# Patient Record
Sex: FEMALE | Race: BLACK OR AFRICAN AMERICAN | NOT HISPANIC OR LATINO
[De-identification: names, ages, dates, MRNs, and addresses within clinical notes are randomized per-mention and may not be internally consistent; named-entity substitution may affect disease eponyms.]

---

## 2017-01-30 ENCOUNTER — APPOINTMENT (OUTPATIENT)
Dept: NEUROLOGY | Facility: CLINIC | Age: 62
End: 2017-01-30

## 2017-01-30 VITALS
DIASTOLIC BLOOD PRESSURE: 89 MMHG | BODY MASS INDEX: 28.82 KG/M2 | HEART RATE: 100 BPM | SYSTOLIC BLOOD PRESSURE: 141 MMHG | HEIGHT: 65 IN | WEIGHT: 173 LBS

## 2017-01-30 DIAGNOSIS — M06.9 RHEUMATOID ARTHRITIS, UNSPECIFIED: ICD-10-CM

## 2017-01-30 DIAGNOSIS — R20.0 ANESTHESIA OF SKIN: ICD-10-CM

## 2017-01-30 DIAGNOSIS — R52 PAIN, UNSPECIFIED: ICD-10-CM

## 2017-01-30 DIAGNOSIS — Z86.79 PERSONAL HISTORY OF OTHER DISEASES OF THE CIRCULATORY SYSTEM: ICD-10-CM

## 2017-01-30 DIAGNOSIS — Z87.898 PERSONAL HISTORY OF OTHER SPECIFIED CONDITIONS: ICD-10-CM

## 2017-01-30 DIAGNOSIS — Z87.19 PERSONAL HISTORY OF OTHER DISEASES OF THE DIGESTIVE SYSTEM: ICD-10-CM

## 2017-01-30 DIAGNOSIS — Z86.69 PERSONAL HISTORY OF OTHER DISEASES OF THE NERVOUS SYSTEM AND SENSE ORGANS: ICD-10-CM

## 2017-02-01 PROBLEM — Z87.898 HISTORY OF EPISTAXIS: Status: RESOLVED | Noted: 2017-02-01 | Resolved: 2017-02-01

## 2017-02-01 PROBLEM — R52 BURNING PAIN: Status: ACTIVE | Noted: 2017-02-01

## 2017-02-01 PROBLEM — Z86.69 HISTORY OF GLAUCOMA: Status: RESOLVED | Noted: 2017-02-01 | Resolved: 2017-02-01

## 2017-02-01 PROBLEM — R20.0 NUMBNESS OF FINGERS OF BOTH HANDS: Status: ACTIVE | Noted: 2017-02-01

## 2017-02-01 PROBLEM — M06.9 RHEUMATOID ARTHRITIS: Status: ACTIVE | Noted: 2017-02-01

## 2017-02-01 PROBLEM — Z86.79 HISTORY OF HYPERTENSION: Status: RESOLVED | Noted: 2017-02-01 | Resolved: 2017-02-01

## 2017-02-01 RX ORDER — PREDNISONE 5 MG/1
5 TABLET ORAL
Refills: 0 | Status: ACTIVE | COMMUNITY

## 2017-02-01 RX ORDER — ADALIMUMAB 40MG/0.8ML
40 KIT SUBCUTANEOUS
Refills: 0 | Status: ACTIVE | COMMUNITY

## 2017-02-01 RX ORDER — METHOTREXATE 2.5 MG/1
TABLET ORAL
Refills: 0 | Status: ACTIVE | COMMUNITY

## 2017-02-14 ENCOUNTER — APPOINTMENT (OUTPATIENT)
Dept: NEUROLOGY | Facility: CLINIC | Age: 62
End: 2017-02-14

## 2017-02-14 DIAGNOSIS — G56.03 CARPAL TUNNEL SYNDROM,BILATERAL UPPER LIMBS: ICD-10-CM

## 2019-04-16 ENCOUNTER — RESULT REVIEW (OUTPATIENT)
Age: 64
End: 2019-04-16

## 2019-06-07 ENCOUNTER — APPOINTMENT (OUTPATIENT)
Dept: NEUROLOGY | Facility: CLINIC | Age: 64
End: 2019-06-07

## 2020-05-28 ENCOUNTER — VIRTUAL VISIT (OUTPATIENT)
Dept: FAMILY MEDICINE CLINIC | Facility: CLINIC | Age: 65
End: 2020-05-28

## 2020-05-28 NOTE — PROGRESS NOTES
Consent: Hailee Gaviria, who was seen by synchronous (real-time) audio-video technology, and/or her healthcare decision maker, is aware that this patient-initiated, Telehealth encounter on 2020 is a billable service, with coverage as determined by her insurance carrier. She is aware that she may receive a bill and has provided verbal consent to proceed: Yes. The patient was at home and I was at the office,and no one else participated in the service. The patient presents with {DIFF DX:81015} The patient presents with {DIFF DX:99352} {vaavitamincdeaids (Optional):77537} There are no diagnoses linked to this encounter. ASSESSMENT and PLAN 
{ASSESSMENT/PLAN:60128} There are no preventive care reminders to display for this patient. {Time-based coding - will disappear if no selections made (Optional):89472::\"I spent at least 15 minutes with this established patient, and >50% of the time was spent counseling and/or coordinating care regarding ***\":0} 
712 Subjective:  
Hailee Gaviria is a 72 y.o. female who was seen for establishment of care {Location on body:90602},{pain nature:669482:p} {Severity:5014:s}{gen duration:593041}{neuro timin}{Has/no assoc sx's:10398}{Dyspnea context:58975}{causes; problem cardiology context:60375}{PED UROLOGY MODIFYING GPMQUBQ:06975093} Associated signs are *** Associated symptoms include ***. No current outpatient medications on file. No current facility-administered medications for this visit. Allergies not on file 
does not have a problem list on file. No past surgical history on file. Relationships Social connections  Talks on phone: Not on file  Gets together: Not on file  Attends Qu[employer] service: Not on file  Active member of club or organization: Not on file  Attends meetings of clubs or organizations: Not on file  Relationship status: Not on file  
 
family history is not on file. ROS Objective: There were no vitals taken for this visit. General: alert, cooperative, no distress Mental  status: normal mood, behavior, speech, dress, motor activity, and thought processes, able to follow commands HENT: NCAT. Pupils are equal.  Eyelids normal.  No obvious oral lesions are noted. *** Neck: no visualized mass Musculoskeletal: Able to move the *** Resp: no respiratory distress. Adequate excursion. Neuro: no gross deficits ***. Skin:  ***. No discoloration or lesions of concern on visible areas. Psychiatric: normal affect, consistent with stated mood, no evidence of hallucinations. No results found for this or any previous visit. No results found for any visits on 05/28/20. We discussed the expected course, resolution and complications of the diagnosis(es) in detail. Medication risks, benefits, costs, interactions, and alternatives were discussed as indicated. I advised her to contact the office if her condition worsens, changes or fails to improve as anticipated. She expressed understanding with the diagnosis(es) and plan. Uche Farnsworth is a 72 y.o. female being evaluated by a video visit encounter for concerns as above. A caregiver was present when appropriate. Due to this being a TeleHealth encounter (During JIPVD-28 public health emergency), evaluation of the following organ systems was limited: Vitals/Constitutional/EENT/Resp/CV/GI//MS/Neuro/Skin/Heme-Lymph-Imm. Pursuant to the emergency declaration under the [facility], 1135 waiver authority and the [employer] Act, this Virtual  Visit was conducted, with patient's (and/or legal guardian's) consent, to reduce the patient's risk of exposure to COVID-19 and provide necessary medical care. Maria C Dubon MD 
 
This note was done with the assistance of dragon speech software.   Some inadvertent errors or omissions may be presentThis encounter was created in error - please disregard.

## 2020-05-28 NOTE — PROGRESS NOTES
Melissa Sesay is a 72 y.o. female evaluated via audio only technology on 6/1/2020. Consent: She and/or her health care decision maker is aware that she may receive a bill for this audio only encounter, depending on her insurance coverage, and has provided verbal consent to proceed: Yes    I communicated with the patient and/or health care decision maker about the nature and details of the following:  Assessment & Plan:   Diagnoses and all orders for this visit:    5. Essential hypertension    6. Rheumatoid arthritis involving multiple sites, unspecified rheumatoid factor presence ([facility] [address]  -     REFERRAL TO RHEUMATOLOGY      No other encounters are seen here. The complexity of medical decision making for this visit is moderate         12  Subjective:   Melissa Sesay is a 72 y.o. female who was seen for  [facility] care. She recently moved here from [place] not working. She was switched to the following visit. She has RA as she states it primarily affects her wrists. Her fingers are also affected. She has carried this diagnosis since 2011  She was formerly on Humira. She was also taking methotrexate and folic acid. The last time that she took these medications was February 2020   She has a Rheumatologist now in our area and will most likely start back up on the medication. Has hada bone density test done here  She has pan x rays of her both hands, also. She has had carpal tunnel surgery bilaterally. Hypertension  Amlodipine 5  Lisinopril/hct  20/12.5  The patient has had no problem with the medication. The patient has no headaches, visual changes, chest pain or pressure,dyspnea, orthopnea, abdominal pain, dysuria, weakness, or paresthesias. BP Readings from Last 3 Encounters:   No data found for BP       Glaucoma  The patient states that this is a chronic problem no eye pain is admitted to. She will find a new ophthalmologist in our area.   Her medications are the following;  Dorzolamide-timolol 22.3-6.8 mg  Cosopt}Igg ou bid  Latanoprost.. 005%Igtt hs  Eye pressures 19/20  Tobacco use  Stopped smoking in 2003. Started age 25. Less than a pack a day when she did smoke. Alcohol use  The patient does drink alcohol, primarily wine. Sometimes one and occasionally 2 drinks a day  She may drink 5/7 days of the week. Right hip pain    Hypercholesterolemia  Need copy of lipid test from [place] when she will get for us. The patient denies muscle aches, headache, GI symptoms or difficulty with mentation release of information form will be signed. HM due  Due for colonoscopy  Last colonoscopy was at age 46. Mammogram   Last May 2019. Prior to Admission medications    Not on File     Not on File    There are no active problems to display for this patient. Current Outpatient Medications   Medication Sig Dispense Refill    varicella-zoster recombinant, PF, (Shingrix, PF,) 50 mcg/0.5 mL susr injection 0.5mL by IntraMUSCular route once now and then repeat in 2-6 months 0.5 mL 1     Not on File  No past medical history on file. Review of Systems   Constitutional: Negative for chills, fever and malaise/fatigue. HENT: Negative for congestion and sore throat. Eyes: Negative for blurred vision and redness. Respiratory: Negative for cough, shortness of breath and wheezing. Cardiovascular: Negative for chest pain and leg swelling. Gastrointestinal: Negative for abdominal pain, blood in stool, constipation, diarrhea and heartburn. Genitourinary: Negative for dysuria and urgency. Musculoskeletal: Positive for joint pain (She has pain in her wrists fingers and right hip) and myalgias. Neurological: Negative for dizziness, sensory change, speech change and focal weakness. Endo/Heme/Allergies: Does not bruise/bleed easily. Psychiatric/Behavioral: The patient is not nervous/anxious.         I affirm this is a Patient-Initiated Episode with a Patient who has not had a related appointment within my department in the past 7 days or scheduled within the next 24 hours.     Total Time: minutes: 11-20 minutes    Note: not billable if this call serves to triage the patient into an appointment for the relevant concern      Maria C Dubon MD

## 2020-06-01 ENCOUNTER — DOCUMENTATION ONLY (OUTPATIENT)
Dept: FAMILY MEDICINE CLINIC | Facility: CLINIC | Age: 65
End: 2020-06-01

## 2020-06-01 ENCOUNTER — VIRTUAL VISIT (OUTPATIENT)
Dept: FAMILY MEDICINE CLINIC | Facility: CLINIC | Age: 65
End: 2020-06-01

## 2020-06-01 DIAGNOSIS — Z12.31 ENCOUNTER FOR SCREENING MAMMOGRAM FOR MALIGNANT NEOPLASM OF BREAST: ICD-10-CM

## 2020-06-01 DIAGNOSIS — Z12.11 SCREEN FOR COLON CANCER: ICD-10-CM

## 2020-06-01 DIAGNOSIS — Z00.00 MEDICARE ANNUAL WELLNESS VISIT, INITIAL: Primary | ICD-10-CM

## 2020-06-01 DIAGNOSIS — I10 ESSENTIAL HYPERTENSION: ICD-10-CM

## 2020-06-01 DIAGNOSIS — M06.9 RHEUMATOID ARTHRITIS INVOLVING MULTIPLE SITES, UNSPECIFIED RHEUMATOID FACTOR PRESENCE: ICD-10-CM

## 2020-06-01 DIAGNOSIS — Z13.39 SCREENING FOR ALCOHOLISM: ICD-10-CM

## 2020-06-01 RX ORDER — ZOSTER VACCINE RECOMBINANT, ADJUVANTED 50 MCG/0.5
KIT INTRAMUSCULAR
Qty: 0.5 ML | Refills: 1 | Status: SHIPPED | OUTPATIENT
Start: 2020-06-01 | End: 2020-10-13 | Stop reason: SDUPTHER

## 2020-06-01 NOTE — PROGRESS NOTES
This is an Initial Medicare Annual Wellness Exam (AWV) (Performed 12 months after IPPE or effective date of Medicare Part B enrollment, Once in a lifetime). Consent: Aureliano Odell, who was seen by synchronous (real-time) audio only technology, and/or her healthcare decision maker, is aware that this patient-initiated, Telehealth encounter on 6/1/2020 is a billable service. While AWVs are fully covered by Medicare, any services rendered on this date that are not included in an AWV are subject to additional billing, with coverage as determined by her insurance carrier. She is aware that she may receive a bill for any such additional services and has provided verbal consent to proceed: Yes. I have reviewed the patient's medical history in detail and updated the computerized patient record. History   There is no problem list on file for this patient. No past medical history on file. No past surgical history on file. Not on File    No family history on file. Social History     Tobacco Use    Smoking status: Not on file   Substance Use Topics    Alcohol use: Not on file       Depression Risk Factor Screening:     3 most recent PHQ Screens 6/1/2020   Little interest or pleasure in doing things Not at all   Feeling down, depressed, irritable, or hopeless Not at all   Total Score PHQ 2 0       Alcohol Risk Factor Screening:   Do you average 1 drink per night or more than 7 drinks a week:  No  The patient drinks wine on occasion. Up to 5 drinks a week. She has never had more than 2 drinks at one time containing alcohol. On any one occasion in the past three months have you have had more than 3 drinks containing alcohol:  No      Functional Ability and Level of Safety:   Hearing: Hearing is good. Activities of Daily Living: The home contains: no safety equipment.   Patient does total self care     Ambulation: with no difficulty    Fall Risk:  Fall Risk Assessment, last 12 mths 6/1/2020   Able to walk? Yes   Fall in past 12 months? No       Abuse Screen:  Patient is not abused  She stays with and takes care of her mother. The family recently moved here from [place]. She is a retired . Cognitive Screening   Has your family/caregiver stated any concerns about your memory: no  Cognitive Screening: Normal - Verbal Fluency Test    Patient Care Team   Patient Care Team:  Keely Hatfield MD as PCP - General (Internal Medicine)    Assessment/Plan   Education and counseling provided:  Are appropriate based on today's review and evaluation  Pneumococcal Vaccine    Diagnoses and all orders for this visit:    1. Medicare annual wellness visit, initial  -     HEPATITIS C AB; Future  -     varicella-zoster recombinant, PF, (Shingrix, PF,) 50 mcg/0.5 mL susr injection; 0.5mL by IntraMUSCular route once now and then repeat in 2-6 months  -     pneumococcal 23-yomaira ps vaccine (PNEUMOVAX 23) 25 mcg/0.5 mL injection; 0.5 mL by IntraMUSCular route once for 1 dose. 2. Screening for alcoholism  -     [facility] ANNUAL ALCOHOL SCREEN 15 MIN    3. Encounter for screening mammogram for malignant neoplasm of breast  -     [facility] MAMMO BI SCREENING INCL CAD; Future    4. Screen for colon cancer  -     REFERRAL FOR COLONOSCOPY         Health Maintenance Due   Topic Date Due    Hepatitis C Screening  1955    DTaP/Tdap/Td series (1 - Tdap) 01/25/1976    Lipid Screen  01/25/1995    Shingrix Vaccine Age 50> (1 of 2) 01/25/2005    Breast Cancer Screen Mammogram  01/25/2005    FOBT Q1Y Age 50-75  01/25/2005    GLAUCOMA SCREENING Q2Y  01/25/2020    Bone Densitometry (Dexa) Screening  01/25/2020    Pneumococcal 65+ years (1 of 1 - PPSV23) 01/25/2020    Medicare Yearly Exam  05/28/2020   The patient was given a prescription for the Shingrix vaccine and pneumococcal vaccine.   She is going to take that to her new rheumatologist to decide if and when she can take the Shingrix vaccine as she may be restarting methotrexate and Ricardo. Yamilet Ackerman is a 72 y.o. female who was evaluated by an audio only encounter for concerns as above. Patient identification was verified prior to start of the visit. A caregiver was present when appropriate. Due to this being a TeleHealth encounter ([facility] public health emergency), evaluation of the following organ systems was limited: Vitals/Constitutional/EENT/Resp/CV/GI//MS/Neuro/Skin/Heme-Lymph-Imm. Pursuant to the emergency declaration under the [address] [place] [address], [facility] waiver authority and the [employer] Act, this Virtual Visit was conducted, with patient's (and/or legal guardian's) consent, to reduce the patient's risk of exposure to COVID-19 and provide necessary medical care. Services were provided through a synchronous discussion virtually to substitute for in-person clinic visit. I was in the office. The patient was at home.         Kennieth Boeck, MD

## 2020-06-01 NOTE — PATIENT INSTRUCTIONS
Medicare Wellness Visit, Female The best way to live healthy is to have a lifestyle where you eat a well-balanced diet, exercise regularly, limit alcohol use, and quit all forms of tobacco/nicotine, if applicable. Regular preventive services are another way to keep healthy. Preventive services (vaccines, screening tests, monitoring & exams) can help personalize your care plan, which helps you manage your own care. Screening tests can find health problems at the earliest stages, when they are easiest to treat. [facility] follows the current, evidence-based guidelines published by the [facility] Arnaldo Pike ([facility]) when recommending preventive services for our patients. Because we follow these guidelines, sometimes recommendations change over time as research supports it. (For example, mammograms used to be recommended annually. Even though Medicare will still pay for an annual mammogram, the newer guidelines recommend a mammogram every two years for women of average risk). Of course, you and your doctor may decide to screen more often for some diseases, based on your risk and your co-morbidities (chronic disease you are already diagnosed with). Preventive services for you include: - Medicare offers their members a free annual wellness visit, which is time for you and your primary care provider to discuss and plan for your preventive service needs. Take advantage of this benefit every year! 
-All adults over the age of 72 should receive the recommended pneumonia vaccines. Current USPSTF guidelines recommend a series of two vaccines for the best pneumonia protection.  
-All adults should have a flu vaccine yearly and a tetanus vaccine every 10 years.  
-All adults age 48 and older should receive the shingles vaccines (series of two vaccines). -All adults age 38-68 who are overweight should have a diabetes screening test once every three years. -All adults born between [birth year] and 1965 should be screened once for Hepatitis C. 
-Other screening tests and preventive services for persons with diabetes include: an eye exam to screen for diabetic retinopathy, a kidney function test, a foot exam, and stricter control over your cholesterol.  
-Cardiovascular screening for adults with routine risk involves an electrocardiogram (ECG) at intervals determined by your doctor.  
-Colorectal cancer screenings should be done for adults age 54-65 with no increased risk factors for colorectal cancer. There are a number of acceptable methods of screening for this type of cancer. Each test has its own benefits and drawbacks. Discuss with your doctor what is most appropriate for you during your annual wellness visit. The different tests include: colonoscopy (considered the best screening method), a fecal occult blood test, a fecal DNA test, and sigmoidoscopy. 
 
-A bone mass density test is recommended when a woman turns 65 to screen for osteoporosis. This test is only recommended one time, as a screening. Some providers will use this same test as a disease monitoring tool if you already have osteoporosis. -Breast cancer screenings are recommended every other year for women of normal risk, age 54-69. 
-Cervical cancer screenings for women over age 72 are only recommended with certain risk factors. Here is a list of your current Health Maintenance items (your personalized list of preventive services) with a due date: 
Health Maintenance Due Topic Date Due  
 Hepatitis C Test  1955  DTaP/Tdap/Td  (1 - Tdap) 01/25/1976  Cholesterol Test   01/25/1995  Shingles Vaccine (1 of 2) 01/25/2005  Mammogram  01/25/2005  Colon Cancer Stool Test  01/25/2005  Glaucoma Screening   01/25/2020  Bone Mineral Density   01/25/2020  Pneumococcal Vaccine (1 of 1 - PPSV23) 01/25/2020 [address] [place] [address] Annual Well Visit  05/28/2020 Learning About Breast Cancer Screening What is breast cancer screening? Breast cancer occurs when cells that are not normal grow in one or both of your breasts. Screening tests can help find breast cancer early. Cancer is easier to treat when it's found early. Having concerns about breast cancer is common. That's why it's important to talk with your doctor about when to start and how often to get screened for breast cancer. How is breast cancer screening done? Several screening tests can be used to check for breast cancer. · Mammograms check for signs of cancer using X-rays. They can show tumors that are too small for you or your doctor to feel. During a mammogram, a machine squeezes your breasts to make them flatter and easier to X-ray. At least two pictures are taken of each breast. One is taken from the top and one from the side. · 3-D mammograms are also called digital breast tomosynthesis. Your breast is positioned on a flat plate. A top plate is pressed against your breast to keep it in position. The X-ray arm then moves in an arc above the breast and takes many pictures. A computer uses these X-rays to create a three-dimensional image. · Clinical breast exams are a doctor's exam. Your doctor carefully feels your breasts and under your arms to check for lumps or other changes. After the screening, your doctor will tell you the results. You will also be told if you need any follow-up tests. When should you get screened? Talk with your doctor about when you should start being tested for breast cancer. How often you get tested and the kind of tests you get will depend on your age and your risk. The guidelines that follow are for women who have an average risk for breast cancer. If you have a higher risk for breast cancer, such as having a family history of breast cancer in multiple relatives or at a young age, your doctor may recommend different screening for you. · Ages 21 to 44: Some experts recommend that women have a clinical breast exam every 3 years, starting at age 21. Ask your doctor how often you should have this test. If you have a high risk for breast cancer, talk with your doctor about when to start yearly mammograms and other screening tests. · Ages 36 and older: Talk with your doctor about how often you should have mammograms and clinical breast exams. What is your risk for breast cancer? If you don't already know your risk of breast cancer, you can ask your doctor about it. You can also look it up at www.cancer.gov/bcrisktool/. If your doctor says that you have a high or very high risk, ask about ways to reduce your risk. These could include getting extra screening, taking medicine, or having surgery. If you have a strong family history of breast cancer, ask your doctor about genetic testing. What steps can you take to stay healthy? Some things that increase your risk of breast cancer, such as your age and being female, cannot be controlled. But you can do some things to stay as healthy as you can. · Learn what your breasts normally look and feel like. If you notice any changes, tell your doctor. · Drink alcohol wisely. Your risk goes up the more you drink. For the best health, women should have no more than 1 drink a day or 7 drinks a week. · If you smoke, quit. When you quit smoking, you lower your chances of getting many types of cancer. You can also do your best to eat well, be active, and stay at a healthy weight. Eating healthy foods and being active every day, as well as staying at a healthy weight, may help prevent cancer. Where can you learn more? Go to http://chinmya-sara.info/. Enter N328 in the search box to learn more about \"Learning About Breast Cancer Screening. \" Current as of: March 28, 2018 Content Version: 11.8 © 2793-8474 Healthwise, Incorporated.  Care instructions adapted under license by [address] (which disclaims liability or warranty for this information). If you have questions about a medical condition or this instruction, always ask your healthcare professional. [address] any warranty or liability for your use of this information. Learning About Cervical Cancer Screening What is a cervical cancer screening test? 
  
The cervix is the lower part of the uterus that opens into the vagina. Cervical cancer screening tests check the cells on the cervix for changes that could lead to cancer. Two tests can be used to screen for cervical cancer. They may be used alone or together. A Pap test.  
This test looks for changes in the cells of the cervix. Some of these cell changes could lead to cancer. A human papillomavirus (HPV) test.  
This test looks for the HPV virus. Some high-risk types of HPV can cause cell changes that could lead to cervical cancer. The HPV test may be used with the Pap test in women ages 27 and older. When should you have a screening test? 
If you have a cervix and are at average risk for cervical cancer, your doctor will likely suggest starting screening at age 24. Ages 24 to 34 If you're in this age group, your doctor will likely suggest that you get a Pap test. If your Pap test results are normal, you can wait 3 years to have another test. 
Ages 27 to 59 Screening options for this age group may include: · A Pap test. If your results are normal, you can wait 3 years to have another test. 
· An HPV test. If your results are normal, you can wait 5 years to have another test. 
· A Pap test and an HPV test. If your results are normal, you can wait 5 years to be tested again. Ages 72 and older If you are age 72 or older, you may no longer need this screening test. Talk to your doctor. What do the results of cervical cancer screening mean?  
Your test results may be normal. Or the results may show minor or serious changes to the cells on your cervix. Minor changes may go away on their own, especially if you are younger than 30. You may have an abnormal test because you have an infection of the vagina or cervix or because you have low estrogen levels after menopause that are causing the cells to change. If you have a high-risk type of human papillomavirus (HPV) or cell changes that could turn into cancer, you may need more tests. The next step may be a colposcopy or treatment to remove or destroy the abnormal cells. If you have a Pap test, an HPV test, or both, your doctor will recommend a follow-up plan based on your results and your age. Follow-up care is a key part of your treatment and safety. Be sure to make and go to all appointments, and call your doctor if you are having problems. It's also a good idea to know your test results and keep a list of the medicines you take. Where can you learn more? Go to http://chinmay-sara.info/ Enter P919 in the search box to learn more about \"Learning About Cervical Cancer Screening. \" Current as of: August 22, 2019               Content Version: 12.5 © 3859-2487 [employer]. Care instructions adapted under license by [employer] (which disclaims liability or warranty for this information). If you have questions about a medical condition or this instruction, always ask your healthcare professional. [address] any warranty or liability for your use of this information. Colon Cancer Screening: Care Instructions Your Care Instructions Colorectal cancer occurs in the colon or rectum. That's the lower part of your digestive system. It is the second-leading cause of cancer deaths in the [place]. It often starts with small growths called polyps in the colon or rectum. Polyps are usually found with screening tests. Depending on the type of test, any polyps found may be removed during the tests. Colorectal cancer usually does not cause symptoms at first. But regular tests can help find it early, before it spreads and becomes harder to treat. Experts advise routine tests for colon cancer for people starting at age 48. And they advise people with a higher risk of colon cancer to get tested sooner. Talk with your doctor about when you should start testing. Discuss which tests you need. Follow-up care is a key part of your treatment and safety. Be sure to make and go to all appointments, and call your doctor if you are having problems. It's also a good idea to know your test results and keep a list of the medicines you take. What are the main screening tests for colon cancer? · Stool tests. These include the fecal immunochemical test (FIT) and the fecal occult blood test (FOBT). These tests check stool samples for signs of cancer. If your test is positive, you will need to have a colonoscopy. · Sigmoidoscopy. This test lets your doctor look at the lining of your rectum and the lowest part of your colon. Your doctor uses a lighted tube called a sigmoidoscope. This test can't find cancers or polyps in the upper part of your colon. In some cases, polyps that are found can be removed. But if your doctor finds polyps, you will need to have a colonoscopy to check the upper part of your colon. · Colonoscopy. This test lets your doctor look at the lining of your rectum and your entire colon. The doctor uses a thin, flexible tool called a colonoscope. It can also be used to remove polyps or get a tissue sample (biopsy). What tests do you need? The following guidelines are for people age 48 and over who are not at high risk for colorectal cancer. You may have at least one of these tests as directed by your doctor. · Fecal immunochemical test (FIT) or fecal occult blood test (FOBT) every year · Sigmoidoscopy every 5 years · Colonoscopy every 10 years If you are age 68 to 80, you can work with your doctor to decide if screening is a good option. If you are age 80 or older, your doctor will likely advise that screening is not helpful. Talk with your doctor about when you need to be tested. And discuss which tests are right for you. Your doctor may recommend earlier or more frequent testing if you: 
· Have had colorectal cancer before. · Have had colon polyps. · Have symptoms of colorectal cancer. These include blood in your stool and changes in your bowel habits. · Have a parent, brother or sister, or child with colon polyps or colorectal cancer. · Have a bowel disease. This includes ulcerative colitis and Crohn's disease. · Have a rare polyp syndrome that runs in families, such as familial adenomatous polyposis (FAP). · Have had radiation treatments to the belly or pelvis. When should you call for help? Watch closely for changes in your health, and be sure to contact your doctor if: 
  · You have any changes in your bowel habits.  
  · You have any problems. Where can you learn more? Go to http://chinmay-sara.info/. Enter M541 in the search box to learn more about \"Colon Cancer Screening: Care Instructions. \" Current as of: March 28, 2018 Content Version: 11.8 © 0732-9786 [employer]. Care instructions adapted under license by [employer] (which disclaims liability or warranty for this information). If you have questions about a medical condition or this instruction, always ask your healthcare professional. [address] any warranty or liability for your use of this information. Osteoporosis: Care Instructions Your Care Instructions Osteoporosis causes bones to become thin and weak. It is much more common in women than in men. Osteoporosis may be very advanced before you know you have it.  Sometimes the first sign is a broken bone in the hip, spine, or wrist or sudden pain in your middle or lower back. Follow-up care is a key part of your treatment and safety. Be sure to make and go to all appointments, and call your doctor if you are having problems. It's also a good idea to know your test results and keep a list of the medicines you take. How can you care for yourself at home? · Your doctor may prescribe a bisphosphonate, such as risedronate (Actonel) or alendronate (Fosamax), for osteoporosis. If you are taking one of these medicines by mouth: 
? Take your medicine with a full glass of water when you first get up in the morning. ? Do not lie down, eat, drink a beverage, or take any other medicine for at least 30 minutes after taking the drug. This helps prevent stomach problems. ? Do not take your medicine late in the day if you forgot to take it in the morning. Skip it, and take the usual dose the next morning. ? If you have side effects, tell your doctor. He or she may prescribe another medicine. · Get enough calcium and vitamin D. The [place] of Medicine recommends adults younger than age 46 need 1,000 mg of calcium and 600 IU of vitamin D each day. Women ages 46 to 79 need 1,200 mg of calcium and 600 IU of vitamin D each day. Men ages 46 to 79 need 1,000 mg of calcium and 600 IU of vitamin D each day. Adults 71 and older need 1,200 mg of calcium and 800 IU of vitamin D each day. It's not clear if people who already have osteoporosis need more calcium and vitamin D than this. Talk to your doctor about what's right for you. 
? Eat foods rich in calcium, like yogurt, cheese, milk, and dark green vegetables. This is a good way to get the calcium you need. You can get vitamin D from eggs, fatty fish, cereal, and milk. ? Ask your doctor if you need to take a calcium plus vitamin D supplement. You may be able to get enough calcium and vitamin D through your diet. Be careful with supplements.  Adults ages 23 to 48 should not get more than 2,500 mg of calcium and 4,000 IU of vitamin D each day, whether it is from supplements and/or food. Adults ages 46 and older should not get more than 2,000 mg of calcium and 4,000 IU of vitamin D each day from supplements and/or food. · Limit alcohol to 2 drinks a day for men and 1 drink a day for women. Too much alcohol can cause health problems. · Do not smoke. Smoking puts you at a much higher risk for osteoporosis. If you need help quitting, talk to your doctor about stop-smoking programs and medicines. These can increase your chances of quitting for good. · Get regular bone-building exercise. Weight-bearing and resistance exercises keep bones healthy by working the muscles and bones against gravity. Start out at an exercise level that feels right for you. Add a little at a time until you can do the following: ? Do 30 minutes of weight-bearing exercise on most days of the week. Walking, jogging, stair climbing, and dancing are good choices. ? Do resistance exercises with weights or elastic bands 2 to 3 days a week. · Reduce your risk of falls: 
? Wear supportive shoes with low heels and nonslip soles. ? Use a cane or walker, if you need it. Use shower chairs and bath benches. Put in handrails on stairways, around your shower or tub area, and near the toilet. ? Keep stairs, porches, and walkways well lit. Use night-lights. ? Remove throw rugs and other objects that are in the way. ? Avoid icy, wet, or slippery surfaces. ? Keep a cordless phone and a flashlight with new batteries by your bed. When should you call for help? Watch closely for changes in your health, and be sure to contact your doctor if you have any problems. Where can you learn more? Go to http://chinmay-sara.info/ Enter K100 in the search box to learn more about \"Osteoporosis: Care Instructions. \" Current as of: August 7, 2019               Content Version: 12.5 © 5801-4898 Healthwise, Incorporated. Care instructions adapted under license by [employer] (which disclaims liability or warranty for this information). If you have questions about a medical condition or this instruction, always ask your healthcare professional. [address] any warranty or liability for your use of this information. Advance Care Planning: Care Instructions Your Care Instructions It can be hard to live with an illness that cannot be cured. But if your health is getting worse, you may want to make decisions about end-of-life care. Planning for the end of your life does not mean that you are giving up. It is a way to make sure that your wishes are met. Clearly stating your wishes can make it easier for your loved ones. Making plans while you are still able may also ease your mind and make your final days less stressful and more meaningful. Follow-up care is a key part of your treatment and safety. Be sure to make and go to all appointments, and call your doctor if you are having problems. It's also a good idea to know your test results and keep a list of the medicines you take. What can you do to plan for the end of life? · You can bring these issues up with your doctor. You do not need to wait until your doctor starts the conversation. You might start with \"I would not be willing to live with . Pamalee Bucker Pamtaoe Hemaer Luciano Odom \" When you complete this sentence it helps your doctor understand your wishes. · Talk openly and honestly with your doctor. This is the best way to understand the decisions you will need to make as your health changes. Know that you can always change your mind. · Ask your doctor about commonly used life-support measures. These include tube feedings, breathing machines, and fluids given through a vein (IV). Understanding these treatments will help you decide whether you want them.  
· You may choose to have these life-supporting treatments for a limited time. This allows a trial period to see whether they will help you. You may also decide that you want your doctor to take only certain measures to keep you alive. It is important to spell out these conditions so that your doctor and family understand them. · Talk to your doctor about how long you are likely to live. He or she may be able to give you an idea of what usually happens with your specific illness. · Think about preparing papers that state your wishes. This way there will not be any confusion about what you want. You can change your instructions at any time. Which papers should you prepare? Advance directives are legal papers that tell doctors how you want to be cared for at the end of your life. You do not need a  to write these papers. Ask your doctor or your state [facility] department for information on how to write your advance directives. They may have the forms for each of these types of papers. Make sure your doctor has a copy of these on file, and give a copy to a family member or close friend. · Consider a do-not-resuscitate order (DNR). This order asks that no extra treatments be done if your heart stops or you stop breathing. Extra treatments may include cardiopulmonary resuscitation (CPR), electrical shock to restart your heart, or a machine to breathe for you. If you decide to have a DNR order, ask your doctor to explain and write it. Place the order in your home where everyone can easily see it. · Consider a living will. A living will explains your wishes about life support and other treatments at the end of your life if you become unable to speak for yourself. Living gonzales tell doctors to use or not use treatments that would keep you alive. You must have one or two witnesses or a notary present when you sign this form. · Consider a durable power of  for health care. This allows you to name a person to make decisions about your care if you are not able to. Most people ask a close friend or family member. Talk to this person about the kinds of treatments you want and those that you do not want. Make sure this person understands your wishes. These legal papers are simple to change. Tell your doctor what you want to change, and ask him or her to make a note in your medical file. Give your family updated copies of the papers. Where can you learn more? Go to http://chinmay-sara.info/. Enter P184 in the search box to learn more about \"Advance Care Planning: Care Instructions. \" Current as of: November 17, 2016 Content Version: 11.3 © 3132-8377 [employer]. Care instructions adapted under license by [employer] (which disclaims liability or warranty for this information). If you have questions about a medical condition or this instruction, always ask your healthcare professional. [address] any warranty or liability for your use of this information. [address] What is a living will? A living will is a legal form you use to write down the kind of care you want at the end of your life. It is used by the health professionals who will treat you if you aren't able to decide for yourself. If you put your wishes in writing, your loved ones and others will know what kind of care you want. They won't need to guess. This can ease your mind and be helpful to others. A living will is not the same as an estate or property will. An estate will explains what you want to happen with your money and property after you die. Is a living will a legal document? A living will is a legal document. Each state has its own laws about living gonzales. If you move to another state, make sure that your living will is legal in the state where you now live. Or you might use a universal form that has been approved by many states.  This kind of form can sometimes be completed and stored online. Your electronic copy will then be available wherever you have a connection to the Internet. In most cases, doctors will respect your wishes even if you have a form from a different state. · You don't need an  to complete a living will. But legal advice can be helpful if your state's laws are unclear, your health history is complicated, or your family can't agree on what should be in your living will. · You can change your living will at any time. Some people find that their wishes about end-of-life care change as their health changes. · In addition to making a living will, think about completing a medical power of  form. This form lets you name the person you want to make end-of-life treatment decisions for you (your \"health care agent\") if you're not able to. Many hospitals and nursing homes will give you the forms you need to complete a living will and a medical power of . · Your living will is used only if you can't make or communicate decisions for yourself anymore. If you become able to make decisions again, you can accept or refuse any treatment, no matter what you wrote in your living will. · Your state may offer an online registry. This is a place where you can store your living will online so the doctors and nurses who need to treat you can find it right away. What should you think about when creating a living will? Talk about your end-of-life wishes with your family members and your doctor. Let them know what you want. That way the people making decisions for you won't be surprised by your choices. Think about these questions as you make your living will: · Do you know enough about life support methods that might be used? If not, talk to your doctor so you know what might be done if you can't breathe on your own, your heart stops, or you're unable to swallow.  
· What things would you still want to be able to do after you receive life-support methods? Would you want to be able to walk? To speak? To eat on your own? To live without the help of machines? · If you have a choice, where do you want to be cared for? In your home? At a hospital or nursing home? · Do you want certain Voodoo practices performed if you become very ill? · If you have a choice at the end of your life, where would you prefer to die? At home? In a hospital or nursing home? Somewhere else? · Would you prefer to be buried or cremated? · Do you want your organs to be donated after you die? What should you do with your living will? · Make sure that your family members and your health care agent have copies of your living will. · Give your doctor a copy of your living will to keep in your medical record. If you have more than one doctor, make sure that each one has a copy. · You may want to put a copy of your living will where it can be easily found. Where can you learn more? Go to http://chinmay-sara.info/. Enter F978 in the search box to learn more about \"Learning About Living Perroy. \" Current as of: August 8, 2016 Content Version: 11.3 © 8756-5300 "[employer] Care instructions adapted under license by [employer] (which disclaims liability or warranty for this information). If you have questions about a medical condition or this instruction, always ask your healthcare professional. [place] [address] any warranty or liability for your use of this information. Preventing Falls: Care Instructions Your Care Instructions Getting around your home safely can be a challenge if you have injuries or health problems that make it easy for you to fall. Loose rugs and furniture in walkways are among the dangers for many older people who have problems walking or who have poor eyesight. People who have conditions such as arthritis, osteoporosis, or dementia also have to be careful not to fall. You can make your home safer with a few simple measures. Follow-up care is a key part of your treatment and safety. Be sure to make and go to all appointments, and call your doctor if you are having problems. It's also a good idea to know your test results and keep a list of the medicines you take. How can you care for yourself at home? Taking care of yourself · You may get dizzy if you do not drink enough water. To prevent dehydration, drink plenty of fluids, enough so that your urine is light yellow or clear like water. Choose water and other caffeine-free clear liquids. If you have kidney, heart, or liver disease and have to limit fluids, talk with your doctor before you increase the amount of fluids you drink. · Exercise regularly to improve your strength, muscle tone, and balance. Walk if you can. Swimming may be a good choice if you cannot walk easily. · Have your vision and hearing checked each year or any time you notice a change. If you have trouble seeing and hearing, you might not be able to avoid objects and could lose your balance. · Know the side effects of the medicines you take. Ask your doctor or pharmacist whether the medicines you take can affect your balance. Sleeping pills or sedatives can affect your balance. · Limit the amount of alcohol you drink. Alcohol can impair your balance and other senses. · Ask your doctor whether calluses or corns on your feet need to be removed. If you wear loose-fitting shoes because of calluses or corns, you can lose your balance and fall. · Talk to your doctor if you have numbness in your feet. Preventing falls at home · Remove raised doorway thresholds, throw rugs, and clutter. Repair loose carpet or raised areas in the floor. · Move furniture and electrical cords to keep them out of walking paths. · Use nonskid floor wax, and wipe up spills right away, especially on ceramic tile floors. · If you use a walker or cane, put rubber tips on it. If you use crutches, clean the bottoms of them regularly with an abrasive pad, such as steel wool. · Keep your house well lit, especially [address], and outside walkways. Use night-lights in areas such as hallways and bathrooms. Add extra light switches or use remote switches (such as switches that go on or off when you clap your hands) to make it easier to turn lights on if you have to get up during the night. · Install sturdy handrails on stairways. · Move items in your cabinets so that the things you use a lot are on the lower shelves (about waist level). · Keep a cordless phone and a flashlight with new batteries by your bed. If possible, put a phone in each of the main rooms of your house, or carry a cell phone in case you fall and cannot reach a phone. Or, you can wear a device around your neck or wrist. You push a button that sends a signal for help. · Wear low-heeled shoes that fit well and give your feet good support. Use footwear with nonskid soles. Check the heels and soles of your shoes for wear. Repair or replace worn heels or soles. · Do not wear socks without shoes on wood floors. · Walk on the grass when the sidewalks are slippery. If you live in an area that gets snow and ice in the winter, sprinkle salt on slippery steps and sidewalks. Preventing falls in the bath · Install grab bars and nonskid mats inside and outside your shower or tub and near the toilet and sinks. · Use shower chairs and bath benches. · Use a hand-held shower head that will allow you to sit while showering. · Get into a tub or shower by putting the weaker leg in first. Get out of a tub or shower with your strong side first. 
· Repair loose toilet seats and consider installing a raised toilet seat to make getting on and off the toilet easier. · Keep your bathroom door unlocked while you are in the shower. Where can you learn more? Go to http://chinmay-sara.info/. Enter [id number] [id number] 69 [id number] in the search box to learn more about \"Preventing Falls: Care Instructions. \" Current as of: March 16, 2018 Content Version: 11.8 © 0607-9536 [employer]. Care instructions adapted under license by [employer] (which disclaims liability or warranty for this information). If you have questions about a medical condition or this instruction, always ask your healthcare professional. [place] [address] any warranty or liability for your use of this information.

## 2020-06-03 NOTE — PROGRESS NOTES
Upon review the patient just received Medicare at the beginning of 2020. Unable to change to a  has to be done in the office so the [facility] can be done. Patient has until the end of this year to get IPPE done. Removed [id number] from charge.

## 2020-08-20 PROBLEM — Z87.19 HISTORY OF CALCULUS OF GALLBLADDER: Status: RESOLVED | Noted: 2017-02-01 | Resolved: 2020-08-20

## 2020-09-03 ENCOUNTER — OFFICE VISIT (OUTPATIENT)
Dept: ORTHOPEDIC SURGERY | Age: 65
End: 2020-09-03

## 2020-09-03 VITALS
RESPIRATION RATE: 16 BRPM | TEMPERATURE: 96.4 F | HEIGHT: 65 IN | SYSTOLIC BLOOD PRESSURE: 128 MMHG | OXYGEN SATURATION: 100 % | HEART RATE: 80 BPM | WEIGHT: 162 LBS | BODY MASS INDEX: 26.99 KG/M2 | DIASTOLIC BLOOD PRESSURE: 79 MMHG

## 2020-09-03 DIAGNOSIS — M67.431 GANGLION CYST OF DORSUM OF RIGHT WRIST: Primary | ICD-10-CM

## 2020-09-03 DIAGNOSIS — M06.321: ICD-10-CM

## 2020-09-03 DIAGNOSIS — M06.9 RHEUMATOID ARTHRITIS INVOLVING BOTH HANDS, UNSPECIFIED RHEUMATOID FACTOR PRESENCE: ICD-10-CM

## 2020-09-03 RX ORDER — AMLODIPINE BESYLATE 5 MG/1
5 TABLET ORAL DAILY
COMMUNITY
End: 2021-04-09 | Stop reason: SDUPTHER

## 2020-09-03 RX ORDER — ALENDRONATE SODIUM 70 MG/1
TABLET ORAL
COMMUNITY
End: 2022-10-11

## 2020-09-03 RX ORDER — DORZOLAMIDE HYDROCHLORIDE AND TIMOLOL MALEATE PRESERVATIVE FREE 20; 5 MG/ML; MG/ML
1 SOLUTION/ DROPS OPHTHALMIC 2 TIMES DAILY
COMMUNITY
End: 2021-10-07 | Stop reason: SDUPTHER

## 2020-09-03 RX ORDER — LATANOPROST 50 UG/ML
1 SOLUTION/ DROPS OPHTHALMIC
COMMUNITY

## 2020-09-03 RX ORDER — GLUCOSAMINE SULFATE 1500 MG
POWDER IN PACKET (EA) ORAL DAILY
COMMUNITY

## 2020-09-03 RX ORDER — LISINOPRIL AND HYDROCHLOROTHIAZIDE 12.5; 2 MG/1; MG/1
TABLET ORAL DAILY
COMMUNITY
End: 2020-12-06

## 2020-09-03 RX ORDER — FOLIC ACID 1 MG/1
TABLET ORAL DAILY
COMMUNITY
End: 2021-04-11 | Stop reason: ALTCHOICE

## 2020-09-03 NOTE — PROGRESS NOTES
Raquel Shannon is a 72 y.o. female right handed retiree. Worker's Compensation and legal considerations: none filed. Vitals:    09/03/20 0930   BP: 128/79   Pulse: 80   Resp: 16   Temp: (!) 96.4 °F (35.8 °C)   TempSrc: Oral   SpO2: 100%   Weight: 162 lb (73.5 kg)   Height: 5' 5\" (1.651 m)   PainSc:   4   PainLoc: Hand           Chief Complaint   Patient presents with    Hand Pain     Bilateral (right worse)    Wrist Pain     Right ganglion cyst)         HPI: Patient comes in today with a history of rheumatoid arthritis and ganglion cyst that she has had in the past and had drained. She reports recently having a mass over her right wrist as well as a new wound that is developing just proximal to this. She has [place] and has recently stopped her rheumatoid medicine and will be starting back on a new one. Date of onset:  indeterminate    Injury: No    Prior Treatment:  No    Numbness/ Tingling: No      ROS: Review of Systems - General ROS: negative  Psychological ROS: negative  ENT ROS: negative  Allergy and Immunology ROS: negative  Hematological and Lymphatic ROS: negative  Respiratory ROS: no cough, shortness of breath, or wheezing  Cardiovascular ROS: no chest pain or dyspnea on exertion  Gastrointestinal ROS: no abdominal pain, change in bowel habits, or black or bloody stools  Musculoskeletal ROS: positive for - pain in wrist - right  Neurological ROS: negative  Dermatological ROS: negative    Past Medical History:   Diagnosis Date    Arthritis     Rheumatoid arthritis    Ganglion 10/2019    Glaucoma     Hypertension     Osteoporosis        Past Surgical History:   Procedure Laterality Date    HX CARPAL TUNNEL RELEASE Left 2017    HX CARPAL TUNNEL RELEASE Right 2016    HX CHOLECYSTECTOMY  1993       Current Outpatient Medications   Medication Sig Dispense Refill    amLODIPine (NORVASC) 5 mg tablet Take 5 mg by mouth daily.       dorzolamide-timolol, PF, (COSOPT) 2-0.5 % ophthalmic solution Administer 1 Drop to both eyes two (2) times a day.  folic acid (FOLVITE) 1 mg tablet Take  by mouth daily.  latanoprost (XALATAN) 0.005 % ophthalmic solution Administer 1 Drop to both eyes nightly.  lisinopril-hydroCHLOROthiazide (PRINZIDE, ZESTORETIC) 20-12.5 mg per tablet Take  by mouth daily.  multivitamin, tx-iron-ca-min (THERA-M w/ IRON) 9 mg iron-400 mcg tab tablet Take 1 Tab by mouth daily.  cholecalciferol (Vitamin D3) 25 mcg (1,000 unit) cap Take  by mouth daily.  triamcinolone acetonide (KENALOG) 10 mg/mL injection 1 mL by Intra artICUlar route once for 1 dose. 1 Vial 0    alendronate (Fosamax) 70 mg tablet Take  by mouth.  varicella-zoster recombinant, PF, (Shingrix, PF,) 50 mcg/0.5 mL susr injection 0.5mL by IntraMUSCular route once now and then repeat in 2-6 months 0.5 mL 1       No Known Allergies        PE:     Physical Exam  Vitals signs and nursing note reviewed. Constitutional:       General: She is not in acute distress. Appearance: Normal appearance. She is not ill-appearing. Eyes:      Pupils: Pupils are equal, round, and reactive to light. Neck:      Musculoskeletal: Normal range of motion. Cardiovascular:      Pulses: Normal pulses. Pulmonary:      Effort: Pulmonary effort is normal. No respiratory distress. Abdominal:      General: Abdomen is flat. Musculoskeletal: Normal range of motion. General: Swelling and tenderness present. No deformity or signs of injury. Right lower leg: No edema. Left lower leg: No edema. Skin:     General: Skin is warm and dry. Capillary Refill: Capillary refill takes less than 2 seconds. Findings: No bruising or erythema. Neurological:      General: No focal deficit present. Mental Status: She is alert and oriented to person, place, and time. Motor: No weakness.    Psychiatric:         Mood and Affect: Mood normal.         Behavior: Behavior normal.            Right upper extremity: There is tenderness to palpation and synovitis noted about the MP joints of the right hand. Additionally there is a 2 cm compressible nontender mass about the dorsal wrist as well as a 1 cm mass just proximal to this. Sensation is grossly intact distally and cap refill is brisk. Imaging:     External plain films of bilateral hands were reviewed that does not show any fracture dislocation and moderate degenerative changes. ICD-10-CM ICD-9-CM    1. Ganglion cyst of dorsum of right wrist  M67.431 727.41 TRIAMCINOLONE ACETONIDE INJ      triamcinolone acetonide (KENALOG) 10 mg/mL injection      ASPIRAT/INJECTION GANGLION CYST(S)   2. Rheumatoid nodule of right elbow ([facility])  M06.321 714.0    3. Rheumatoid arthritis involving both hands, unspecified rheumatoid factor presence ([facility])  M06.9 714.0          Plan:     Right dorsal wrist ganglion decompression and steroid injection. On injection of the cyst and aspiration it appeared to be more inflammatory fluid than ganglion in nature. Follow-up and Dispositions    · Return if symptoms worsen or fail to improve.           Plan was reviewed with patient, who verbalized agreement and understanding of the plan    [facility] [facility] NOTE        Chart reviewed for the following:   IAc DO, have reviewed the History, Physical and updated the Allergic reactions for [address] performed immediately prior to start of procedure:   Ac HALE DO, have performed the following reviews on Boyd Javier prior to the start of the procedure:            * Patient was identified by name and date of birth   * Agreement on procedure being performed was verified  * Risks and Benefits explained to the patient  * Procedure site verified and marked as necessary  * Patient was positioned for comfort  * Consent was signed and verified     Time: 10:10 AM      Date of procedure: 9/3/2020    Procedure performed by: Ana Maria Chan DO    Provider assisted by: Ashley Epstein LPN    Patient assisted by: self    How tolerated by patient: tolerated the procedure well with no complications    Post Procedural Pain Scale: 0 - No Hurt    Comments: none    Procedure:  After consent was obtained, using sterile technique the ganglion was prepped. Local anesthetic used: 1% lidocaine. Kenalog 5 mg and was then injected and the needle withdrawn. The procedure was well tolerated. The patient is asked to continue to rest the area for a few more days before resuming regular activities. It may be more painful for the first 1-2 days. Watch for fever, or increased swelling or persistent pain in the joint. Call or return to clinic prn if such symptoms occur or there is failure to improve as anticipated.

## 2020-10-02 ENCOUNTER — OFFICE VISIT (OUTPATIENT)
Dept: ORTHOPEDIC SURGERY | Age: 65
End: 2020-10-02
Payer: MEDICARE

## 2020-10-02 VITALS
HEIGHT: 65 IN | TEMPERATURE: 96.9 F | OXYGEN SATURATION: 98 % | WEIGHT: 162 LBS | SYSTOLIC BLOOD PRESSURE: 114 MMHG | DIASTOLIC BLOOD PRESSURE: 74 MMHG | BODY MASS INDEX: 26.99 KG/M2 | HEART RATE: 79 BPM

## 2020-10-02 DIAGNOSIS — M25.551 RIGHT HIP PAIN: Primary | ICD-10-CM

## 2020-10-02 PROCEDURE — 73502 X-RAY EXAM HIP UNI 2-3 VIEWS: CPT | Performed by: ORTHOPAEDIC SURGERY

## 2020-10-02 PROCEDURE — 99214 OFFICE O/P EST MOD 30 MIN: CPT | Performed by: ORTHOPAEDIC SURGERY

## 2020-10-02 NOTE — PROGRESS NOTES
Patient: Augustus Oquendo                MRN: [id number]       SSN: [id number]  YOB: 1955        AGE: 72 y.o. SEX: female  Body mass index is 26.96 kg/m². PCP: Joseph Nix MD  10/02/20    HISTORY:  I had the pleasure of viewing Mary Villatoro. I look after her sister. She has rheumatoid arthritis, she was on steroids for years. Apparently she had a bone density scan up in [place] in February and I would to get a copy of that. On x-ray it does appear that she has osteoporosis, at least osteopenia. She is mainly presenting today with right knee pain. She had a fall, was seen at [facility], apparently there might be a fracture involving the talus and/or ankle. She is going to be seeing Dr. Iggy Jeffries in the next couple days. She is in a boot currently and comfortable with this. She had fallen on the left knee with an abrasion and the radiologist thought there might be a lateral tibial plateau crack on one view. She is really not complaining of too much discomfort with regards to the left knee or tibia, and her main complaint is with right hip pain. Today is not a bad day. Other days can be worse. She has known arthritis in that region. PHYSICAL EXAMINATION:  On examination today, she is a very pleasant lady in no acute distress. The left hip rotates normally. The right hip is a touch stiff, which does reproduce some groin discomfort that is mild to moderate. She has an abrasion over the left knee, quads are intact, and not particularly tender over the proximal tibia. RADIOGRAPHS:  Outside x-rays reviewed. I do not think there is an acute fracture. I have explained to the family that if she develops any pain in the left knee, we can repeat x-ray and get MRI, but she is really not complaining of any discomfort there.   For the right hip, x-rays obtained today in the office, including AP pelvis, AP and lateral of the hip, on 10/02/20, confirm end stage arthritis of the right hip, likely with avascular necrosis on osteoporosis. At the next visit I would like to review her MRI of the right hip. I would like to review her bone density test.  Dr. Siena Gee will be seeing her for her foot and ankle, and if there is any issue with the left knee, we will investigate further. She is headed towards a hip replacement operation and pain and bone density will be dictating the timing of this procedure. It has been an absolute pleasure to share in her care. Yours very sincerely,    Ruiz Saleh MD, [facility]     C:     MD Albertina Wilson MD         REVIEW OF SYSTEMS:      CON: negative  EYE: negative   ENT: negative  RESP: negative  GI:    negative   :  negative  MSK: Positive  A twelve point review of systems was completed, positives noted and all other systems were reviewed and are negative          Past Medical History:   Diagnosis Date    Arthritis     Rheumatoid arthritis    Ganglion 10/2019    Glaucoma     Hypertension     Osteoporosis        Family History   Problem Relation Age of Onset    Heart Disease Mother     Arthritis-osteo Father     Liver Disease Father     Diabetes Sister     Cancer Maternal Aunt     Cancer Maternal Grandmother        Current Outpatient Medications   Medication Sig Dispense Refill    amLODIPine (NORVASC) 5 mg tablet Take 5 mg by mouth daily.  dorzolamide-timolol, PF, (COSOPT) 2-0.5 % ophthalmic solution Administer 1 Drop to both eyes two (2) times a day.  latanoprost (XALATAN) 0.005 % ophthalmic solution Administer 1 Drop to both eyes nightly.  lisinopril-hydroCHLOROthiazide (PRINZIDE, ZESTORETIC) 20-12.5 mg per tablet Take  by mouth daily.  multivitamin, tx-iron-ca-min (THERA-M w/ IRON) 9 mg iron-400 mcg tab tablet Take 1 Tab by mouth daily.  folic acid (FOLVITE) 1 mg tablet Take  by mouth daily.  cholecalciferol (Vitamin D3) 25 mcg (1,000 unit) cap Take  by mouth daily.       alendronate (Fosamax) 70 mg tablet Take  by mouth.       varicella-zoster recombinant, PF, (Shingrix, PF,) 50 mcg/0.5 mL susr injection 0.5mL by IntraMUSCular route once now and then repeat in 2-6 months 0.5 mL 1       No Known Allergies    Past Surgical History:   Procedure Laterality Date    HX ANKLE FRACTURE TX Right 09/30/2020    HX CARPAL TUNNEL RELEASE Left 2017    HX CARPAL TUNNEL RELEASE Right 2016    HX CHOLECYSTECTOMY  1993       Social History     Socioeconomic History    Marital status: UNKNOWN     Spouse name: Not on file    Number of children: Not on file    Years of education: Not on file    Highest education level: Not on file   Occupational History    Not on file   Social Needs    Financial resource strain: Not on file    Food insecurity     Worry: Not on file     Inability: Not on file    Transportation needs     Medical: Not on file     Non-medical: Not on file   Tobacco Use    Smoking status: Never Smoker    Smokeless tobacco: Never Used   Substance and Sexual Activity    Alcohol use: Never     Frequency: Never    Drug use: Never    Sexual activity: Not on file   Lifestyle    Physical activity     Days per week: Not on file     Minutes per session: Not on file    Stress: Not on file   Relationships    Social connections     Talks on phone: Not on file     Gets together: Not on file     Attends Mandaeism service: Not on file     Active member of club or organization: Not on file     Attends meetings of clubs or organizations: Not on file     Relationship status: Not on file    Intimate partner violence     Fear of current or ex partner: Not on file     Emotionally abused: Not on file     Physically abused: Not on file     Forced sexual activity: Not on file   Other Topics Concern    Not on file   Social History Narrative    Not on file       Visit Vitals  /74 (BP 1 Location: Left arm, BP Patient Position: Sitting)   Pulse 79   Temp 96.9 °F (36.1 °C) (Temporal)   Ht 5' 5\" (1.651 m)   Wt 162 lb (73.5 kg)   SpO2 98%   BMI 26.96 kg/m²         PHYSICAL EXAMINATION:  GENERAL: Alert and oriented x3, in no acute distress, well-developed, well-nourished, afebrile. HEART: No JVD. EYES: No scleral icterus   NECK: No significant lymphadenopathy   LUNGS: No respiratory compromise or indrawing  ABDOMEN: Soft, non-tender, non-distended. Electronically signed by:  Moshe Walker MD

## 2020-10-07 ENCOUNTER — OFFICE VISIT (OUTPATIENT)
Dept: ORTHOPEDIC SURGERY | Age: 65
End: 2020-10-07
Payer: MEDICARE

## 2020-10-07 VITALS
WEIGHT: 162 LBS | SYSTOLIC BLOOD PRESSURE: 111 MMHG | BODY MASS INDEX: 26.99 KG/M2 | DIASTOLIC BLOOD PRESSURE: 69 MMHG | HEART RATE: 94 BPM | OXYGEN SATURATION: 97 % | TEMPERATURE: 97.3 F | RESPIRATION RATE: 16 BRPM | HEIGHT: 65 IN

## 2020-10-07 DIAGNOSIS — S92.124A CLOSED NONDISPLACED FRACTURE OF BODY OF RIGHT TALUS, INITIAL ENCOUNTER: ICD-10-CM

## 2020-10-07 DIAGNOSIS — S93.491A SPRAIN OF ANTERIOR TALOFIBULAR LIGAMENT OF RIGHT ANKLE, INITIAL ENCOUNTER: Primary | ICD-10-CM

## 2020-10-07 PROCEDURE — G9899 SCRN MAM PERF RSLTS DOC: HCPCS | Performed by: ORTHOPAEDIC SURGERY

## 2020-10-07 PROCEDURE — 28430 CLTX TALUS FRACTURE W/O MNPJ: CPT | Performed by: ORTHOPAEDIC SURGERY

## 2020-10-07 PROCEDURE — G8752 SYS BP LESS 140: HCPCS | Performed by: ORTHOPAEDIC SURGERY

## 2020-10-07 PROCEDURE — G8536 NO DOC ELDER MAL SCRN: HCPCS | Performed by: ORTHOPAEDIC SURGERY

## 2020-10-07 PROCEDURE — G8427 DOCREV CUR MEDS BY ELIG CLIN: HCPCS | Performed by: ORTHOPAEDIC SURGERY

## 2020-10-07 PROCEDURE — G8400 PT W/DXA NO RESULTS DOC: HCPCS | Performed by: ORTHOPAEDIC SURGERY

## 2020-10-07 PROCEDURE — 1090F PRES/ABSN URINE INCON ASSESS: CPT | Performed by: ORTHOPAEDIC SURGERY

## 2020-10-07 PROCEDURE — 3017F COLORECTAL CA SCREEN DOC REV: CPT | Performed by: ORTHOPAEDIC SURGERY

## 2020-10-07 PROCEDURE — G8510 SCR DEP NEG, NO PLAN REQD: HCPCS | Performed by: ORTHOPAEDIC SURGERY

## 2020-10-07 PROCEDURE — G8754 DIAS BP LESS 90: HCPCS | Performed by: ORTHOPAEDIC SURGERY

## 2020-10-07 PROCEDURE — G8419 CALC BMI OUT NRM PARAM NOF/U: HCPCS | Performed by: ORTHOPAEDIC SURGERY

## 2020-10-07 PROCEDURE — 99214 OFFICE O/P EST MOD 30 MIN: CPT | Performed by: ORTHOPAEDIC SURGERY

## 2020-10-07 PROCEDURE — 1101F PT FALLS ASSESS-DOCD LE1/YR: CPT | Performed by: ORTHOPAEDIC SURGERY

## 2020-10-07 NOTE — PROGRESS NOTES
AMBULATORY PROGRESS NOTE      Patient: Cami Lei             MRN: [id number]     SSN: [id number] Body mass index is 26.96 kg/m². YOB: 1955     AGE: 72 y.o. EX: female    PCP: Vilma Fernandez MD       IMPRESSION //  DIAGNOSIS AND TREATMENT PLAN      DIAGNOSES  1. Sprain of anterior talofibular ligament of right ankle, initial encounter    2. Closed nondisplaced fracture of body of right talus, initial encounter        Orders Placed This Encounter    Generic Supply Order     right Airsport Aircast ankle brace    TX CLOSED RX TALUS FX      She is resolving right ankle sprain, has a avulsion fracture of the dorsum of her talus and lateral radiographic x-ray but has normal stability of her ankle. He did protect her knee as per Aircast brace for least 3 weeks then wean this off reassess her in 4 weeks we will get x-rays of her right ankle upon next visit. She does have rheumatoid arthritis and sees Dr. Marcela Srivastava. PLAN:    1. Patient received instructions for ankle strengthening exercises with Theraband  2. Wean boot, use only for severe pain   3. DME order: right Airsport Aircast ankle brace (wear for 3 weeks)    RTO- 1 month    PLEASE OBTAIN X-RAYS OF: right ankle 3 VIEWS nonweightbearing. HPI //  Agnes 1980 IS A 72 y.o. female who presents to my outpatient office for evaluation of: right ankle pain. Patient was referred by Dr. Lilliana George. The patient reports that she fell in the store while pushing a cart on September 30th, 2020. The patient reports that she tripped and fell over an item in the aisle. She states that she did not have immediate pain when she fell. She describes having mild throbbing pain to anterior aspect of ankle initially. The patient states [facility] and was placed into a boot. She states that the boot has given her profound relief. She reports no major pain.  She does not wear the boot in her home; she wears an ace bandage. Outside her home, she has been wearing the boot. The patient has RA, she is being managed by Dr. Rosibel Smith for this. Visit Vitals  /69 (BP 1 Location: Left arm, BP Patient Position: Sitting)   Pulse 94   Temp 97.3 °F (36.3 °C) (Temporal)   Resp 16   Ht 5' 5\" (1.651 m)   Wt 162 lb (73.5 kg)   SpO2 97%   BMI 26.96 kg/m²            She arrives here with x-rays, from [facility], dated 9/30/2020: 3 view right ankle: On close inspection, there is swelling to the medial lateral aspect of her of this right ankle. There is a small bony fragment along the dorsal part of the talus, in the lateral radiographic x-ray, with associated soft tissue swelling. I see no subluxation or dislocation, these 3 views. ANKLE/FOOT right    Psychiatry: Alert, oriented x 3 (name,place,time of day); speech normal in context and clarity, memory intact grossly, no involuntary movements - tremors, no dementia  Gait: uses assistive device boot  Tenderness: non-tender posterolaterally  //mild tenderness anterior portion of her talus into portion of her ankle  Cutaneous: WNL  Joint Motion: limited inversion  Joint / Tendon Stability: No Ankle or Subtalar instability or joint laxity. No peroneal sublux ability or dislocation  Alignment: neutral Hindfoot, none Metatarsus Adductus Metatarsus. Neuro Motor/Sensory: NL/NL,  Vascular: NL foot/ankle pulses,   Lymphatics: No extremity lymphedema, No calf swelling, no tenderness to calf muscles. CHART REVIEW     There is no problem list on file for this patient. Domitila Peng has been experiencing pain and discomfort confirmed as outlined in the pain assessment outlined below.       Pain Assessment  10/7/2020   Location of Pain Ankle   Location Modifiers Right   Severity of Pain 0   Quality of Pain -   Duration of Pain -   Duration of Pain Comment -   Frequency of Pain -   Date Pain First Started -   Aggravating Factors -   Aggravating Factors Comment -   Limiting Behavior -   Relieving Factors -   Relieving Factors Comment -   Result of Injury No Channie Nissen  has a past medical history of Arthritis, Ganglion (10/2019), Glaucoma, Hypertension, and Osteoporosis. Patients is employed at:         Past Medical History:   Diagnosis Date    Arthritis     Rheumatoid arthritis    Ganglion 10/2019    Glaucoma     Hypertension     Osteoporosis      Past Surgical History:   Procedure Laterality Date    HX ANKLE FRACTURE TX Right 09/30/2020    HX CARPAL TUNNEL RELEASE Left 2017    HX CARPAL TUNNEL RELEASE Right 2016    HX CHOLECYSTECTOMY  1993     Current Outpatient Medications   Medication Sig    amLODIPine (NORVASC) 5 mg tablet Take 5 mg by mouth daily.  dorzolamide-timolol, PF, (COSOPT) 2-0.5 % ophthalmic solution Administer 1 Drop to both eyes two (2) times a day.  latanoprost (XALATAN) 0.005 % ophthalmic solution Administer 1 Drop to both eyes nightly.  lisinopril-hydroCHLOROthiazide (PRINZIDE, ZESTORETIC) 20-12.5 mg per tablet Take  by mouth daily.  multivitamin, tx-iron-ca-min (THERA-M w/ IRON) 9 mg iron-400 mcg tab tablet Take 1 Tab by mouth daily.  cholecalciferol (Vitamin D3) 25 mcg (1,000 unit) cap Take  by mouth daily.  alendronate (Fosamax) 70 mg tablet Take  by mouth.  varicella-zoster recombinant, PF, (Shingrix, PF,) 50 mcg/0.5 mL susr injection 0.5mL by IntraMUSCular route once now and then repeat in 2-6 months    folic acid (FOLVITE) 1 mg tablet Take  by mouth daily. No current facility-administered medications for this visit.       No Known Allergies  Social History     Occupational History    Not on file   Tobacco Use    Smoking status: Never Smoker    Smokeless tobacco: Never Used   Substance and Sexual Activity    Alcohol use: Never     Frequency: Never    Drug use: Never    Sexual activity: Not on file     Family History   Problem Relation Age of Onset    Heart Disease Mother     Arthritis-osteo Father     Liver Disease Father     Diabetes Sister     Cancer Maternal Aunt     Cancer Maternal Grandmother        THE  FOR Connor Quiroga BY Vicente Shen MD 10/7/2020 . DIAGNOSTIC IMAGING  LAB DATA      No results found for: HBA1C, HGBE8, AVX2IWPT, IGB0OHEF // No results found for: GLU, GLUCPOC     No results found for: IJW0LQXC, RSJ1KKKE      No results found for: VITD3, XQVID2, XQVID3, XQVID, VD3RIA, [id number]      REVIEW OF SYSTEMS : 10/7/2020  ALL BELOW ARE Negative except : SEE HPI     CONSTITUTIONAL: No weight loss  PSYCHOLOGICAL : No Feelings of anxiety, depression, agitation  EYES: No blurred vision and no eye discharge. NO eye pain, double vision  ENT: No nasal discharge. No ear pain. CARDIOVASCULAR: No chest pain and no diaphoresis. RESPIRATORY: No cough, no hemoptysis. GI: No vomiting, no diarrhea   : No urinary frequency and no dysuria. MUSCULOSKELETAL: see HPI  SKIN: No rashes. NEURO:  No dizziness,weakness, headaches// No visual changes or confusion, or seizures,   ENDOCRINE: No polyphagia and no polydipsia. HEMATOLOGY: No bleeding tendencies. DIAGNOSTIC IMAGING      Please see above section of this report. I have personally reviewed the results of the above study. The interpretation of this study is my professional opinion.       Vicente Shen MD  10/7/2020  1:28 PM

## 2020-10-09 DIAGNOSIS — M25.551 RIGHT HIP PAIN: ICD-10-CM

## 2020-10-10 NOTE — PROGRESS NOTES
Consent: Kaitlynn Steel, who was seen by synchronous (real-time) audio-video technology, and/or her healthcare decision maker, is aware that this patient-initiated, Telehealth encounter on 10/13/2020 is a billable service, with coverage as determined by her insurance carrier. She is aware that she may receive a bill and has provided verbal consent to proceed: Yes. The patient was at home and I was at the offices of the [address] [place] [address] no one else participated in the service. ASSESSMENT and PLAN    ICD-10-CM ICD-9-CM    1. Essential hypertension  I10 401.9     This is a chronic problem. It is presently well controlled. We will continue the same treatment. 2. Encounter for immunization  Z23 V03.89 diph,Pastora,Ceasar,,Tet Vac-PF (ADACEL) 2 Lf-(2.5-5-3-5 mcg)-5Lf/0.5 mL susp      pneumococcal 23-valent (PNEUMOVAX 23) 25 mcg/0.5 mL injection      varicella-zoster recombinant, PF, (Shingrix, PF,) 50 mcg/0.5 mL susr injection    Prescription written for Tdap shingles vaccine   3. Osteoporosis, unspecified osteoporosis type, unspecified pathological fracture presence  M81.0 733.00 calcium carbonate (Calcium 500) 500 mg calcium (1,250 mg) chewable tablet    On Fosamax and vitamin D. We have added calcium. Follow-up   4. Hyperlipidemia, unspecified hyperlipidemia type  E78.5 272.4     Patient is going to furnish us with her most recent labs from [place]. 5. Rheumatoid arthritis, involving unspecified site, unspecified whether rheumatoid factor present ([facility]  M06.9 714.0     We will follow this along with the specialist that the patient sees for this problem. No change in medication or treatment on our part at this time.          lab results and schedule of future lab studies reviewed with patient  reviewed diet, exercise and weight control  radiology results and schedule of future radiology studies reviewed with patient      Health Maintenance Due   Topic Date Due    Hepatitis C Screening 1955    DTaP/Tdap/Td series (1 - Tdap) 01/25/1976    Lipid Screen  01/25/1995    Shingrix Vaccine Age 50> (1 of 2) 01/25/2005    Breast Cancer Screen Mammogram  01/25/2005    FOBT Q1Y Age 50-75  01/25/2005    GLAUCOMA SCREENING Q2Y  01/25/2020    Bone Densitometry (Dexa) Screening  01/25/2020    Pneumococcal 65+ years (1 of 1 - PPSV23) 01/25/2020    Flu Vaccine (1) 09/01/2020   Dr. Leena Gloria on  Vaccines while on treatment    712  Subjective:   Augustus Oquendo is a 72 y.o. female who was seen for follow-up. She was previously seen for essential hypertension. She also has glaucoma and was a former smoker. She stopped smoking in 2003. On the last visit she was drinking 5-7 times a week, usually just a glass of wine. The patient has hypercholesterolemia and we are going to get a copy of her lipids from [address] [place] [address] the last visit she was referred for colonoscopy mammogram screening and immunizations. The patient is status post fall and [facility]  There was a question of a fracture involving the talus and ankle. The patient was placed in a boot. She is following up with orthopedics. X-rays showed evidence of possible osteopenia/osteoporosis. Right ankle fracture/Left knee fracture  The patient states that she tripped over downed shelves in a department store September 30 sustaining the injuries. She is presently in a boot. She is being followed up by orthopedist with specialties and knee and foot. She had a DEXA scan that was positive for osteoporosis. She was started on Fosamax. She is also on vitamin D3 1000 mg daily  RA   she states it primarily affects her wrists. Her fingers are also affected. She has carried this diagnosis since 2011  She was formerly on Humira. She was also taking methotrexate and folic acid. The last time that she took these medications was February 2020   She has had carpal tunnel surgery bilaterally.   Sees Dr. Camila Hobsb for rheumatology follow-up. Cimzia has been started. Ganglion cyst  Ganglion cyst drained on September 3. No further problem has been noted there. The patient denies a runny nose or nasal congestion, sore throat or difficulty swallowing, clogged feeling in the ears or ear pain. Since this is  Hypertension  11/69  The patient has had no problem with the medication. The patient has no headaches, visual changes, chest pain or pressure,dyspnea, orthopnea, abdominal pain, dysuria, weakness, or paresthesias. BP Readings from Last 3 Encounters:   10/07/20 111/69   10/02/20 114/74   09/03/20 128/79          Key CAD CHF Meds             amLODIPine (NORVASC) 5 mg tablet Take 5 mg by mouth daily. lisinopril-hydroCHLOROthiazide (PRINZIDE, ZESTORETIC) 20-12.5 mg per tablet Take  by mouth daily. Hyperlipidemia  She is not presently on any medications. She is planning to send us a copy of her most recent report from [place] where she moved from. The patient denies muscle aches, headache, GI symptoms or difficulty with mentation. Key Antihyperlipidemia Meds     The patient is on no antihyperlipidemia meds. Glaucoma  The patient states that this is a chronic problem no eye pain is admitted to. She will find a new ophthalmologist in our area. Her medications are the following;  Dorzolamide-timolol 22.3-6.8 mg  Cosopt}Igg ou bid  Latanoprost.. 005%Igtt hs  Eye pressures 19/20  Alcohol use  The patient does drink alcohol, primarily wine. She is cutting down down on the amount. She drinks less than 5 glasses a week now. Current Outpatient Medications   Medication Sig    amLODIPine (NORVASC) 5 mg tablet Take 5 mg by mouth daily.  dorzolamide-timolol, PF, (COSOPT) 2-0.5 % ophthalmic solution Administer 1 Drop to both eyes two (2) times a day.  folic acid (FOLVITE) 1 mg tablet Take  by mouth daily.  latanoprost (XALATAN) 0.005 % ophthalmic solution Administer 1 Drop to both eyes nightly.     lisinopril-hydroCHLOROthiazide (PRINZIDE, ZESTORETIC) 20-12.5 mg per tablet Take  by mouth daily.  multivitamin, tx-iron-ca-min (THERA-M w/ IRON) 9 mg iron-400 mcg tab tablet Take 1 Tab by mouth daily.  cholecalciferol (Vitamin D3) 25 mcg (1,000 unit) cap Take  by mouth daily.  alendronate (Fosamax) 70 mg tablet Take  by mouth.  varicella-zoster recombinant, PF, (Shingrix, PF,) 50 mcg/0.5 mL susr injection 0.5mL by IntraMUSCular route once now and then repeat in 2-6 months     No current facility-administered medications for this visit. No Known Allergies  does not have a problem list on file. Past Surgical History:   Procedure Laterality Date    HX ANKLE FRACTURE TX Right 09/30/2020    HX CARPAL TUNNEL RELEASE Left 2017    HX CARPAL TUNNEL RELEASE Right 2016    HX CHOLECYSTECTOMY  1993     Relationships   Social connections    Talks on phone: Not on file    Gets together: Not on file    Attends [employer] service: Not on file    Active member of club or organization: Not on file    Attends meetings of clubs or organizations: Not on file    Relationship status: Not on file     family history includes Arthritis-osteo in her father; Cancer in her maternal aunt and maternal grandmother; Diabetes in her sister; Heart Disease in her mother; Liver Disease in her father. ROS  Physical Exam  Constitutional:       General: She is not in acute distress. HENT:      Right Ear: External ear normal.      Left Ear: External ear normal.      Mouth/Throat:      Mouth: Mucous membranes are moist.      Pharynx: Oropharynx is clear. Eyes:      General: No scleral icterus. Pupils: Pupils are equal, round, and reactive to light. Neck:      Musculoskeletal: No neck rigidity. Pulmonary:      Effort: Pulmonary effort is normal.   Musculoskeletal:         General: No swelling. Right lower leg: No edema. Left lower leg: No edema. Comments: Boot worn right foot. She removed it.   No significant lesions swelling or redness noted. No ecchymoses noted. Tenderness on moving the knee and the ankle on that side. Skin:     Coloration: Skin is not pale. Findings: No erythema. Neurological:      Mental Status: She is alert and oriented to person, place, and time. Gait: Gait normal.   Psychiatric:         Mood and Affect: Mood normal.         Behavior: Behavior normal.         Thought Content: Thought content normal.            We discussed the expected course, resolution and complications of the diagnosis(es) in detail. Medication risks, benefits, costs, interactions, and alternatives were discussed as indicated. I advised her to contact the office if her condition worsens, changes or fails to improve as anticipated. She expressed understanding with the diagnosis(es) and plan. Joyce Cast is a 72 y.o. female being evaluated by a video visit encounter for concerns as above. A caregiver was present when appropriate. Due to this being a TeleHealth encounter (During [facility]- public health emergency), evaluation of the following organ systems was limited: Vitals/Constitutional/EENT/Resp/CV/GI//MS/Neuro/Skin/Heme-Lymph-Imm. Pursuant to the emergency declaration under the [facility], 1135 waiver authority and the [employer] Act, this Virtual  Visit was conducted, with patient's (and/or legal guardian's) consent, to reduce the patient's risk of exposure to COVID-19 and provide necessary medical care. Martin Hitchcock MD    This note was done with the assistance of dragon speech software.   Some inadvertent errors or omissions may be present

## 2020-10-13 ENCOUNTER — VIRTUAL VISIT (OUTPATIENT)
Dept: FAMILY MEDICINE CLINIC | Age: 65
End: 2020-10-13
Payer: MEDICARE

## 2020-10-13 DIAGNOSIS — E78.5 HYPERLIPIDEMIA, UNSPECIFIED HYPERLIPIDEMIA TYPE: ICD-10-CM

## 2020-10-13 DIAGNOSIS — I10 ESSENTIAL HYPERTENSION: Primary | ICD-10-CM

## 2020-10-13 DIAGNOSIS — M81.0 OSTEOPOROSIS, UNSPECIFIED OSTEOPOROSIS TYPE, UNSPECIFIED PATHOLOGICAL FRACTURE PRESENCE: ICD-10-CM

## 2020-10-13 DIAGNOSIS — M06.9 RHEUMATOID ARTHRITIS, INVOLVING UNSPECIFIED SITE, UNSPECIFIED WHETHER RHEUMATOID FACTOR PRESENT (HCC): ICD-10-CM

## 2020-10-13 DIAGNOSIS — Z23 ENCOUNTER FOR IMMUNIZATION: ICD-10-CM

## 2020-10-13 PROCEDURE — 99214 OFFICE O/P EST MOD 30 MIN: CPT | Performed by: EMERGENCY MEDICINE

## 2020-10-13 PROCEDURE — 1090F PRES/ABSN URINE INCON ASSESS: CPT | Performed by: EMERGENCY MEDICINE

## 2020-10-13 PROCEDURE — G9899 SCRN MAM PERF RSLTS DOC: HCPCS | Performed by: EMERGENCY MEDICINE

## 2020-10-13 PROCEDURE — G8510 SCR DEP NEG, NO PLAN REQD: HCPCS | Performed by: EMERGENCY MEDICINE

## 2020-10-13 PROCEDURE — 3017F COLORECTAL CA SCREEN DOC REV: CPT | Performed by: EMERGENCY MEDICINE

## 2020-10-13 PROCEDURE — G8756 NO BP MEASURE DOC: HCPCS | Performed by: EMERGENCY MEDICINE

## 2020-10-13 PROCEDURE — 1101F PT FALLS ASSESS-DOCD LE1/YR: CPT | Performed by: EMERGENCY MEDICINE

## 2020-10-13 PROCEDURE — G8428 CUR MEDS NOT DOCUMENT: HCPCS | Performed by: EMERGENCY MEDICINE

## 2020-10-15 RX ORDER — ZOSTER VACCINE RECOMBINANT, ADJUVANTED 50 MCG/0.5
KIT INTRAMUSCULAR
Qty: 0.5 ML | Refills: 1 | Status: SHIPPED | OUTPATIENT
Start: 2020-10-15

## 2020-10-15 RX ORDER — UREA 10 %
1 LOTION (ML) TOPICAL DAILY
Qty: 90 TAB | Refills: 3 | Status: SHIPPED | OUTPATIENT
Start: 2020-10-15 | End: 2021-04-11

## 2020-10-22 ENCOUNTER — HOSPITAL ENCOUNTER (OUTPATIENT)
Age: 65
Discharge: HOME | End: 2020-10-22
Attending: ORTHOPAEDIC SURGERY
Payer: MEDICARE

## 2020-10-22 PROCEDURE — 73721 MRI JNT OF LWR EXTRE W/O DYE: CPT

## 2020-11-04 ENCOUNTER — OFFICE VISIT (OUTPATIENT)
Dept: ORTHOPEDIC SURGERY | Age: 65
End: 2020-11-04
Payer: MEDICARE

## 2020-11-04 VITALS
SYSTOLIC BLOOD PRESSURE: 108 MMHG | DIASTOLIC BLOOD PRESSURE: 60 MMHG | TEMPERATURE: 97.1 F | HEART RATE: 99 BPM | BODY MASS INDEX: 27.72 KG/M2 | RESPIRATION RATE: 15 BRPM | HEIGHT: 65 IN | WEIGHT: 166.4 LBS

## 2020-11-04 DIAGNOSIS — S93.491D SPRAIN OF ANTERIOR TALOFIBULAR LIGAMENT OF RIGHT ANKLE, SUBSEQUENT ENCOUNTER: Primary | ICD-10-CM

## 2020-11-04 DIAGNOSIS — M25.571 ACUTE RIGHT ANKLE PAIN: ICD-10-CM

## 2020-11-04 PROCEDURE — 73610 X-RAY EXAM OF ANKLE: CPT | Performed by: ORTHOPAEDIC SURGERY

## 2020-11-04 PROCEDURE — 99024 POSTOP FOLLOW-UP VISIT: CPT | Performed by: ORTHOPAEDIC SURGERY

## 2020-11-04 NOTE — PROGRESS NOTES
AMBULATORY PROGRESS NOTE      Patient: Kaitlynn Steel             MRN: [id number]     SSN: [id number] Body mass index is 27.69 kg/m². YOB: 1955     AGE: 72 y.o. EX: female    PCP: Saira Lee MD       IMPRESSION //  DIAGNOSIS AND TREATMENT PLAN      DIAGNOSES  1. Sprain of anterior talofibular ligament of right ankle, subsequent encounter    2. Acute right ankle pain        Orders Placed This Encounter    [[id number]] Ankle Min 3V     Order Specific Question:   Weight bearing? Answer:   No    OTHER     Sig: Cizima for rheumatoid arthritis. Injection (2 injections) at each visit      PLAN:    1. Patient received instructions for ankle strengthening exercises with Theraband  2. Patient has appointment with Dr. Tabatha Bardales tomorrow for her right hip    RTO- as needed      HPI //  OBJECTIVE EXAMINATION      Kaitlynn Steel IS A 72 y.o. female who presents to my outpatient office for follow up evaluation of: sprain of anterior talofibular ligament of the right ankle. At the last OV, I instructed patient to perform HEP: ankle strengthening exercises with Theraband, wean boot and AS/AC ankle brace. Date of Injury: September 30th, 2020    Since the last OV, patient reports much improved pain in her ankle. She is able to stand and walk with no major pain or soreness. She states that she has gone out the brace unless she walks outside for prolonged periods. The patient has RA, she is being managed by Dr. Kelly Varghese for this. Visit Vitals  /60   Pulse 99   Temp 97.1 °F (36.2 °C)   Resp 15   Ht 5' 5\" (1.651 m)   Wt 166 lb 6.4 oz (75.5 kg)   BMI 27.69 kg/m²            She arrives here with x-rays, from [facility] facility, dated 9/30/2020: 3 view right ankle: On close inspection, there is swelling to the medial lateral aspect of her of this right ankle.   There is a small bony fragment along the dorsal part of the talus, in the lateral radiographic x-ray, with associated soft tissue swelling. I see no subluxation or dislocation, these 3 views. ANKLE/FOOT right    Psychiatry: Alert, oriented x 3 (name,place,time of day); speech normal in context and clarity, memory intact grossly, no involuntary movements - tremors, no dementia  Gait: normal  Tenderness: non-tender at this time    Cutaneous: WNL trace swelling anterolaterally  Joint Motion: full ROM  Joint / Tendon Stability: No Ankle or Subtalar instability or joint laxity. No peroneal sublux ability or dislocation  Alignment: neutral Hindfoot, none Metatarsus Adductus Metatarsus. Higher arch  Neuro Motor/Sensory: NL/NL,  Vascular: NL foot/ankle pulses,   Lymphatics: No extremity lymphedema, No calf swelling, no tenderness to calf muscles. CHART REVIEW     There is no problem list on file for this patient. Jacobo York has been experiencing pain and discomfort confirmed as outlined in the pain assessment outlined below. Pain Assessment  11/4/2020   Location of Pain Ankle   Location Modifiers Right   Severity of Pain 0   Quality of Pain -   Duration of Pain -   Duration of Pain Comment -   Frequency of Pain -   Date Pain First Started -   Aggravating Factors -   Aggravating Factors Comment -   Limiting Behavior -   Relieving Factors -   Relieving Factors Comment -   Result of Injury -        Jacobo York  has a past medical history of Arthritis, Ganglion (10/2019), Glaucoma, Hypertension, and Osteoporosis. Patients is employed at:         Past Medical History:   Diagnosis Date    Arthritis     Rheumatoid arthritis    Ganglion 10/2019    Glaucoma     Hypertension     Osteoporosis      Past Surgical History:   Procedure Laterality Date    HX ANKLE FRACTURE TX Right 09/30/2020    HX CARPAL TUNNEL RELEASE Left 2017    HX CARPAL TUNNEL RELEASE Right 2016    HX CHOLECYSTECTOMY  1993     Current Outpatient Medications   Medication Sig    OTHER Cizima for rheumatoid arthritis. Injection (2 injections) at each visit    amLODIPine (NORVASC) 5 mg tablet Take 5 mg by mouth daily.  dorzolamide-timolol, PF, (COSOPT) 2-0.5 % ophthalmic solution Administer 1 Drop to both eyes two (2) times a day.  latanoprost (XALATAN) 0.005 % ophthalmic solution Administer 1 Drop to both eyes nightly.  lisinopril-hydroCHLOROthiazide (PRINZIDE, ZESTORETIC) 20-12.5 mg per tablet Take  by mouth daily.  multivitamin, tx-iron-ca-min (THERA-M w/ IRON) 9 mg iron-400 mcg tab tablet Take 1 Tab by mouth daily.  cholecalciferol (Vitamin D3) 25 mcg (1,000 unit) cap Take  by mouth daily.  alendronate (Fosamax) 70 mg tablet Take  by mouth.  varicella-zoster recombinant, PF, (Shingrix, PF,) 50 mcg/0.5 mL susr injection 0.5mL by IntraMUSCular route once now and then repeat in 2-6 months    calcium carbonate (Calcium 500) 500 mg calcium (1,250 mg) chewable tablet Take 1 Tab by mouth daily.  folic acid (FOLVITE) 1 mg tablet Take  by mouth daily. No current facility-administered medications for this visit. No Known Allergies  Social History     Occupational History    Not on file   Tobacco Use    Smoking status: Never Smoker    Smokeless tobacco: Never Used   Substance and Sexual Activity    Alcohol use: Never     Frequency: Never    Drug use: Never    Sexual activity: Not on file     Family History   Problem Relation Age of Onset    Heart Disease Mother     Arthritis-osteo Father     Liver Disease Father     Diabetes Sister     Cancer Maternal Aunt     Cancer Maternal Grandmother        THE  FOR Daniela Lugo MD 11/4/2020 .       DIAGNOSTIC IMAGING  LAB DATA      No results found for: HBA1C, HGBE8, HRD0CYFS, PRY9TDVQ // No results found for: GLU, GLUCPOC     No results found for: QGF8AWPR, VBQ3JFIL      No results found for: VITD3, Brittny Ser, XQVID, VD3RIA, [id number]      REVIEW OF SYSTEMS : 11/4/2020  ALL BELOW ARE Negative except : SEE HPI     CONSTITUTIONAL: No weight loss  PSYCHOLOGICAL : No Feelings of anxiety, depression, agitation  EYES: No blurred vision and no eye discharge. NO eye pain, double vision  ENT: No nasal discharge. No ear pain. CARDIOVASCULAR: No chest pain and no diaphoresis. RESPIRATORY: No cough, no hemoptysis. GI: No vomiting, no diarrhea   : No urinary frequency and no dysuria. MUSCULOSKELETAL: see HPI  SKIN: No rashes. NEURO:  No dizziness,weakness, headaches// No visual changes or confusion, or seizures,   ENDOCRINE: No polyphagia and no polydipsia. HEMATOLOGY: No bleeding tendencies. DIAGNOSTIC IMAGING       ANKLE X RAYS 3 VIEWS Right   X RAYS AT [facility]  11/4/2020    NON WEIGHT BEARING    X RAYS AT [facility]  11/4/2020    Bones: No fractures or dislocations. No focal osteolytic or osteoblastic process     Bone Spurs: No significant bone spurs//capsular avulsion fracture seen in the lateral radiographic x-ray, along the talus. Alignment: Ankle mortise alignment is congruent, Tibial plafond and talar dome intact. No Osteochondral defects seen   Joint: No Significant OA changes present  Soft Tissues: Normal, No radiopaque foreign body     No abnormal calcific densities to soft tissues    No ankle joint effusion in lateral projection. Mineralization: Suggests no Osteopenia    I have personally reviewed the results of the above study.  The interpretation of this study is my professional opinion          Luis Miranda MD  11/4/2020  1:28 PM

## 2020-11-05 ENCOUNTER — OFFICE VISIT (OUTPATIENT)
Dept: ORTHOPEDIC SURGERY | Age: 65
End: 2020-11-05
Payer: MEDICARE

## 2020-11-05 VITALS
TEMPERATURE: 97.3 F | RESPIRATION RATE: 16 BRPM | BODY MASS INDEX: 27.66 KG/M2 | HEIGHT: 65 IN | WEIGHT: 166 LBS | OXYGEN SATURATION: 98 % | SYSTOLIC BLOOD PRESSURE: 116 MMHG | DIASTOLIC BLOOD PRESSURE: 73 MMHG | HEART RATE: 94 BPM

## 2020-11-05 DIAGNOSIS — M25.551 RIGHT HIP PAIN: Primary | ICD-10-CM

## 2020-11-05 DIAGNOSIS — M85.80 OSTEOPENIA DETERMINED BY X-RAY: ICD-10-CM

## 2020-11-05 DIAGNOSIS — Z78.0 POST-MENOPAUSAL: ICD-10-CM

## 2020-11-05 PROCEDURE — G8400 PT W/DXA NO RESULTS DOC: HCPCS | Performed by: ORTHOPAEDIC SURGERY

## 2020-11-05 PROCEDURE — 99214 OFFICE O/P EST MOD 30 MIN: CPT | Performed by: ORTHOPAEDIC SURGERY

## 2020-11-05 PROCEDURE — 3017F COLORECTAL CA SCREEN DOC REV: CPT | Performed by: ORTHOPAEDIC SURGERY

## 2020-11-05 PROCEDURE — 3288F FALL RISK ASSESSMENT DOCD: CPT | Performed by: ORTHOPAEDIC SURGERY

## 2020-11-05 PROCEDURE — G8510 SCR DEP NEG, NO PLAN REQD: HCPCS | Performed by: ORTHOPAEDIC SURGERY

## 2020-11-05 PROCEDURE — G8536 NO DOC ELDER MAL SCRN: HCPCS | Performed by: ORTHOPAEDIC SURGERY

## 2020-11-05 PROCEDURE — G8419 CALC BMI OUT NRM PARAM NOF/U: HCPCS | Performed by: ORTHOPAEDIC SURGERY

## 2020-11-05 PROCEDURE — G8754 DIAS BP LESS 90: HCPCS | Performed by: ORTHOPAEDIC SURGERY

## 2020-11-05 PROCEDURE — 1100F PTFALLS ASSESS-DOCD GE2>/YR: CPT | Performed by: ORTHOPAEDIC SURGERY

## 2020-11-05 PROCEDURE — G9899 SCRN MAM PERF RSLTS DOC: HCPCS | Performed by: ORTHOPAEDIC SURGERY

## 2020-11-05 PROCEDURE — G8752 SYS BP LESS 140: HCPCS | Performed by: ORTHOPAEDIC SURGERY

## 2020-11-05 PROCEDURE — G8427 DOCREV CUR MEDS BY ELIG CLIN: HCPCS | Performed by: ORTHOPAEDIC SURGERY

## 2020-11-05 PROCEDURE — 1090F PRES/ABSN URINE INCON ASSESS: CPT | Performed by: ORTHOPAEDIC SURGERY

## 2020-11-05 NOTE — PROGRESS NOTES
Patient: Channie Nissen                MRN: [id number]       SSN: [id number]  YOB: 1955        AGE: 72 y.o. SEX: female  Body mass index is 27.62 kg/m². PCP: Michelle Gamez MD  11/05/20    HISTORY:  I had the pleasure of reviewing the Donna Alanis family in follow up of right hip pain. She gets a little bit of lateral pain, groin pain, difficulty putting shoes and socks on. She has interestingly enough been wearing a knee brace, which really does not help, and I think she had used her cane in the left hand we gave her at the last visit when it is more symptomatic. The pain is moderate, aching, usually non radicular. Difficulty putting shoes and socks on, rolling over on it at night can be painful, and she has had some bursitis of the hip in the past.  I sent her for an MRI test to rule out AVN. MRI review appears to be negative for AVN. I still think she has a component of it despite the radiologist's review. Plain x-rays confirm end staged arthritis as well. She has partial abductor tears as well bilaterally, a little worse on the left than on the right. Clinically on x-ray it appears she has osteoporosis and I would like her to have a bone density test.  It has been a while since she has had one. PLAN:  We did discuss risks and benefits involving hip replacement surgery, lateral versus direct anterior. I would favor direct anterior for her, assuming that the bone quality is good, and we talked about numbness, infection, DVT and other complications, leg length discrepancy. She is going to think about things. I would gently recommend she put her name on the list for surgery as we are a little backed up due to the [place]. Having said this, we will get a bone density test and it will be really up to her as to the timing of surgery.   I have explained that Dr. Carri Bridges does bone density tests in the office so she can have it done there or in the hospital, it is really up to her.  I am very appreciative of Dr. Maribell Choi input. We also talked about biologics and DMARDs and their role with her arthritis and timing of surgery as well. C:   MD Sonido Falk MD        REVIEW OF SYSTEMS:      CON: negative  EYE: negative   ENT: negative  RESP: negative  GI:    negative   :  negative  MSK: Positive  A twelve point review of systems was completed, positives noted and all other systems were reviewed and are negative          Past Medical History:   Diagnosis Date    Arthritis     Rheumatoid arthritis    Ganglion 10/2019    Glaucoma     Hypertension     Osteoporosis        Family History   Problem Relation Age of Onset    Heart Disease Mother     Arthritis-osteo Father     Liver Disease Father     Diabetes Sister     Cancer Maternal Aunt     Cancer Maternal Grandmother        Current Outpatient Medications   Medication Sig Dispense Refill    OTHER Cizima for rheumatoid arthritis. Injection (2 injections) at each visit      varicella-zoster recombinant, PF, (Shingrix, PF,) 50 mcg/0.5 mL susr injection 0.5mL by IntraMUSCular route once now and then repeat in 2-6 months 0.5 mL 1    calcium carbonate (Calcium 500) 500 mg calcium (1,250 mg) chewable tablet Take 1 Tab by mouth daily. 90 Tab 3    amLODIPine (NORVASC) 5 mg tablet Take 5 mg by mouth daily.  dorzolamide-timolol, PF, (COSOPT) 2-0.5 % ophthalmic solution Administer 1 Drop to both eyes two (2) times a day.  latanoprost (XALATAN) 0.005 % ophthalmic solution Administer 1 Drop to both eyes nightly.  lisinopril-hydroCHLOROthiazide (PRINZIDE, ZESTORETIC) 20-12.5 mg per tablet Take  by mouth daily.  multivitamin, tx-iron-ca-min (THERA-M w/ IRON) 9 mg iron-400 mcg tab tablet Take 1 Tab by mouth daily.  cholecalciferol (Vitamin D3) 25 mcg (1,000 unit) cap Take  by mouth daily.  alendronate (Fosamax) 70 mg tablet Take  by mouth.       folic acid (FOLVITE) 1 mg tablet Take  by mouth daily.          No Known Allergies    Past Surgical History:   Procedure Laterality Date    HX ANKLE FRACTURE TX Right 09/30/2020    HX CARPAL TUNNEL RELEASE Left 2017    HX CARPAL TUNNEL RELEASE Right 2016    HX CHOLECYSTECTOMY  1993       Social History     Socioeconomic History    Marital status:      Spouse name: Not on file    Number of children: Not on file    Years of education: Not on file    Highest education level: Not on file   Occupational History    Not on file   Social Needs    Financial resource strain: Not on file    Food insecurity     Worry: Not on file     Inability: Not on file    Transportation needs     Medical: Not on file     Non-medical: Not on file   Tobacco Use    Smoking status: Never Smoker    Smokeless tobacco: Never Used   Substance and Sexual Activity    Alcohol use: Never     Frequency: Never    Drug use: Never    Sexual activity: Not on file   Lifestyle    Physical activity     Days per week: Not on file     Minutes per session: Not on file    Stress: Not on file   Relationships    Social connections     Talks on phone: Not on file     Gets together: Not on file     Attends Anabaptist service: Not on file     Active member of club or organization: Not on file     Attends meetings of clubs or organizations: Not on file     Relationship status: Not on file    Intimate partner violence     Fear of current or ex partner: Not on file     Emotionally abused: Not on file     Physically abused: Not on file     Forced sexual activity: Not on file   Other Topics Concern    Not on file   Social History Narrative    Not on file       Visit Vitals  /73 (BP 1 Location: Right arm, BP Patient Position: Sitting)   Pulse 94   Temp 97.3 °F (36.3 °C)   Resp 16   Ht 5' 5\" (1.651 m)   Wt 75.3 kg (166 lb)   SpO2 98%   BMI 27.62 kg/m²         PHYSICAL EXAMINATION:  GENERAL: Alert and oriented x3, in no acute distress, well-developed, well-nourished, afebrile. HEART: No JVD. EYES: No scleral icterus   NECK: No significant lymphadenopathy   LUNGS: No respiratory compromise or indrawing  ABDOMEN: Soft, non-tender, non-distended. Electronically signed by:  Bharati Davenport MD

## 2020-11-09 DIAGNOSIS — Z78.0 POST-MENOPAUSAL: ICD-10-CM

## 2020-11-09 DIAGNOSIS — M85.80 OSTEOPENIA DETERMINED BY X-RAY: ICD-10-CM

## 2020-11-26 DIAGNOSIS — Z12.11 SCREENING FOR COLON CANCER: Primary | ICD-10-CM

## 2020-12-30 DIAGNOSIS — I10 ESSENTIAL (PRIMARY) HYPERTENSION: ICD-10-CM

## 2020-12-30 RX ORDER — LISINOPRIL AND HYDROCHLOROTHIAZIDE 12.5; 2 MG/1; MG/1
TABLET ORAL
Qty: 90 TAB | Refills: 3 | Status: SHIPPED | OUTPATIENT
Start: 2020-12-30 | End: 2021-10-07 | Stop reason: SDUPTHER

## 2020-12-30 NOTE — TELEPHONE ENCOUNTER
Last seen 10/13/20  Next appt  None    Requested Prescriptions     Pending Prescriptions Disp Refills    lisinopril-hydroCHLOROthiazide (PRINZIDE, ZESTORETIC) 20-12.5 mg per tablet 90 Tab 3

## 2021-02-14 DIAGNOSIS — Z12.31 ENCOUNTER FOR SCREENING MAMMOGRAM FOR MALIGNANT NEOPLASM OF BREAST: Primary | ICD-10-CM

## 2021-03-25 ENCOUNTER — TELEPHONE (OUTPATIENT)
Dept: INTERNAL MEDICINE CLINIC | Age: 66
End: 2021-03-25

## 2021-04-09 ENCOUNTER — HOSPITAL ENCOUNTER (OUTPATIENT)
Dept: LAB | Age: 66
Discharge: HOME | End: 2021-04-09
Payer: MEDICARE

## 2021-04-09 ENCOUNTER — OFFICE VISIT (OUTPATIENT)
Dept: INTERNAL MEDICINE CLINIC | Age: 66
End: 2021-04-09
Payer: MEDICARE

## 2021-04-09 VITALS
WEIGHT: 171.6 LBS | HEIGHT: 65 IN | TEMPERATURE: 97.7 F | BODY MASS INDEX: 28.59 KG/M2 | OXYGEN SATURATION: 98 % | HEART RATE: 90 BPM | RESPIRATION RATE: 12 BRPM | DIASTOLIC BLOOD PRESSURE: 80 MMHG | SYSTOLIC BLOOD PRESSURE: 132 MMHG

## 2021-04-09 DIAGNOSIS — I10 ESSENTIAL HYPERTENSION: ICD-10-CM

## 2021-04-09 DIAGNOSIS — R79.89 ELEVATED LIVER FUNCTION TESTS: ICD-10-CM

## 2021-04-09 DIAGNOSIS — M06.9 RHEUMATOID ARTHRITIS, INVOLVING UNSPECIFIED SITE, UNSPECIFIED WHETHER RHEUMATOID FACTOR PRESENT (HCC): ICD-10-CM

## 2021-04-09 DIAGNOSIS — R74.01 ELEVATED ALT MEASUREMENT: ICD-10-CM

## 2021-04-09 DIAGNOSIS — I10 ESSENTIAL HYPERTENSION: Primary | ICD-10-CM

## 2021-04-09 LAB
ALBUMIN SERPL-MCNC: 4.1 G/DL (ref 3.4–5)
ALBUMIN/GLOB SERPL: 1 {RATIO} (ref 0.8–1.7)
ALP SERPL-CCNC: 91 U/L (ref 45–117)
ALT SERPL-CCNC: 107 U/L (ref 13–56)
ANION GAP SERPL CALC-SCNC: 11 MMOL/L (ref 3–18)
AST SERPL-CCNC: 42 U/L (ref 10–38)
BILIRUB SERPL-MCNC: 0.8 MG/DL (ref 0.2–1)
BUN SERPL-MCNC: 12 MG/DL (ref 7–18)
BUN/CREAT SERPL: 21 (ref 12–20)
CALCIUM SERPL-MCNC: 10.2 MG/DL (ref 8.5–10.1)
CHLORIDE SERPL-SCNC: 103 MMOL/L (ref 100–111)
CHOLEST SERPL-MCNC: 196 MG/DL
CO2 SERPL-SCNC: 26 MMOL/L (ref 21–32)
CREAT SERPL-MCNC: 0.57 MG/DL (ref 0.6–1.3)
GLOBULIN SER CALC-MCNC: 4.1 G/DL (ref 2–4)
GLUCOSE SERPL-MCNC: 115 MG/DL (ref 74–99)
HDLC SERPL-MCNC: 45 MG/DL (ref 40–60)
HDLC SERPL: 4.4 {RATIO} (ref 0–5)
IRON SATN MFR SERPL: 36 % (ref 20–50)
IRON SERPL-MCNC: 130 UG/DL (ref 50–175)
LDLC SERPL CALC-MCNC: 99.2 MG/DL (ref 0–100)
LIPID PROFILE,FLP: ABNORMAL
POTASSIUM SERPL-SCNC: 4.6 MMOL/L (ref 3.5–5.5)
PROT SERPL-MCNC: 8.2 G/DL (ref 6.4–8.2)
SODIUM SERPL-SCNC: 140 MMOL/L (ref 136–145)
TIBC SERPL-MCNC: 357 UG/DL (ref 250–450)
TRIGL SERPL-MCNC: 259 MG/DL (ref ?–150)
VLDLC SERPL CALC-MCNC: 51.8 MG/DL

## 2021-04-09 PROCEDURE — 80061 LIPID PANEL: CPT

## 2021-04-09 PROCEDURE — 99204 OFFICE O/P NEW MOD 45 MIN: CPT | Performed by: INTERNAL MEDICINE

## 2021-04-09 PROCEDURE — 80053 COMPREHEN METABOLIC PANEL: CPT

## 2021-04-09 PROCEDURE — 87340 HEPATITIS B SURFACE AG IA: CPT

## 2021-04-09 PROCEDURE — G0463 HOSPITAL OUTPT CLINIC VISIT: HCPCS | Performed by: INTERNAL MEDICINE

## 2021-04-09 PROCEDURE — 83540 ASSAY OF IRON: CPT

## 2021-04-09 PROCEDURE — 86803 HEPATITIS C AB TEST: CPT

## 2021-04-09 RX ORDER — AMLODIPINE BESYLATE 5 MG/1
5 TABLET ORAL DAILY
Qty: 90 TAB | Refills: 3 | Status: SHIPPED | OUTPATIENT
Start: 2021-04-09 | End: 2021-10-07 | Stop reason: SDUPTHER

## 2021-04-09 NOTE — PROGRESS NOTES
1. Have you been to the ER, urgent care clinic or hospitalized since your last visit? NO           2. Have you seen or consulted any other health care providers outside of the [address] [place] [address] since your last visit (Include any pap smears or colon screening)? NO    Do you have an Advanced Directive? YES    Would you like information on Advanced Directives?  NO

## 2021-04-11 NOTE — PROGRESS NOTES
HPI     Argentina Vo is a 77 y.o. female establishing new primary care. Moved here from [place] last year, mother was ill and passed away 3 months ago. Has RA, saw rheum a few weeks ago,w as told  An \"enzyme\" (LFT?) was elevated. Was without Cimzia for a few months, RA better back on now. Denies family hx of cryptogenic cirrhosis. (+) osteoporosis, on Fosamax, last DEXA 2020. Last PAP 2019, all Paps have been normal per her report. Retire in 2017. R hip pain, will see Dave Perdomo for. States ocular pressure oK at last ophtho visit, she is unsure if carries dx of glaucoma or just elevate pressures. Ganglion cyst of R dorsum of hand is returning after being drained in past.        Past Medical History:   Diagnosis Date    Arthritis     Rheumatoid arthritis    Ganglion 10/2019    Glaucoma     Hypertension     Osteoporosis     Rheumatoid arthritis ([facility] [address]     Dr. Simi Cooper. Cimzia infusions monthly        Past Surgical History:   Procedure Laterality Date    HX ANKLE FRACTURE TX Right 09/30/2020    HX CARPAL TUNNEL RELEASE Left 2017    HX CARPAL TUNNEL RELEASE Right 2016    HX CHOLECYSTECTOMY  1993        Current Outpatient Medications   Medication Sig Dispense Refill    amLODIPine (NORVASC) 5 mg tablet Take 1 Tab by mouth daily. 90 Tab 3    lisinopril-hydroCHLOROthiazide (PRINZIDE, ZESTORETIC) 20-12.5 mg per tablet TAKE 1 TABLET BY MOUTH EVERY DAY 90 Tab 3    OTHER Cizima for rheumatoid arthritis. Injection (2 injections) at each visit      dorzolamide-timolol, PF, (COSOPT) 2-0.5 % ophthalmic solution Administer 1 Drop to both eyes two (2) times a day.  latanoprost (XALATAN) 0.005 % ophthalmic solution Administer 1 Drop to both eyes nightly.  multivitamin, tx-iron-ca-min (THERA-M w/ IRON) 9 mg iron-400 mcg tab tablet Take 1 Tab by mouth daily.  cholecalciferol (Vitamin D3) 25 mcg (1,000 unit) cap Take  by mouth daily.  alendronate (Fosamax) 70 mg tablet Take  by mouth.       varicella-zoster recombinant, PF, (Shingrix, PF,) 50 mcg/0.5 mL susr injection 0.5mL by IntraMUSCular route once now and then repeat in 2-6 months 0.5 mL 1        No Known Allergies     Social History     Socioeconomic History    Marital status:      Spouse name: Not on file    Number of children: Not on file    Years of education: Not on file    Highest education level: Not on file   Occupational History    Not on file   Social Needs    Financial resource strain: Not on file    Food insecurity     Worry: Not on file     Inability: Not on file    Transportation needs     Medical: Not on file     Non-medical: Not on file   Tobacco Use    Smoking status: Never Smoker    Smokeless tobacco: Never Used   Substance and Sexual Activity    Alcohol use: Never     Frequency: Never    Drug use: Never    Sexual activity: Not on file   Lifestyle    Physical activity     Days per week: Not on file     Minutes per session: Not on file    Stress: Not on file   Relationships    Social connections     Talks on phone: Not on file     Gets together: Not on file     Attends Hinduism service: Not on file     Active member of club or organization: Not on file     Attends meetings of clubs or organizations: Not on file     Relationship status: Not on file    Intimate partner violence     Fear of current or ex partner: Not on file     Emotionally abused: Not on file     Physically abused: Not on file     Forced sexual activity: Not on file   Other Topics Concern    Not on file   Social History Narrative    Not on file        Family History   Problem Relation Age of Onset    Heart Disease Mother     Arthritis-osteo Father     Liver Disease Father     Diabetes Sister     Cancer Maternal Aunt     Cancer Maternal Grandmother            Visit Vitals  /80 (BP 1 Location: Right upper arm, BP Patient Position: Sitting)   Pulse 90   Temp 97.7 °F (36.5 °C) (Oral)   Resp 12   Ht 5' 5\" (1.651 m)   Wt 171 lb 9.6 oz (77.8 kg)   SpO2 98%   BMI 28.56 kg/m²        Review of Systems   Constitutional: Negative for fever. Eyes: Negative for blurred vision. Respiratory: Negative for shortness of breath. Cardiovascular: Negative for chest pain. Gastrointestinal: Negative for blood in stool. Genitourinary: Negative for hematuria. No vaginal bleeding. Musculoskeletal: Positive for joint pain. Neurological: Negative for headaches. Psychiatric/Behavioral: Negative for depression. Physical Exam  Vitals signs reviewed. Constitutional:       General: She is not in acute distress. Eyes:      General: No scleral icterus. Conjunctiva/sclera: Conjunctivae normal.   Neck:      Musculoskeletal: Neck supple. Cardiovascular:      Rate and Rhythm: Normal rate and regular rhythm. Pulses: Normal pulses. Heart sounds: No murmur. No friction rub. No gallop. Pulmonary:      Effort: Pulmonary effort is normal.      Breath sounds: Normal breath sounds. Abdominal:      General: Abdomen is flat. Palpations: Abdomen is soft. There is no mass. Tenderness: There is no abdominal tenderness. Musculoskeletal:      Comments: No clubbing, cyanosis, or edema   Skin:     General: Skin is warm and dry. Neurological:      Mental Status: She is alert and oriented to person, place, and time. Psychiatric:         Mood and Affect: Mood normal.                  Diagnoses and all orders for this visit:    1. Essential hypertension  Comments:  controlled, continue med, refilled. Pertinent fasting labs done today. Orders:  -     LIPID PANEL; Future  -     METABOLIC PANEL, COMPREHENSIVE; Future    2. Rheumatoid arthritis, involving unspecified site, unspecified whether rheumatoid factor present ([facility] [address]  Comments:  Flared when was off Cimzia, currently controlled-- I have requested recent lab reports from her rheumatolgist    3. Elevated ALT measurement  Comments:  on rheum labs a few weeks ago.   Recheck ALT, and, after discusison, hepatitis serologies, iron studies  Orders:  -     HEP B SURFACE AG; Future  -     HEPATITIS C AB; Future  -     ALT; Future    4. Elevated liver function tests  Comments:  check LFTs, hep C ab, hep BSAg, irons  Orders:  -     HEP B SURFACE AG; Future    Other orders  -     amLODIPine (NORVASC) 5 mg tablet; Take 1 Tab by mouth daily. Follow-up and Dispositions    · Return in about 6 months (around 10/9/2021) for 30 minute-- [employer].               Dwain Osborn MD

## 2021-04-12 LAB
HBV SURFACE AG SER QL: <0.1 INDEX
HBV SURFACE AG SER QL: NEGATIVE
HCV AB SER IA-ACNC: 0.06 INDEX
HCV AB SERPL QL IA: NEGATIVE
HCV COMMENT,HCGAC: NORMAL

## 2021-04-15 ENCOUNTER — TELEPHONE (OUTPATIENT)
Dept: INTERNAL MEDICINE CLINIC | Age: 66
End: 2021-04-15

## 2021-04-15 DIAGNOSIS — R79.89 ELEVATED LIVER FUNCTION TESTS: Primary | ICD-10-CM

## 2021-04-16 ENCOUNTER — HOSPITAL ENCOUNTER (OUTPATIENT)
Dept: LAB | Age: 66
Discharge: HOME | End: 2021-04-16
Payer: MEDICARE

## 2021-04-16 ENCOUNTER — APPOINTMENT (OUTPATIENT)
Dept: INTERNAL MEDICINE CLINIC | Age: 66
End: 2021-04-16

## 2021-04-16 DIAGNOSIS — R79.89 ELEVATED LIVER FUNCTION TESTS: Primary | ICD-10-CM

## 2021-04-16 DIAGNOSIS — R79.89 ELEVATED LIVER FUNCTION TESTS: ICD-10-CM

## 2021-04-16 LAB
ALBUMIN SERPL-MCNC: 3.6 G/DL (ref 3.4–5)
ALBUMIN/GLOB SERPL: 0.9 {RATIO} (ref 0.8–1.7)
ALP SERPL-CCNC: 84 U/L (ref 45–117)
ALT SERPL-CCNC: 51 U/L (ref 13–56)
AST SERPL-CCNC: 30 U/L (ref 10–38)
BILIRUB DIRECT SERPL-MCNC: 0.1 MG/DL (ref 0–0.2)
BILIRUB SERPL-MCNC: 0.3 MG/DL (ref 0.2–1)
GLOBULIN SER CALC-MCNC: 4 G/DL (ref 2–4)
PROT SERPL-MCNC: 7.6 G/DL (ref 6.4–8.2)

## 2021-04-16 PROCEDURE — 83516 IMMUNOASSAY NONANTIBODY: CPT

## 2021-04-16 PROCEDURE — 82390 ASSAY OF CERULOPLASMIN: CPT

## 2021-04-16 PROCEDURE — 86235 NUCLEAR ANTIGEN ANTIBODY: CPT

## 2021-04-16 PROCEDURE — 36415 COLL VENOUS BLD VENIPUNCTURE: CPT

## 2021-04-16 PROCEDURE — 80076 HEPATIC FUNCTION PANEL: CPT

## 2021-04-17 LAB
CENTROMERE B AB SER-ACNC: >8 AI (ref 0–0.9)
CHROMATIN AB SERPL-ACNC: 0.4 AI (ref 0–0.9)
DSDNA AB SER-ACNC: <1 IU/ML (ref 0–9)
ENA JO1 AB SER-ACNC: <0.2 AI (ref 0–0.9)
ENA RNP AB SER-ACNC: 0.6 AI (ref 0–0.9)
ENA SCL70 AB SER-ACNC: <0.2 AI (ref 0–0.9)
ENA SM AB SER-ACNC: <0.2 AI (ref 0–0.9)
ENA SS-A AB SER-ACNC: 0.2 AI (ref 0–0.9)
ENA SS-B AB SER-ACNC: <0.2 AI (ref 0–0.9)
SEE BELOW, 164869: ABNORMAL

## 2021-04-18 LAB — CERULOPLASMIN SERPL-MCNC: 22.7 MG/DL (ref 19–39)

## 2021-04-20 LAB
ACTIN IGG SERPL-ACNC: 20 UNITS (ref 0–19)
MITOCHONDRIA M2 IGG SER-ACNC: <20 UNITS (ref 0–20)

## 2021-04-22 DIAGNOSIS — R79.89 ELEVATED LIVER FUNCTION TESTS: ICD-10-CM

## 2021-04-30 ENCOUNTER — TELEPHONE (OUTPATIENT)
Dept: INTERNAL MEDICINE CLINIC | Age: 66
End: 2021-04-30

## 2021-04-30 NOTE — TELEPHONE ENCOUNTER
----- Message from Maynor Campbell sent at 4/29/2021  3:53 PM EDT -----  Patient needs to Return in about 6 months (around 10/9/2021) for 30 minute-- [employer]

## 2021-04-30 NOTE — TELEPHONE ENCOUNTER
Patient refused to schedule this appt. Stating Dr. Cece Martinez was supposed to speak with her rheumatologist and get back with her about whether or not she needs to have the ultrasound done.

## 2021-06-07 ENCOUNTER — HOSPITAL ENCOUNTER (OUTPATIENT)
Dept: GENERAL RADIOLOGY | Age: 66
Discharge: HOME | End: 2021-06-07
Payer: MEDICARE

## 2021-06-07 DIAGNOSIS — M06.09 RHEUMATOID ARTHRITIS WITHOUT RHEUMATOID FACTOR, MULTIPLE SITES (HCC): ICD-10-CM

## 2021-06-07 PROCEDURE — 71046 X-RAY EXAM CHEST 2 VIEWS: CPT

## 2021-10-07 ENCOUNTER — OFFICE VISIT (OUTPATIENT)
Dept: INTERNAL MEDICINE CLINIC | Age: 66
End: 2021-10-07
Payer: MEDICARE

## 2021-10-07 VITALS
OXYGEN SATURATION: 100 % | WEIGHT: 172 LBS | HEART RATE: 97 BPM | HEIGHT: 65 IN | TEMPERATURE: 97.3 F | DIASTOLIC BLOOD PRESSURE: 80 MMHG | SYSTOLIC BLOOD PRESSURE: 128 MMHG | BODY MASS INDEX: 28.66 KG/M2

## 2021-10-07 DIAGNOSIS — Z23 NEEDS FLU SHOT: ICD-10-CM

## 2021-10-07 DIAGNOSIS — K11.1 ENLARGED PAROTID GLAND: ICD-10-CM

## 2021-10-07 DIAGNOSIS — I10 ESSENTIAL (PRIMARY) HYPERTENSION: ICD-10-CM

## 2021-10-07 DIAGNOSIS — Z00.00 MEDICARE ANNUAL WELLNESS VISIT, SUBSEQUENT: Primary | ICD-10-CM

## 2021-10-07 DIAGNOSIS — Z71.89 ADVANCED DIRECTIVES, COUNSELING/DISCUSSION: ICD-10-CM

## 2021-10-07 PROCEDURE — 99213 OFFICE O/P EST LOW 20 MIN: CPT | Performed by: INTERNAL MEDICINE

## 2021-10-07 PROCEDURE — G9899 SCRN MAM PERF RSLTS DOC: HCPCS | Performed by: INTERNAL MEDICINE

## 2021-10-07 PROCEDURE — 3288F FALL RISK ASSESSMENT DOCD: CPT | Performed by: INTERNAL MEDICINE

## 2021-10-07 PROCEDURE — 99497 ADVNCD CARE PLAN 30 MIN: CPT | Performed by: INTERNAL MEDICINE

## 2021-10-07 PROCEDURE — 1100F PTFALLS ASSESS-DOCD GE2>/YR: CPT | Performed by: INTERNAL MEDICINE

## 2021-10-07 PROCEDURE — 90694 VACC AIIV4 NO PRSRV 0.5ML IM: CPT | Performed by: INTERNAL MEDICINE

## 2021-10-07 PROCEDURE — 3017F COLORECTAL CA SCREEN DOC REV: CPT | Performed by: INTERNAL MEDICINE

## 2021-10-07 PROCEDURE — G0463 HOSPITAL OUTPT CLINIC VISIT: HCPCS | Performed by: INTERNAL MEDICINE

## 2021-10-07 PROCEDURE — G8754 DIAS BP LESS 90: HCPCS | Performed by: INTERNAL MEDICINE

## 2021-10-07 PROCEDURE — G8536 NO DOC ELDER MAL SCRN: HCPCS | Performed by: INTERNAL MEDICINE

## 2021-10-07 PROCEDURE — G0439 PPPS, SUBSEQ VISIT: HCPCS | Performed by: INTERNAL MEDICINE

## 2021-10-07 PROCEDURE — G8419 CALC BMI OUT NRM PARAM NOF/U: HCPCS | Performed by: INTERNAL MEDICINE

## 2021-10-07 PROCEDURE — 1090F PRES/ABSN URINE INCON ASSESS: CPT | Performed by: INTERNAL MEDICINE

## 2021-10-07 PROCEDURE — G8752 SYS BP LESS 140: HCPCS | Performed by: INTERNAL MEDICINE

## 2021-10-07 PROCEDURE — G8432 DEP SCR NOT DOC, RNG: HCPCS | Performed by: INTERNAL MEDICINE

## 2021-10-07 PROCEDURE — G8427 DOCREV CUR MEDS BY ELIG CLIN: HCPCS | Performed by: INTERNAL MEDICINE

## 2021-10-07 RX ORDER — ABATACEPT 125 MG/ML
INJECTION, SOLUTION SUBCUTANEOUS
COMMUNITY
Start: 2021-07-14

## 2021-10-07 RX ORDER — LISINOPRIL AND HYDROCHLOROTHIAZIDE 12.5; 2 MG/1; MG/1
TABLET ORAL
Qty: 90 TABLET | Refills: 3 | Status: SHIPPED | OUTPATIENT
Start: 2021-10-07 | End: 2022-10-11 | Stop reason: SDUPTHER

## 2021-10-07 RX ORDER — DORZOLAMIDE HYDROCHLORIDE AND TIMOLOL MALEATE 20; 5 MG/ML; MG/ML
SOLUTION/ DROPS OPHTHALMIC
COMMUNITY

## 2021-10-07 RX ORDER — AMLODIPINE BESYLATE 5 MG/1
5 TABLET ORAL DAILY
Qty: 90 TABLET | Refills: 3 | Status: SHIPPED | OUTPATIENT
Start: 2021-10-07 | End: 2022-10-11 | Stop reason: SDUPTHER

## 2021-10-07 NOTE — ACP (ADVANCE CARE PLANNING)
Advance Care Planning     Advance Care Planning (ACP) Physician/NP/PA Conversation      Date of Conversation: 10/7/2021  Conducted with: Patient with Decision Making Capacity    Healthcare Decision Maker:   No healthcare decision makers have been documented. Click here to complete [address] including selection of the Healthcare Decision Maker Relationship (ie \"Primary\")      Today we documented Decision Maker(s) consistent with Legal Next of Kin hierarchy. Care Preferences:    Hospitalization: \"If your health worsens and it becomes clear that your chance of recovery is unlikely, what would be your preference regarding hospitalization? \"  The patient would prefer hospitalization. Ventilation: \"If you were unable to breathe on your own and your chance of recovery was unlikely, what would be your preference about the use of a ventilator (breathing machine) if it was available to you? \"   The patient is unsure. Resuscitation: \"In the event your heart stopped as a result of an underlying serious health condition, would you want attempts to be made to restart your heart, or would you prefer a natural death? \"   The patient is unsure.     Additional topics discussed: artificial nutrition, ventilation preferences, hospitalization preferences and resuscitation preferences      Conversation Outcomes / Follow-Up Plan:   ACP in process - information provided, considering goals and options  Reviewed DNR/DNI and patient elects Full Code (Attempt Resuscitation)     Length of Voluntary ACP Conversation in minutes:  16 minutes    Ruth Paris MD

## 2021-10-07 NOTE — PROGRESS NOTES
\[facility] [place] [facility]\""     Tricia Taylor is here for her annual Medicare wellness and advance care planning visit. When she checks her blood pressure at home, the systolic is usually in the 120s. It is always initially elevated at [address], improved on recheck. She is compliant with her meds. She notes several weeks of painless bilateral parotid gland enlargement. She has not had any fever, and she received her childhood mumps vaccine. She denies particularly dry eyes or dry mouth. She has some skin lesions which itch and look a little bit like arthropod bites, and she points out what I thought to be rheumatoid nodules. Past Medical History:   Diagnosis Date    Arthritis     Rheumatoid arthritis    Ganglion 10/2019    Glaucoma     Hypertension     Osteoporosis     Rheumatoid arthritis ([facility] [address]     Dr. González Cooley. Cimzia infusions monthly        Past Surgical History:   Procedure Laterality Date    HX ANKLE FRACTURE TX Right 09/30/2020    HX CARPAL TUNNEL RELEASE Left 2017    HX CARPAL TUNNEL RELEASE Right 2016    HX CHOLECYSTECTOMY  1993        Current Outpatient Medications   Medication Sig Dispense Refill    abatacept (Orencia ClickJect) 944 mg/mL atIn 1 ml      dorzolamide-timoloL (COSOPT) 22.3-6.8 mg/mL ophthalmic solution 1 drop into affected eye      lisinopril-hydroCHLOROthiazide (PRINZIDE, ZESTORETIC) 20-12.5 mg per tablet TAKE 1 TABLET BY MOUTH EVERY DAY 90 Tablet 3    amLODIPine (NORVASC) 5 mg tablet Take 1 Tablet by mouth daily. 90 Tablet 3    latanoprost (XALATAN) 0.005 % ophthalmic solution Administer 1 Drop to both eyes nightly.  multivitamin, tx-iron-ca-min (THERA-M w/ IRON) 9 mg iron-400 mcg tab tablet Take 1 Tab by mouth daily.  cholecalciferol (Vitamin D3) 25 mcg (1,000 unit) cap Take  by mouth daily.  alendronate (Fosamax) 70 mg tablet Take  by mouth.       varicella-zoster recombinant, PF, (Shingrix, PF,) 50 mcg/0.5 mL susr injection 0.5mL by IntraMUSCular route once now and then repeat in 2-6 months 0.5 mL 1        No Known Allergies     Social History     Socioeconomic History    Marital status:      Spouse name: Not on file    Number of children: Not on file    Years of education: Not on file    Highest education level: Not on file   Occupational History    Not on file   Tobacco Use    Smoking status: Former Smoker     Packs/day: 0.50     Years: 30.00     Pack years: 15.00     Start date:      Quit date:      Years since quittin.7    Smokeless tobacco: Never Used   Substance and Sexual Activity    Alcohol use: Never     Comment: 3 or 4 weekly    Drug use: Never    Sexual activity: Not on file   Other Topics Concern    Not on file   Social History Narrative    Not on file     Social Determinants of Health     Financial Resource Strain:     Difficulty of Paying Living Expenses:    Food Insecurity:     Worried About [address] in the Last Year:     [address] [employer] in the Last Year:    Transportation Needs:     Lack of Transportation (Medical):      Lack of Transportation (Non-Medical):    Physical Activity:     Days of Exercise per Week:     Minutes of Exercise per Session:    Stress:     Feeling of Stress :    Social Connections:     Frequency of Communication with Friends and Family:     Frequency of Social Gatherings with Friends and Family:     Attends Yarsanism Services:     Active Member of Clubs or Organizations:     Attends Club or Organization Meetings:     Marital Status:    Intimate Partner Violence:     Fear of Current or Ex-Partner:     Emotionally Abused:     Physically Abused:     Sexually Abused:         Family History   Problem Relation Age of Onset    Heart Disease Mother     Arthritis-osteo Father     Liver Disease Father     Diabetes Sister     Cancer Maternal Aunt     Cancer Maternal Grandmother     Ovarian Cancer Maternal Aunt     Cancer Maternal Aunt         lung and colon           Visit Vitals  /80 (BP 1 Location: Left upper arm, BP Patient Position: Sitting, BP Cuff Size: Large adult)   Pulse 97   Temp 97.3 °F (36.3 °C) (Skin)   Ht 5' 5\" (1.651 m)   Wt 172 lb (78 kg)   SpO2 100%   BMI 28.62 kg/m²        Review of Systems   Constitutional: Negative for chills and fever. Eyes: Negative for blurred vision. Respiratory: Negative for shortness of breath. Cardiovascular: Negative for chest pain. Gastrointestinal: Negative for blood in stool. Genitourinary: Negative for hematuria. No vaginal bleeding. Neurological: Negative for headaches. Psychiatric/Behavioral: Negative for depression. The patient is not nervous/anxious. Physical Exam  Constitutional:       General: She is not in acute distress. Appearance: She is not ill-appearing. HENT:      Head:      Comments: Bilateral nontender parotid enlargement. Eyes:      General: No scleral icterus. Conjunctiva/sclera: Conjunctivae normal.   Cardiovascular:      Rate and Rhythm: Normal rate and regular rhythm. Pulses: Normal pulses. Heart sounds: No murmur heard. No friction rub. No gallop. Pulmonary:      Effort: Pulmonary effort is normal.      Breath sounds: Normal breath sounds. Abdominal:      General: Abdomen is flat. Palpations: Abdomen is soft. Tenderness: There is no abdominal tenderness. Musculoskeletal:      Cervical back: Neck supple. Comments: No clubbing, cyanosis, or edema. Skin:     General: Skin is warm and dry. Neurological:      Mental Status: She is alert and oriented to person, place, and time. Psychiatric:         Mood and Affect: Mood normal.                  Diagnoses and all orders for this visit:    1. Medicare annual wellness visit, subsequent  -     ADVANCE CARE PLANNING FIRST 30 MINS    2. Advanced directives, counseling/discussion  -     ADVANCE CARE PLANNING FIRST 30 MINS    3.  Enlarged parotid gland  Comments:  Discussed with rheumatologist, who recommends ENT referral to rule out Sjogren's overlap or lymphoma due to immunosuppressive. Orders:  -     REFERRAL TO ENT-OTOLARYNGOLOGY    4. Essential (primary) hypertension  Comments:  Controlled on recheck, blood pressure meds refilled. Orders:  -     lisinopril-hydroCHLOROthiazide (PRINZIDE, ZESTORETIC) 20-12.5 mg per tablet; TAKE 1 TABLET BY MOUTH EVERY DAY  -     amLODIPine (NORVASC) 5 mg tablet; Take 1 Tablet by mouth daily. 5. Needs flu shot  Comments:  High-dose flu vaccine given at visit. Orders:  -     FLU (FLUAD QUAD INFLUENZA VACCINE,QUAD,ADJUVANTED)         Follow-up and Dispositions    · Return in about 6 months (around 4/7/2022) for 15 minutes-- physical.              Herminio Vela MD   This is the Subsequent Medicare Annual Wellness Exam, performed 12 months or more after the Initial AWV or the last Subsequent AWV    I have reviewed the patient's medical history in detail and updated the computerized patient record. Assessment/Plan   Education and counseling provided:  Are appropriate based on today's review and evaluation    1. Advanced directives, counseling/discussion  -     ADVANCE CARE PLANNING FIRST 30 MINS  2. Medicare annual wellness visit, subsequent  -     ADVANCE CARE PLANNING FIRST 30 MINS       Depression Risk Factor Screening     3 most recent PHQ Screens 4/9/2021   Little interest or pleasure in doing things Not at all   Feeling down, depressed, irritable, or hopeless Not at all   Total Score PHQ 2 0       Alcohol Risk Screen    Do you average more than 1 drink per night or more than 7 drinks a week:  No    On any one occasion in the past three months have you have had more than 3 drinks containing alcohol:  No        Functional Ability and Level of Safety    Hearing: Hearing is good. Activities of Daily Living:   The home contains: grab bars  Patient does total self care      Ambulation: with no difficulty     Fall Risk:  Fall Risk Assessment, last 12 mths 4/9/2021   Able to walk? Yes   Fall in past 12 months? 1   Do you feel unsteady? 0   Are you worried about falling 0   Is TUG test greater than 12 seconds? 0   Is the gait abnormal? 1   Number of falls in past 12 months 1   Fall with injury? 1      Abuse Screen:  Patient is not abused       Cognitive Screening    Has your family/caregiver stated any concerns about your memory: no     Cognitive Screening: Normal - Mini Cog Test    Health Maintenance Due     Health Maintenance Due   Topic Date Due    DTaP/Tdap/Td series (1 - Tdap) Never done    Colorectal Cancer Screening Combo  Never done    Shingrix Vaccine Age 50> (1 of 2) Never done    Breast Cancer Screen Mammogram  Never done    Pneumococcal 65+ years (1 of 1 - PPSV23) Never done    COVID-19 Vaccine (2 - Pfizer 2-dose series) 05/31/2021    Flu Vaccine (1) Never done       Patient Care Team   Patient Care Team:  Charmaine Rubinstein, MD as PCP - General (Internal Medicine)  Charmaine Rubinstein, MD as PCP - [facility] Empaneled Provider    History     Patient Active Problem List   Diagnosis Code    Rheumatoid arthritis ([facility] [address] M06.9    Hypertension I10    Osteoporosis M81.0     Past Medical History:   Diagnosis Date    Arthritis     Rheumatoid arthritis    Ganglion 10/2019    Glaucoma     Hypertension     Osteoporosis     Rheumatoid arthritis ([facility] [address]     Dr. Pérez Hui. Cimzia infusions monthly      Past Surgical History:   Procedure Laterality Date    HX ANKLE FRACTURE TX Right 09/30/2020    HX CARPAL TUNNEL RELEASE Left 2017    HX CARPAL TUNNEL RELEASE Right 2016    HX CHOLECYSTECTOMY  1993     Current Outpatient Medications   Medication Sig Dispense Refill    abatacept (Orencia ClickJect) 055 mg/mL atIn 1 ml      dorzolamide-timoloL (COSOPT) 22.3-6.8 mg/mL ophthalmic solution 1 drop into affected eye      amLODIPine (NORVASC) 5 mg tablet Take 1 Tab by mouth daily.  90 Tab 3    lisinopril-hydroCHLOROthiazide (PRINZIDE, ZESTORETIC) 20-12.5 mg per tablet TAKE 1 TABLET BY MOUTH EVERY DAY 90 Tab 3    latanoprost (XALATAN) 0.005 % ophthalmic solution Administer 1 Drop to both eyes nightly.  multivitamin, tx-iron-ca-min (THERA-M w/ IRON) 9 mg iron-400 mcg tab tablet Take 1 Tab by mouth daily.  cholecalciferol (Vitamin D3) 25 mcg (1,000 unit) cap Take  by mouth daily.  alendronate (Fosamax) 70 mg tablet Take  by mouth.       varicella-zoster recombinant, PF, (Shingrix, PF,) 50 mcg/0.5 mL susr injection 0.5mL by IntraMUSCular route once now and then repeat in 2-6 months 0.5 mL 1     No Known Allergies    Family History   Problem Relation Age of Onset    Heart Disease Mother     Arthritis-osteo Father     Liver Disease Father     Diabetes Sister     Cancer Maternal Aunt     Cancer Maternal Grandmother     Ovarian Cancer Maternal Aunt     Cancer Maternal Aunt         lung and colon     Social History     Tobacco Use    Smoking status: Former Smoker     Packs/day: 0.50     Years: 30.00     Pack years: 15.00     Start date:      Quit date:      Years since quittin.7    Smokeless tobacco: Never Used   Substance Use Topics    Alcohol use: Never     Comment: 3 or 4 weekly         Jagjit Buck MD

## 2021-10-07 NOTE — PATIENT INSTRUCTIONS
Medicare Wellness Visit, Female     The best way to live healthy is to have a lifestyle where you eat a well-balanced diet, exercise regularly, limit alcohol use, and quit all forms of tobacco/nicotine, if applicable. Regular preventive services are another way to keep healthy. Preventive services (vaccines, screening tests, monitoring & exams) can help personalize your care plan, which helps you manage your own care. Screening tests can find health problems at the earliest stages, when they are easiest to treat. Rick follows the current, evidence-based guidelines published by the [facility] P[facility] ([facility]) when recommending preventive services for our patients. Because we follow these guidelines, sometimes recommendations change over time as research supports it. (For example, mammograms used to be recommended annually. Even though Medicare will still pay for an annual mammogram, the newer guidelines recommend a mammogram every two years for women of average risk). Of course, you and your doctor may decide to screen more often for some diseases, based on your risk and your co-morbidities (chronic disease you are already diagnosed with). Preventive services for you include:  - Medicare offers their members a free annual wellness visit, which is time for you and your primary care provider to discuss and plan for your preventive service needs. Take advantage of this benefit every year!  -All adults over the age of 72 should receive the recommended pneumonia vaccines. Current USPSTF guidelines recommend a series of two vaccines for the best pneumonia protection.   -All adults should have a flu vaccine yearly and a tetanus vaccine every 10 years.   -All adults age 48 and older should receive the shingles vaccines (series of two vaccines).       -All adults age 38-68 who are overweight should have a diabetes screening test once every three years.   -All adults born between [birth year] and 1965 should be screened once for Hepatitis C.  -Other screening tests and preventive services for persons with diabetes include: an eye exam to screen for diabetic retinopathy, a kidney function test, a foot exam, and stricter control over your cholesterol.   -Cardiovascular screening for adults with routine risk involves an electrocardiogram (ECG) at intervals determined by your doctor.   -Colorectal cancer screenings should be done for adults age 54-65 with no increased risk factors for colorectal cancer. There are a number of acceptable methods of screening for this type of cancer. Each test has its own benefits and drawbacks. Discuss with your doctor what is most appropriate for you during your annual wellness visit. The different tests include: colonoscopy (considered the best screening method), a fecal occult blood test, a fecal DNA test, and sigmoidoscopy.    -A bone mass density test is recommended when a woman turns 65 to screen for osteoporosis. This test is only recommended one time, as a screening. Some providers will use this same test as a disease monitoring tool if you already have osteoporosis. -Breast cancer screenings are recommended every other year for women of normal risk, age 54-69.  -Cervical cancer screenings for women over age 72 are only recommended with certain risk factors. Here is a list of your current Health Maintenance items (your personalized list of preventive services) with a due date:  Health Maintenance Due   Topic Date Due    DTaP/Tdap/Td  (1 - Tdap) Never done    Colorectal Screening  Never done    Shingles Vaccine (1 of 2) Never done    Mammogram  Never done    Pneumococcal Vaccine (1 of 1 - PPSV23) Never done    COVID-19 Vaccine (2 - Pfizer 2-dose series) 05/31/2021    Yearly Flu Vaccine (1) Never done         Vaccine Information Statement    Influenza (Flu) Vaccine (Inactivated or Recombinant):  What You Need to Know    Many vaccine information statements are available in Turkish and other languages. See www.immunize.org/vis. Hojas de información sobre vacunas están disponibles en español y en muchos otros idiomas. Visite www.immunize.org/vis. 1. Why get vaccinated? Influenza vaccine can prevent influenza (flu). Flu is a contagious disease that spreads around the [facility] every year, usually between October and May. Anyone can get the flu, but it is more dangerous for some people. Infants and young children, people 72 years and older, pregnant people, and people with certain health conditions or a weakened immune system are at greatest risk of flu complications. Pneumonia, bronchitis, sinus infections, and ear infections are examples of flu-related complications. If you have a medical condition, such as heart disease, cancer, or diabetes, flu can make it worse. Flu can cause fever and chills, sore throat, muscle aches, fatigue, cough, headache, and runny or stuffy nose. Some people may have vomiting and diarrhea, though this is more common in children than adults. In an average year, thousands of people in the [facility] die from flu, and many more are hospitalized. Flu vaccine prevents millions of illnesses and flu-related visits to the doctor each year. 2. Influenza vaccines     CDC recommends everyone 6 months and older get vaccinated every flu season. Children 6 months through 6years of age may need 2 doses during a single flu season. Everyone else needs only 1 dose each flu season. It takes about 2 weeks for protection to develop after vaccination. There are many flu viruses, and they are always changing. Each year a new flu vaccine is made to protect against the influenza viruses believed to be likely to cause disease in the upcoming flu season. Even when the vaccine doesnt exactly match these viruses, it may still provide some protection. Influenza vaccine does not cause flu.     Influenza vaccine may be given at the same time as other vaccines. 3. Talk with your health care provider    Tell your vaccination provider if the person getting the vaccine:   Has had an allergic reaction after a previous dose of influenza vaccine, or has any severe, life-threatening allergies    Has ever had Guillain-Barré Syndrome (also called GBS)    In some cases, your health care provider may decide to postpone influenza vaccination until a future visit. Influenza vaccine can be administered at any time during pregnancy. People who are or will be pregnant during influenza season should receive inactivated influenza vaccine. People with minor illnesses, such as a cold, may be vaccinated. People who are moderately or severely ill should usually wait until they recover before getting influenza vaccine. Your health care provider can give you more information. 4. Risks of a vaccine reaction     Soreness, redness, and swelling where the shot is given, fever, muscle aches, and headache can happen after influenza vaccination.  There may be a very small increased risk of Guillain-Barré Syndrome (GBS) after inactivated influenza vaccine (the flu shot). [facility] children who get the flu shot along with pneumococcal vaccine (PCV13) and/or DTaP vaccine at the same time might be slightly more likely to have a seizure caused by fever. Tell your health care provider if a child who is getting flu vaccine has ever had a seizure. People sometimes faint after medical procedures, including vaccination. Tell your provider if you feel dizzy or have vision changes or ringing in the ears. As with any medicine, there is a very remote chance of a vaccine causing a severe allergic reaction, other serious injury, or death. 5. What if there is a serious problem? An allergic reaction could occur after the vaccinated person leaves the clinic.  If you see signs of a severe allergic reaction (hives, swelling of the face and throat, difficulty breathing, a fast heartbeat, dizziness, or weakness), call 9-1-1 and get the person to the nearest hospital.    For other signs that concern you, call your health care provider. Adverse reactions should be reported to the Vaccine Adverse Event Reporting System (VAERS). Your health care provider will usually file this report, or you can do it yourself. Visit the VAERS website at www.vaers. [facility].gov or call 8-186.271.7909. VAERS is only for reporting reactions, and VAERS staff members do not give medical advice. 6. The National Vaccine Injury Compensation Program    The [facility] Vaccine Injury Compensation Program (VICP) is a federal program that was created to compensate people who may have been injured by certain vaccines. Claims regarding alleged injury or death due to vaccination have a time limit for filing, which may be as short as two years. Visit the VICP website at www.[facility].gov/vaccinecompensation or call 3-892.500.8189 to learn about the program and about filing a claim. 7. How can I learn more?  Ask your health care provider.  Call your local or state health department.  Visit the website of the Food and Drug Administration (FDA) for vaccine package inserts and additional information at www.fda.gov/vaccines-blood-biologics/vaccines.  Contact the Centers for Disease Control and Prevention (CDC):  - Call 4-425.732.2432 (1-800-CDC-INFO) or  - Visit CDCs influenza website at www.cdc.gov/flu. Vaccine Information Statement   Inactivated Influenza Vaccine   8/6/2021  42 JUSTINE Smith [id number]   Department of Health and Human Services  Centers for Disease Control and Prevention    Office Use Only

## 2021-12-07 LAB
CREATININE, EXTERNAL: 0.49
INR, EXTERNAL: 1
PT, EXTERNAL: 10.1

## 2022-02-04 ENCOUNTER — OFFICE VISIT (OUTPATIENT)
Dept: ORTHOPEDIC SURGERY | Age: 67
End: 2022-02-04
Payer: MEDICARE

## 2022-02-04 VITALS
TEMPERATURE: 97.1 F | HEART RATE: 95 BPM | BODY MASS INDEX: 27.32 KG/M2 | HEIGHT: 65 IN | OXYGEN SATURATION: 99 % | WEIGHT: 164 LBS

## 2022-02-04 DIAGNOSIS — M16.11 ARTHRITIS OF RIGHT HIP: ICD-10-CM

## 2022-02-04 DIAGNOSIS — M81.0 POST-MENOPAUSAL OSTEOPOROSIS: ICD-10-CM

## 2022-02-04 DIAGNOSIS — M25.551 RIGHT HIP PAIN: Primary | ICD-10-CM

## 2022-02-04 PROCEDURE — 99214 OFFICE O/P EST MOD 30 MIN: CPT | Performed by: ORTHOPAEDIC SURGERY

## 2022-02-04 PROCEDURE — 1101F PT FALLS ASSESS-DOCD LE1/YR: CPT | Performed by: ORTHOPAEDIC SURGERY

## 2022-02-04 PROCEDURE — 1090F PRES/ABSN URINE INCON ASSESS: CPT | Performed by: ORTHOPAEDIC SURGERY

## 2022-02-04 PROCEDURE — 3017F COLORECTAL CA SCREEN DOC REV: CPT | Performed by: ORTHOPAEDIC SURGERY

## 2022-02-04 PROCEDURE — G8432 DEP SCR NOT DOC, RNG: HCPCS | Performed by: ORTHOPAEDIC SURGERY

## 2022-02-04 PROCEDURE — G8536 NO DOC ELDER MAL SCRN: HCPCS | Performed by: ORTHOPAEDIC SURGERY

## 2022-02-04 PROCEDURE — G9899 SCRN MAM PERF RSLTS DOC: HCPCS | Performed by: ORTHOPAEDIC SURGERY

## 2022-02-04 PROCEDURE — G8419 CALC BMI OUT NRM PARAM NOF/U: HCPCS | Performed by: ORTHOPAEDIC SURGERY

## 2022-02-04 PROCEDURE — G8756 NO BP MEASURE DOC: HCPCS | Performed by: ORTHOPAEDIC SURGERY

## 2022-02-04 PROCEDURE — G8427 DOCREV CUR MEDS BY ELIG CLIN: HCPCS | Performed by: ORTHOPAEDIC SURGERY

## 2022-02-04 PROCEDURE — 73502 X-RAY EXAM HIP UNI 2-3 VIEWS: CPT | Performed by: ORTHOPAEDIC SURGERY

## 2022-02-04 NOTE — PROGRESS NOTES
Patient: Italia Gonzales                MRN: [id number]       SSN: [id number]  YOB: 1955        AGE: 79 y.o. SEX: female  Body mass index is 27.29 kg/m². PCP: Castro Ross MD  02/04/22    Returns in follow-up with regards to right hip pain she is a very tough lady with rheumatoid arthritis and I want her to have a bone density test as she is actually a -3.9 which is very concerning she also has bilateral partial abductor tear is seen on the MRI previous AVN quiescent the radiologist says no but I believe that she has had a and she minimizes her pain that she is very tough lady and she is still very functional she can still walk 10 or 15 minutes reasonably comfortably and the pain is mild to moderate some days are worse than others    Examination today she is very pleasant lady walks with a mildly antalgic gait on that side but not severely so left hip rotates nicely right hip is fairly stiff with only about 3 degrees of internal rotation her abductors are firing bilaterally little bit weaker than normal    Calf nontender Homans' sign is negative x-rays today we did did 3 views of the right hip including AP pelvis confirming very severe end-stage arthritis involving the hip itself and significant osteoporosis as well.     At this point I I think she should get on an injectable bone strengthening agent as per Dr. Nikolai Flores for about a year and then I think we should replace the hip at this point with I am actually favoring more of an anterolateral approach as opposed to the direct anterior for so we can repair the abductors and have a little more control over the leg outside of the traction table due to her's very significant osteoporosis I think it will be a bit safer for was a discussion regarding surgery was decided that it is recommended that we should use a bone strengthening agent such as Forteo or the equivalent that Dr. Nikolai Flores feels appropriate in the next year    REVIEW OF SYSTEMS:      CON: negative  EYE: negative   ENT: negative  RESP: negative  GI:    negative   :  negative  MSK: Positive  A twelve point review of systems was completed, positives noted and all other systems were reviewed and are negative          Past Medical History:   Diagnosis Date    Arthritis     Rheumatoid arthritis    Ganglion 10/2019    Glaucoma     Hypertension     Osteoporosis     Rheumatoid arthritis ([address]     Dr. Melinda So. Cimzia infusions monthly       Family History   Problem Relation Age of Onset    Heart Disease Mother     OSTEOARTHRITIS Father     Liver Disease Father     Diabetes Sister     Cancer Maternal Aunt     Cancer Maternal Grandmother     Ovarian Cancer Maternal Aunt     Cancer Maternal Aunt         lung and colon       Current Outpatient Medications   Medication Sig Dispense Refill    abatacept (Orencia ClickJect) 896 mg/mL atIn 1 ml      dorzolamide-timoloL (COSOPT) 22.3-6.8 mg/mL ophthalmic solution 1 drop into affected eye      lisinopril-hydroCHLOROthiazide (PRINZIDE, ZESTORETIC) 20-12.5 mg per tablet TAKE 1 TABLET BY MOUTH EVERY DAY 90 Tablet 3    amLODIPine (NORVASC) 5 mg tablet Take 1 Tablet by mouth daily. 90 Tablet 3    varicella-zoster recombinant, PF, (Shingrix, PF,) 50 mcg/0.5 mL susr injection 0.5mL by IntraMUSCular route once now and then repeat in 2-6 months 0.5 mL 1    latanoprost (XALATAN) 0.005 % ophthalmic solution Administer 1 Drop to both eyes nightly.  multivitamin, tx-iron-ca-min (THERA-M w/ IRON) 9 mg iron-400 mcg tab tablet Take 1 Tab by mouth daily.  cholecalciferol (Vitamin D3) 25 mcg (1,000 unit) cap Take  by mouth daily.  alendronate (Fosamax) 70 mg tablet Take  by mouth.          No Known Allergies    Past Surgical History:   Procedure Laterality Date    HX ANKLE FRACTURE TX Right 09/30/2020    HX CARPAL TUNNEL RELEASE Left 2017    HX CARPAL TUNNEL RELEASE Right 2016    HX CHOLECYSTECTOMY  1993       Social History Socioeconomic History    Marital status:      Spouse name: Not on file    Number of children: Not on file    Years of education: Not on file    Highest education level: Not on file   Occupational History    Not on file   Tobacco Use    Smoking status: Former Smoker     Packs/day: 0.50     Years: 30.00     Pack years: 15.00     Start date:      Quit date:      Years since quittin.1    Smokeless tobacco: Never Used   Substance and Sexual Activity    Alcohol use: Never     Comment: 3 or 4 weekly    Drug use: Never    Sexual activity: Not on file   Other Topics Concern    Not on file   Social History Narrative    Not on file     Social Determinants of Health     Financial Resource Strain:     Difficulty of Paying Living Expenses: Not on file   Food Insecurity:     Worried About [address] in the Last Year: Not on file    [address] [employer] in the Last Year: Not on file   Transportation Needs:     Lack of Transportation (Medical): Not on file    Lack of Transportation (Non-Medical):  Not on file   Physical Activity:     Days of Exercise per Week: Not on file    Minutes of Exercise per Session: Not on file   Stress:     Feeling of Stress : Not on file   Social Connections:     Frequency of Communication with Friends and Family: Not on file    Frequency of Social Gatherings with Friends and Family: Not on file    Attends Baptism Services: Not on file    Active Member of [address] [place] [address] or Organizations: Not on file    Attends Club or Organization Meetings: Not on file    Marital Status: Not on file   Intimate Partner Violence:     Fear of Current or Ex-Partner: Not on file    Emotionally Abused: Not on file    Physically Abused: Not on file    Sexually Abused: Not on file   Housing Stability:     Unable to Pay for Housing in the Last Year: Not on file    Number of [place] in the Last Year: Not on file    Unstable Housing in the Last Year: Not on file       Visit Vitals  Pulse 95   Temp 97.1 °F (36.2 °C) (Temporal)   Ht 5' 5\" (1.651 m)   Wt 74.4 kg (164 lb)   SpO2 99%   BMI 27.29 kg/m²         PHYSICAL EXAMINATION:  GENERAL: Alert and oriented x3, in no acute distress, well-developed, well-nourished, afebrile. HEART: No JVD. EYES: No scleral icterus   NECK: No significant lymphadenopathy   LUNGS: No respiratory compromise or indrawing  ABDOMEN: Soft, non-tender, non-distended. Note: This note was completed using voice recognition software. Any typographical/name errors or mistakes are unintentional.    Electronically signed by:  Misti Amato MD

## 2022-04-11 ENCOUNTER — OFFICE VISIT (OUTPATIENT)
Dept: INTERNAL MEDICINE CLINIC | Age: 67
End: 2022-04-11
Payer: MEDICARE

## 2022-04-11 VITALS
TEMPERATURE: 97.2 F | HEIGHT: 65 IN | WEIGHT: 175.1 LBS | BODY MASS INDEX: 29.17 KG/M2 | RESPIRATION RATE: 18 BRPM | OXYGEN SATURATION: 100 % | HEART RATE: 79 BPM | SYSTOLIC BLOOD PRESSURE: 124 MMHG | DIASTOLIC BLOOD PRESSURE: 68 MMHG

## 2022-04-11 DIAGNOSIS — Z12.39 ENCOUNTER FOR SCREENING FOR MALIGNANT NEOPLASM OF BREAST, UNSPECIFIED SCREENING MODALITY: ICD-10-CM

## 2022-04-11 DIAGNOSIS — I10 HYPERTENSION, UNSPECIFIED TYPE: Primary | ICD-10-CM

## 2022-04-11 PROCEDURE — 99213 OFFICE O/P EST LOW 20 MIN: CPT | Performed by: INTERNAL MEDICINE

## 2022-04-11 PROCEDURE — G8510 SCR DEP NEG, NO PLAN REQD: HCPCS | Performed by: INTERNAL MEDICINE

## 2022-04-11 PROCEDURE — 1090F PRES/ABSN URINE INCON ASSESS: CPT | Performed by: INTERNAL MEDICINE

## 2022-04-11 PROCEDURE — 3017F COLORECTAL CA SCREEN DOC REV: CPT | Performed by: INTERNAL MEDICINE

## 2022-04-11 PROCEDURE — G8536 NO DOC ELDER MAL SCRN: HCPCS | Performed by: INTERNAL MEDICINE

## 2022-04-11 PROCEDURE — G9899 SCRN MAM PERF RSLTS DOC: HCPCS | Performed by: INTERNAL MEDICINE

## 2022-04-11 PROCEDURE — G0463 HOSPITAL OUTPT CLINIC VISIT: HCPCS | Performed by: INTERNAL MEDICINE

## 2022-04-11 PROCEDURE — G8419 CALC BMI OUT NRM PARAM NOF/U: HCPCS | Performed by: INTERNAL MEDICINE

## 2022-04-11 PROCEDURE — G8427 DOCREV CUR MEDS BY ELIG CLIN: HCPCS | Performed by: INTERNAL MEDICINE

## 2022-04-11 PROCEDURE — G8754 DIAS BP LESS 90: HCPCS | Performed by: INTERNAL MEDICINE

## 2022-04-11 PROCEDURE — G8752 SYS BP LESS 140: HCPCS | Performed by: INTERNAL MEDICINE

## 2022-04-11 PROCEDURE — 1101F PT FALLS ASSESS-DOCD LE1/YR: CPT | Performed by: INTERNAL MEDICINE

## 2022-04-11 NOTE — PROGRESS NOTES
Elmer Womack presents today for No chief complaint on file. Is someone accompanying this pt? no    Is the patient using any DME equipment during [address]? no    Depression Screening:  3 most recent PHQ Screens 4/11/2022   PHQ Not Done -   Little interest or pleasure in doing things Not at all   Feeling down, depressed, irritable, or hopeless Not at all   Total Score PHQ 2 0       Learning Assessment:  No flowsheet data found. Abuse Screening:  Abuse Screening Questionnaire 4/11/2022   Do you ever feel afraid of your partner? N   Are you in a relationship with someone who physically or mentally threatens you? N   Is it safe for you to go home? Y       Fall Risk  Fall Risk Assessment, last 12 mths 4/11/2022   Able to walk? Yes   Fall in past 12 months? 0   Do you feel unsteady? 0   Are you worried about falling 0   Is TUG test greater than 12 seconds? -   Is the gait abnormal? -   Number of falls in past 12 months -   Fall with injury? -       ADL  ADL Assessment 4/9/2021   Feeding yourself No Help Needed   Getting from bed to chair No Help Needed   Getting dressed No Help Needed   Bathing or showering No Help Needed   Walk across the room (includes cane/walker) No Help Needed   Using the telphone No Help Needed   Taking your medications No Help Needed   Preparing meals No Help Needed   Managing money (expenses/bills) No Help Needed   Moderately strenuous housework (laundry) No Help Needed   Shopping for personal items (toiletries/medicines) No Help Needed   Shopping for groceries No Help Needed   Driving No Help Needed   Climbing a flight of stairs No Help Needed   Getting to places beyond walking distances No Help Needed       Health Maintenance reviewed and discussed and ordered per Provider.     Health Maintenance Due   Topic Date Due    DTaP/Tdap/Td series (1 - Tdap) Never done    Shingrix Vaccine Age 50> (1 of 2) Never done    Breast Cancer Screen Mammogram  Never done    Pneumococcal 65+ years (1 of 1 - PPSV23) Never done    Depression Screen  04/09/2022   .    1. \"Have you been to the ER, urgent care clinic since your last visit? Hospitalized since your last visit? \" No    2. \"Have you seen or consulted any other health care providers outside of the [address] [place] [address] since your last visit? \" No     3. For patients aged 39-70: Has the patient had a colonoscopy / FIT/ Cologuard? Yes - no Care Gap present      If the patient is female:    4. For patients aged 41-77: Has the patient had a mammogram within the past 2 years? Yes - no Care Gap present      5. For patients aged 21-65: Has the patient had a pap smear?  N/A

## 2022-04-11 NOTE — PROGRESS NOTES
HPI     Lambert James is here in follow-up of hypertension. She reports gastroenterology recommended the hepatitis A and hepatitis B vaccines, which she is in the process of getting. I also reminded her to get her Shingrix vaccine in the not too distant future. She would like an order for a mammogram.  She would also like a referral to a gynecologist.  I advised her I need to do a breast exam before ordering her mammogram, so we know if we need a screening or diagnostic mammogram.  She agrees. She saw ENT, who did not think there was anything pathologic about her enlarged salivary glands. She feels this has improved. Past Medical History:   Diagnosis Date    Arthritis     Rheumatoid arthritis    Ganglion 10/2019    Glaucoma     Hypertension     Osteoporosis     Rheumatoid arthritis ([address]     Dr. Nena Snider. Cimzia infusions monthly        Past Surgical History:   Procedure Laterality Date    HX ANKLE FRACTURE TX Right 09/30/2020    HX CARPAL TUNNEL RELEASE Left 2017    HX CARPAL TUNNEL RELEASE Right 2016    HX CHOLECYSTECTOMY  1993        Current Outpatient Medications   Medication Sig Dispense Refill    abatacept (Orencia ClickJect) 434 mg/mL atIn 1 ml      dorzolamide-timoloL (COSOPT) 22.3-6.8 mg/mL ophthalmic solution 1 drop into affected eye      lisinopril-hydroCHLOROthiazide (PRINZIDE, ZESTORETIC) 20-12.5 mg per tablet TAKE 1 TABLET BY MOUTH EVERY DAY 90 Tablet 3    amLODIPine (NORVASC) 5 mg tablet Take 1 Tablet by mouth daily. 90 Tablet 3    varicella-zoster recombinant, PF, (Shingrix, PF,) 50 mcg/0.5 mL susr injection 0.5mL by IntraMUSCular route once now and then repeat in 2-6 months 0.5 mL 1    latanoprost (XALATAN) 0.005 % ophthalmic solution Administer 1 Drop to both eyes nightly.  multivitamin, tx-iron-ca-min (THERA-M w/ IRON) 9 mg iron-400 mcg tab tablet Take 1 Tab by mouth daily.  cholecalciferol (Vitamin D3) 25 mcg (1,000 unit) cap Take  by mouth daily.       alendronate (Fosamax) 70 mg tablet Take  by mouth. (Patient not taking: Reported on 2022)          No Known Allergies     Social History     Socioeconomic History    Marital status:      Spouse name: Not on file    Number of children: Not on file    Years of education: Not on file    Highest education level: Not on file   Occupational History    Not on file   Tobacco Use    Smoking status: Former Smoker     Packs/day: 0.50     Years: 30.00     Pack years: 15.00     Start date:      Quit date:      Years since quittin.2    Smokeless tobacco: Never Used   Substance and Sexual Activity    Alcohol use: Never     Comment: 3 or 4 weekly    Drug use: Never    Sexual activity: Not on file   Other Topics Concern    Not on file   Social History Narrative    Not on file     Social Determinants of Health     Financial Resource Strain:     Difficulty of Paying Living Expenses: Not on file   Food Insecurity:     Worried About [address] in the Last Year: Not on file    [address] in the Last Year: Not on file   Transportation Needs:     Lack of Transportation (Medical): Not on file    Lack of Transportation (Non-Medical):  Not on file   Physical Activity:     Days of Exercise per Week: Not on file    Minutes of Exercise per Session: Not on file   Stress:     Feeling of Stress : Not on file   Social Connections:     Frequency of Communication with Friends and Family: Not on file    Frequency of Social Gatherings with Friends and Family: Not on file    Attends Taoism Services: Not on file    Active Member of Clubs or Organizations: Not on file    Attends Club or Organization Meetings: Not on file    Marital Status: Not on file   Intimate Partner Violence:     Fear of Current or Ex-Partner: Not on file    Emotionally Abused: Not on file    Physically Abused: Not on file    Sexually Abused: Not on file   Housing Stability:     Unable to Pay for Housing in the Last Year: Not on file    Number of Places Lived in the Last Year: Not on file    Unstable Housing in the Last Year: Not on file        Family History   Problem Relation Age of Onset    Heart Disease Mother     OSTEOARTHRITIS Father     Liver Disease Father     Diabetes Sister     Cancer Maternal Aunt     Cancer Maternal Grandmother     Ovarian Cancer Maternal Aunt     Cancer Maternal Aunt         lung and colon           Visit Vitals  /68   Pulse 79   Temp 97.2 °F (36.2 °C) (Temporal)   Resp 18   Ht 5' 5\" (1.651 m)   Wt 175 lb 1.6 oz (79.4 kg)   SpO2 100%   BMI 29.14 kg/m²        Review of Systems   Eyes: Negative for blurred vision. Respiratory: Negative for shortness of breath. Cardiovascular: Negative for chest pain. Gastrointestinal: Negative for blood in stool. Genitourinary: Negative for hematuria. No vaginal bleeding. Neurological: Negative for headaches. Psychiatric/Behavioral: Negative for depression. The patient is not nervous/anxious. Physical Exam  Constitutional:       General: She is not in acute distress. Eyes:      General: No scleral icterus. Conjunctiva/sclera: Conjunctivae normal.   Neck:      Comments: Bilateral carotid upstroke normal to palpation. Lymph: No cervical or supraclavicular lymphadenopathy. Cardiovascular:      Rate and Rhythm: Normal rate and regular rhythm. Pulses: Normal pulses. Heart sounds: No friction rub. No gallop. Pulmonary:      Effort: Pulmonary effort is normal.      Breath sounds: Normal breath sounds. Abdominal:      General: Abdomen is flat. Palpations: Abdomen is soft. Tenderness: There is no abdominal tenderness. Genitourinary:     Comments: Breasts: No mass, discharge, or skin changes bilaterally. Musculoskeletal:      Cervical back: Neck supple. Comments: No clubbing, cyanosis, or edema. Skin:     General: Skin is warm and dry.    Neurological:      Mental Status: She is alert and oriented to person, place, and time. Psychiatric:         Mood and Affect: Mood normal.                  Diagnoses and all orders for this visit:    1. Hypertension, unspecified type  Comments:  Controlled, continue medication. Orders:  -     LIPID PANEL; Future    2. Encounter for screening for malignant neoplasm of breast, unspecified screening modality  Comments:  Normal breast exam, order sent for screening mammogram.  Given name of gynecologist in area. Orders:  -     [facility] MAMMO BI SCREENING INCL CAD; Future         Follow-up and Dispositions    · Return in about 6 months (around 10/11/2022) for MCWL/ ACP.               Ning Paulino MD

## 2022-04-15 ENCOUNTER — TRANSCRIBE ORDER (OUTPATIENT)
Dept: SCHEDULING | Age: 67
End: 2022-04-15

## 2022-04-15 DIAGNOSIS — Z12.31 SCREENING MAMMOGRAM, ENCOUNTER FOR: Primary | ICD-10-CM

## 2022-04-29 ENCOUNTER — TRANSCRIBE ORDER (OUTPATIENT)
Dept: SCHEDULING | Age: 67
End: 2022-04-29

## 2022-04-29 DIAGNOSIS — R79.89 ELEVATED LFTS: ICD-10-CM

## 2022-04-29 DIAGNOSIS — K63.5 POLYP OF COLON: ICD-10-CM

## 2022-04-29 DIAGNOSIS — K76.0 STEATOSIS OF LIVER: Primary | ICD-10-CM

## 2022-05-05 ENCOUNTER — TRANSCRIBE ORDER (OUTPATIENT)
Dept: SCHEDULING | Age: 67
End: 2022-05-05

## 2022-05-05 DIAGNOSIS — K63.5 POLYP OF COLON: ICD-10-CM

## 2022-05-05 DIAGNOSIS — R79.89 ELEVATED LFTS: ICD-10-CM

## 2022-05-05 DIAGNOSIS — K76.0 STEATOSIS OF LIVER: Primary | ICD-10-CM

## 2022-05-12 ENCOUNTER — HOSPITAL ENCOUNTER (OUTPATIENT)
Dept: MAMMOGRAPHY | Age: 67
Discharge: HOME | End: 2022-05-12
Attending: INTERNAL MEDICINE
Payer: MEDICARE

## 2022-05-12 DIAGNOSIS — Z12.31 SCREENING MAMMOGRAM, ENCOUNTER FOR: ICD-10-CM

## 2022-05-12 PROCEDURE — 77063 BREAST TOMOSYNTHESIS BI: CPT

## 2022-06-14 ENCOUNTER — HOSPITAL ENCOUNTER (OUTPATIENT)
Dept: ULTRASOUND IMAGING | Age: 67
Discharge: HOME | End: 2022-06-14
Attending: INTERNAL MEDICINE
Payer: MEDICARE

## 2022-06-14 DIAGNOSIS — K76.0 STEATOSIS OF LIVER: ICD-10-CM

## 2022-06-14 DIAGNOSIS — K63.5 POLYP OF COLON: ICD-10-CM

## 2022-06-14 DIAGNOSIS — R79.89 ELEVATED LFTS: ICD-10-CM

## 2022-06-14 PROCEDURE — 76705 ECHO EXAM OF ABDOMEN: CPT

## 2022-08-15 ENCOUNTER — HOSPITAL ENCOUNTER (OUTPATIENT)
Dept: GENERAL RADIOLOGY | Age: 67
Discharge: HOME | End: 2022-08-15
Payer: MEDICARE

## 2022-08-15 DIAGNOSIS — M05.79 RHEU ARTHRITIS W RHEU FACTOR MULT SITE W/O ORG/SYS INVOLV (HCC): ICD-10-CM

## 2022-08-15 PROCEDURE — 71046 X-RAY EXAM CHEST 2 VIEWS: CPT

## 2022-09-02 ENCOUNTER — OFFICE VISIT (OUTPATIENT)
Dept: ORTHOPEDIC SURGERY | Age: 67
End: 2022-09-02
Payer: MEDICARE

## 2022-09-02 DIAGNOSIS — M25.551 RIGHT HIP PAIN: Primary | ICD-10-CM

## 2022-09-02 DIAGNOSIS — M81.0 POST-MENOPAUSAL OSTEOPOROSIS: ICD-10-CM

## 2022-09-02 DIAGNOSIS — M16.11 PRIMARY OSTEOARTHRITIS OF RIGHT HIP: ICD-10-CM

## 2022-09-02 PROCEDURE — G8417 CALC BMI ABV UP PARAM F/U: HCPCS | Performed by: ORTHOPAEDIC SURGERY

## 2022-09-02 PROCEDURE — G8427 DOCREV CUR MEDS BY ELIG CLIN: HCPCS | Performed by: ORTHOPAEDIC SURGERY

## 2022-09-02 PROCEDURE — 3017F COLORECTAL CA SCREEN DOC REV: CPT | Performed by: ORTHOPAEDIC SURGERY

## 2022-09-02 PROCEDURE — G8432 DEP SCR NOT DOC, RNG: HCPCS | Performed by: ORTHOPAEDIC SURGERY

## 2022-09-02 PROCEDURE — 1090F PRES/ABSN URINE INCON ASSESS: CPT | Performed by: ORTHOPAEDIC SURGERY

## 2022-09-02 PROCEDURE — 99214 OFFICE O/P EST MOD 30 MIN: CPT | Performed by: ORTHOPAEDIC SURGERY

## 2022-09-02 PROCEDURE — G8756 NO BP MEASURE DOC: HCPCS | Performed by: ORTHOPAEDIC SURGERY

## 2022-09-02 PROCEDURE — G9899 SCRN MAM PERF RSLTS DOC: HCPCS | Performed by: ORTHOPAEDIC SURGERY

## 2022-09-02 PROCEDURE — 1123F ACP DISCUSS/DSCN MKR DOCD: CPT | Performed by: ORTHOPAEDIC SURGERY

## 2022-09-02 PROCEDURE — G8536 NO DOC ELDER MAL SCRN: HCPCS | Performed by: ORTHOPAEDIC SURGERY

## 2022-09-02 PROCEDURE — 1101F PT FALLS ASSESS-DOCD LE1/YR: CPT | Performed by: ORTHOPAEDIC SURGERY

## 2022-09-02 PROCEDURE — 72170 X-RAY EXAM OF PELVIS: CPT | Performed by: ORTHOPAEDIC SURGERY

## 2022-09-02 NOTE — PROGRESS NOTES
Patient: Corina Rodas                MRN: [id number]       SSN: [id number]  YOB: 1955        AGE: 79 y.o. SEX: female  There is no height or weight on file to calculate BMI. PCP: Etelvina Bower MD  09/02/22    Noelle Colon returns for reevaluation of right-sided and hip pain things are currently stable and delighted that she is on Forteo at this point and I would expect her to be on for about a year or so prior to surgery she had fairly severe osteoporosis and his right groin pain would like her to have a cane for longer distances night pains of future but no recent trauma she is accompanied by her sister today and they are quite close.   And personality as well    The exam the left hip rotates nicely the right hip is fairly stiff which reproduces her groin discomfort calf nontender Homans' sign is negative    Both feet warm and well-perfused    Reviewed her x-rays AP pelvis show quite severe osteoporosis specially of the femur with a type C femur and she has some cysts in the femoral head but no collapse some osteophytes    We will see her back in 3 months to repeat the AP pelvis on delighted she is on Forteo and Elisabeth plan on a hybrid hip replacement with likely cement fixation on the femoral side and a press-fit cup we also did discuss the role of minimally invasive surgery which I think would be inadvisable we have to be very gentle when we manipulate her femur thank you risk and benefits involving total hip replacement were described including but not limited to infection DVT pulmonary embolism anesthetic complications blood loss requiring transfusion dislocation leg length discrepancy limp fracture chronic pain bursitis    REVIEW OF SYSTEMS:      CON: negative  EYE: negative   ENT: negative  RESP: negative  GI:    negative   :  negative  MSK: Positive  A twelve point review of systems was completed, positives noted and all other systems were reviewed and are negative          Past Medical History:   Diagnosis Date    Arthritis     Rheumatoid arthritis    Ganglion 10/2019    Glaucoma     Hypertension     Osteoporosis     Rheumatoid arthritis ([address]     Dr. Trina Craig. Cimzia infusions monthly       Family History   Problem Relation Age of Onset    Heart Disease Mother     OSTEOARTHRITIS Father     Liver Disease Father     Diabetes Sister     Cancer Maternal Aunt     Cancer Maternal Grandmother     Ovarian Cancer Maternal Aunt     Cancer Maternal Aunt         lung and colon       Current Outpatient Medications   Medication Sig Dispense Refill    abatacept (Orencia ClickJect) 149 mg/mL atIn 1 ml      dorzolamide-timoloL (COSOPT) 22.3-6.8 mg/mL ophthalmic solution 1 drop into affected eye      lisinopril-hydroCHLOROthiazide (PRINZIDE, ZESTORETIC) 20-12.5 mg per tablet TAKE 1 TABLET BY MOUTH EVERY DAY 90 Tablet 3    amLODIPine (NORVASC) 5 mg tablet Take 1 Tablet by mouth daily. 90 Tablet 3    varicella-zoster recombinant, PF, (Shingrix, PF,) 50 mcg/0.5 mL susr injection 0.5mL by IntraMUSCular route once now and then repeat in 2-6 months 0.5 mL 1    latanoprost (XALATAN) 0.005 % ophthalmic solution Administer 1 Drop to both eyes nightly. multivitamin, tx-iron-ca-min (THERA-M w/ IRON) 9 mg iron-400 mcg tab tablet Take 1 Tab by mouth daily. cholecalciferol (VITAMIN D3) 25 mcg (1,000 unit) cap Take  by mouth daily. alendronate (FOSAMAX) 70 mg tablet Take  by mouth.  (Patient not taking: No sig reported)         No Known Allergies    Past Surgical History:   Procedure Laterality Date    HX ANKLE FRACTURE TX Right 09/30/2020    HX CARPAL TUNNEL RELEASE Left 2017    HX CARPAL TUNNEL RELEASE Right 2016    HX CHOLECYSTECTOMY  1993       Social History     Socioeconomic History    Marital status:      Spouse name: Not on file    Number of children: Not on file    Years of education: Not on file    Highest education level: Not on file   Occupational History    Not on file   Tobacco Use    Smoking status: Former     Packs/day: 0.50     Years: 30.00     Pack years: 15.00     Types: Cigarettes     Start date: 80     Quit date:      Years since quittin.6    Smokeless tobacco: Never   Substance and Sexual Activity    Alcohol use: Never     Comment: 3 or 4 weekly    Drug use: Never    Sexual activity: Not on file   Other Topics Concern    Not on file   Social History Narrative    Not on file     Social Determinants of Health     Financial Resource Strain: Not on file   Food Insecurity: Not on file   Transportation Needs: Not on file   Physical Activity: Not on file   Stress: Not on file   Social Connections: Not on file   Intimate Partner Violence: Not on file   Housing Stability: Not on file       There were no vitals taken for this visit. PHYSICAL EXAMINATION:  GENERAL: Alert and oriented x3, in no acute distress, well-developed, well-nourished, afebrile. HEART: No JVD. EYES: No scleral icterus   NECK: No significant lymphadenopathy   LUNGS: No respiratory compromise or indrawing  ABDOMEN: Soft, non-tender, non-distended. Note: This note was completed using voice recognition software. Any typographical/name errors or mistakes are unintentional.    Electronically signed by:  Vinnie Rao MD

## 2022-09-26 ENCOUNTER — TELEPHONE (OUTPATIENT)
Dept: ORTHOPEDIC SURGERY | Age: 67
End: 2022-09-26

## 2022-09-26 NOTE — TELEPHONE ENCOUNTER
Patient states her dentist faxed over paperwork to the [facility] office for her up coming dental work. She states she stopped by the [facility] office 9/23/22 to confirm it was there. She states she was also told it would be done that day. She would like a phone call as her dental procedure is this Thurs 9/29 and her dentist told her it may be postponed.     She may be reached at [phone]

## 2022-09-27 RX ORDER — AMOXICILLIN 500 MG/1
CAPSULE ORAL
Qty: 4 CAPSULE | Refills: 3 | Status: SHIPPED | OUTPATIENT
Start: 2022-09-27

## 2022-09-27 NOTE — TELEPHONE ENCOUNTER
Patient advised the form was signed and faxed to number provided on the form [phone]. Advised patient that a RX was sent to her pharmacy for her to take prior to her dental appointment and that this is a life time requirement when ever she is having dental work.

## 2022-09-27 NOTE — TELEPHONE ENCOUNTER
Deysi from patients dental office says the form they sent us was returned incomplete and they need to confirm that patient is ok to take antibiotics while receiving Forteo injections and they also have a question regarding orencia. Please contact Deysi at [phone].

## 2022-09-28 NOTE — TELEPHONE ENCOUNTER
Need to discuss with whomever prescribed those medications. Ok to take abx while taking bone strengthening meds.

## 2022-09-28 NOTE — TELEPHONE ENCOUNTER
Spoke with and advised they need to discuss with whomever prescribed those medications. Ok to take abx while taking bone strengthening meds.

## 2022-09-29 NOTE — TELEPHONE ENCOUNTER
Patient call back and was asking why she has to take the amoxicillin if she hasnt had her hip replaced yet. After looking in her chart, She has not had her hip replacement so she does not need to be pre medicated from our stand point. I called and explained this to the patient and Deysi at her dentist office. Deysi would like a new form filled out stating this. It will be faxed to [facility].  I will complete it this afternoon

## 2022-10-04 ENCOUNTER — APPOINTMENT (OUTPATIENT)
Dept: INTERNAL MEDICINE CLINIC | Age: 67
End: 2022-10-04

## 2022-10-04 ENCOUNTER — HOSPITAL ENCOUNTER (OUTPATIENT)
Dept: LAB | Age: 67
Discharge: HOME | End: 2022-10-04
Payer: MEDICARE

## 2022-10-04 DIAGNOSIS — I10 HYPERTENSION, UNSPECIFIED TYPE: ICD-10-CM

## 2022-10-04 LAB
CHOLEST SERPL-MCNC: 207 MG/DL
HDLC SERPL-MCNC: 39 MG/DL (ref 40–60)
HDLC SERPL: 5.3 {RATIO} (ref 0–5)
LDLC SERPL CALC-MCNC: 137.4 MG/DL (ref 0–100)
LIPID PROFILE,FLP: ABNORMAL
TRIGL SERPL-MCNC: 153 MG/DL (ref ?–150)
VLDLC SERPL CALC-MCNC: 30.6 MG/DL

## 2022-10-04 PROCEDURE — 80061 LIPID PANEL: CPT

## 2022-10-04 PROCEDURE — 36415 COLL VENOUS BLD VENIPUNCTURE: CPT

## 2022-10-11 ENCOUNTER — OFFICE VISIT (OUTPATIENT)
Dept: INTERNAL MEDICINE CLINIC | Age: 67
End: 2022-10-11
Payer: MEDICARE

## 2022-10-11 VITALS
HEIGHT: 65 IN | TEMPERATURE: 97.8 F | BODY MASS INDEX: 29.42 KG/M2 | DIASTOLIC BLOOD PRESSURE: 70 MMHG | RESPIRATION RATE: 18 BRPM | WEIGHT: 176.6 LBS | HEART RATE: 89 BPM | OXYGEN SATURATION: 98 % | SYSTOLIC BLOOD PRESSURE: 114 MMHG

## 2022-10-11 DIAGNOSIS — M06.9 RHEUMATOID ARTHRITIS, INVOLVING UNSPECIFIED SITE, UNSPECIFIED WHETHER RHEUMATOID FACTOR PRESENT (HCC): ICD-10-CM

## 2022-10-11 DIAGNOSIS — M81.0 OSTEOPOROSIS, POSTMENOPAUSAL: ICD-10-CM

## 2022-10-11 DIAGNOSIS — Z00.00 MEDICARE ANNUAL WELLNESS VISIT, SUBSEQUENT: Primary | ICD-10-CM

## 2022-10-11 DIAGNOSIS — I10 ESSENTIAL (PRIMARY) HYPERTENSION: ICD-10-CM

## 2022-10-11 DIAGNOSIS — Z71.89 ADVANCED DIRECTIVES, COUNSELING/DISCUSSION: ICD-10-CM

## 2022-10-11 PROCEDURE — G0439 PPPS, SUBSEQ VISIT: HCPCS | Performed by: INTERNAL MEDICINE

## 2022-10-11 PROCEDURE — G9899 SCRN MAM PERF RSLTS DOC: HCPCS | Performed by: INTERNAL MEDICINE

## 2022-10-11 PROCEDURE — G8752 SYS BP LESS 140: HCPCS | Performed by: INTERNAL MEDICINE

## 2022-10-11 PROCEDURE — 1123F ACP DISCUSS/DSCN MKR DOCD: CPT | Performed by: INTERNAL MEDICINE

## 2022-10-11 PROCEDURE — 99497 ADVNCD CARE PLAN 30 MIN: CPT | Performed by: INTERNAL MEDICINE

## 2022-10-11 PROCEDURE — G8427 DOCREV CUR MEDS BY ELIG CLIN: HCPCS | Performed by: INTERNAL MEDICINE

## 2022-10-11 PROCEDURE — 99214 OFFICE O/P EST MOD 30 MIN: CPT | Performed by: INTERNAL MEDICINE

## 2022-10-11 PROCEDURE — G8754 DIAS BP LESS 90: HCPCS | Performed by: INTERNAL MEDICINE

## 2022-10-11 PROCEDURE — G0463 HOSPITAL OUTPT CLINIC VISIT: HCPCS | Performed by: INTERNAL MEDICINE

## 2022-10-11 PROCEDURE — G8417 CALC BMI ABV UP PARAM F/U: HCPCS | Performed by: INTERNAL MEDICINE

## 2022-10-11 PROCEDURE — 1101F PT FALLS ASSESS-DOCD LE1/YR: CPT | Performed by: INTERNAL MEDICINE

## 2022-10-11 PROCEDURE — G8510 SCR DEP NEG, NO PLAN REQD: HCPCS | Performed by: INTERNAL MEDICINE

## 2022-10-11 PROCEDURE — 3017F COLORECTAL CA SCREEN DOC REV: CPT | Performed by: INTERNAL MEDICINE

## 2022-10-11 PROCEDURE — G8536 NO DOC ELDER MAL SCRN: HCPCS | Performed by: INTERNAL MEDICINE

## 2022-10-11 PROCEDURE — 1090F PRES/ABSN URINE INCON ASSESS: CPT | Performed by: INTERNAL MEDICINE

## 2022-10-11 RX ORDER — AMLODIPINE BESYLATE 5 MG/1
5 TABLET ORAL DAILY
Qty: 90 TABLET | Refills: 3 | Status: SHIPPED | OUTPATIENT
Start: 2022-10-11

## 2022-10-11 RX ORDER — LISINOPRIL AND HYDROCHLOROTHIAZIDE 12.5; 2 MG/1; MG/1
TABLET ORAL
Qty: 90 TABLET | Refills: 3 | Status: SHIPPED | OUTPATIENT
Start: 2022-10-11

## 2022-10-11 NOTE — PROGRESS NOTES
Chief Complaint   Patient presents with    Annual Wellness Visit         1. \"Have you been to the ER, urgent care clinic since your last visit? Hospitalized since your last visit? \" Pt saw at [facility] for sinus infection in July 2. \"Have you seen or consulted any other health care providers outside of the [address] [place] [address] since your last visit? \" No    3. For patients aged 39-70: Has the patient had a colonoscopy? Yes, no care gap present    If the patient is female:    4. For patients aged 41-77: Has the patient had a mammogram within the past 2 years? Yes, no care gap present    5. For patients aged 21-65: Has the patient had a pap smear?  NA, based on age

## 2022-10-11 NOTE — ACP (ADVANCE CARE PLANNING)
Advance Care Planning     Advance Care Planning (ACP) Physician/NP/PA Conversation      Date of Conversation: 10/11/2022  Conducted with: Patient with Decision Making Capacity    Healthcare Decision Maker:     Secondary Decision Maker: marika sweeney - [employer] - [phone]  Click here to complete [employer] including selection of the [employer] Relationship (ie \"Primary\")      Today we documented Decision Maker(s) consistent with Legal Next of Kin hierarchy. Care Preferences:    Hospitalization: \"If your health worsens and it becomes clear that your chance of recovery is unlikely, what would be your preference regarding hospitalization? \"  The patient would prefer comfort-focused treatment without hospitalization. Ventilation: \"If you were unable to breathe on your own and your chance of recovery was unlikely, what would be your preference about the use of a ventilator (breathing machine) if it was available to you? \"   The patient would desire the use of a ventilator. Resuscitation: \"In the event your heart stopped as a result of an underlying serious health condition, would you want attempts to be made to restart your heart, or would you prefer a natural death? \"   Yes, attempt to resuscitate.     Additional topics discussed: artificial nutrition    Conversation Outcomes / Follow-Up Plan:   ACP complete - no further action today  Reviewed DNR/DNI and patient elects Full Code (Attempt Resuscitation)     Length of Voluntary ACP Conversation in minutes:  16 minutes    Serafin Alston MD

## 2022-10-11 NOTE — PROGRESS NOTES
HPI     Anika Odom is here for routine Medicare wellness/advanced care planning visit. She is getting ready to have dental work, and would like to know what to do with her Forteo around that time. I advised her I would speak with her rheumatologist to have that clarified. No new family history, no new issues. Past Medical History:   Diagnosis Date    Arthritis     Rheumatoid arthritis    Ganglion 10/2019    Glaucoma     Hypertension     Osteoporosis     Rheumatoid arthritis ([facility] [address]     Dr. Anshu Tai. Cimzia infusions monthly        Past Surgical History:   Procedure Laterality Date    HX ANKLE FRACTURE TX Right 09/30/2020    HX CARPAL TUNNEL RELEASE Left 2017    HX CARPAL TUNNEL RELEASE Right 2016    HX CHOLECYSTECTOMY  1993        Current Outpatient Medications   Medication Sig Dispense Refill    lisinopril-hydroCHLOROthiazide (PRINZIDE, ZESTORETIC) 20-12.5 mg per tablet TAKE 1 TABLET BY MOUTH EVERY DAY 90 Tablet 3    amLODIPine (NORVASC) 5 mg tablet Take 1 Tablet by mouth daily. 90 Tablet 3    abatacept (Orencia ClickJect) 379 mg/mL atIn 1 ml      dorzolamide-timoloL (COSOPT) 22.3-6.8 mg/mL ophthalmic solution 1 drop into affected eye      latanoprost (XALATAN) 0.005 % ophthalmic solution Administer 1 Drop to both eyes nightly.  multivitamin, tx-iron-ca-min (THERA-M w/ IRON) 9 mg iron-400 mcg tab tablet Take 1 Tab by mouth daily.  cholecalciferol (VITAMIN D3) 25 mcg (1,000 unit) cap Take  by mouth daily.       amoxicillin (AMOXIL) 500 mg capsule 4 tabs by mouth 1 hour prior to appointment (Patient not taking: Reported on 10/11/2022) 4 Capsule 3    varicella-zoster recombinant, PF, (Shingrix, PF,) 50 mcg/0.5 mL susr injection 0.5mL by IntraMUSCular route once now and then repeat in 2-6 months (Patient not taking: Reported on 10/11/2022) 0.5 mL 1        No Known Allergies     Social History     Socioeconomic History    Marital status:      Spouse name: Not on file    Number of children: Not on file    Years of education: Not on file    Highest education level: Not on file   Occupational History    Not on file   Tobacco Use    Smoking status: Former     Packs/day: 0.50     Years: 30.00     Pack years: 15.00     Types: Cigarettes     Start date:      Quit date:      Years since quittin.7    Smokeless tobacco: Never   Substance and Sexual Activity    Alcohol use: Never     Comment: 3 or 4 weekly    Drug use: Never    Sexual activity: Not on file   Other Topics Concern    Not on file   Social History Narrative    Not on file     Social Determinants of Health     Financial Resource Strain: Not on file   Food Insecurity: Not on file   Transportation Needs: Not on file   Physical Activity: Not on file   Stress: Not on file   Social Connections: Not on file   Intimate Partner Violence: Not on file   Housing Stability: Not on file        Family History   Problem Relation Age of Onset    Heart Disease Mother     OSTEOARTHRITIS Father     Liver Disease Father     Diabetes Sister     Cancer Maternal Aunt     Cancer Maternal Grandmother     Ovarian Cancer Maternal Aunt     Cancer Maternal Aunt         lung and colon           Visit Vitals  /70 (BP 1 Location: Left arm, BP Patient Position: Sitting, BP Cuff Size: Large adult)   Pulse 89   Temp 97.8 °F (36.6 °C) (Temporal)   Resp 18   Ht 5' 5\" (1.651 m)   Wt 176 lb 9.6 oz (80.1 kg)   SpO2 98%   BMI 29.39 kg/m²        Review of Systems   Respiratory:  Negative for shortness of breath. Cardiovascular:  Negative for chest pain. Gastrointestinal:  Negative for blood in stool. Genitourinary:  Negative for hematuria. No vaginal bleeding. Psychiatric/Behavioral:  Negative for depression. The patient is not nervous/anxious. Physical Exam  Constitutional:       General: She is not in acute distress.   HENT:      Right Ear: Tympanic membrane, ear canal and external ear normal.      Left Ear: Tympanic membrane, ear canal and external ear normal.   Eyes:      General: No scleral icterus. Conjunctiva/sclera: Conjunctivae normal.   Neck:      Comments: Bilateral carotid upstrokes normal to palpation. Lymph: No cervical or supraclavicular lymphadenopathy. Cardiovascular:      Rate and Rhythm: Normal rate and regular rhythm. Pulses: Normal pulses. Heart sounds: No friction rub. No gallop. Comments: Soft systolic murmur. Pulmonary:      Effort: Pulmonary effort is normal.      Breath sounds: Normal breath sounds. Abdominal:      General: Abdomen is flat. Palpations: Abdomen is soft. Tenderness: There is no abdominal tenderness. Musculoskeletal:      Cervical back: Neck supple. Comments: No clubbing, cyanosis, or edema. Calves nontender, no cords. Skin:     General: Skin is warm and dry. Neurological:      Mental Status: She is alert and oriented to person, place, and time. Psychiatric:         Mood and Affect: Mood normal.                Diagnoses and all orders for this visit:    1. Medicare annual wellness visit, subsequent  Comments:  Normal breast exam last visit, up-to-date mammogram and colonoscopy    2. Advanced directives, counseling/discussion  -     ADVANCE CARE PLANNING FIRST 30 MINS    3. Essential (primary) hypertension  Comments:  Controlled , blood pressure meds refilled. Orders:  -     lisinopril-hydroCHLOROthiazide (PRINZIDE, ZESTORETIC) 20-12.5 mg per tablet; TAKE 1 TABLET BY MOUTH EVERY DAY  -     amLODIPine (NORVASC) 5 mg tablet; Take 1 Tablet by mouth daily. 4. Rheumatoid arthritis, involving unspecified site, unspecified whether rheumatoid factor present ([facility] [address]  Comments:  On Orencia, sees rheumatology regularly. 5. Osteoporosis, postmenopausal  Comments:  advised her that I would discuss with her rheumatologist how to handle her Forteo around her dental work. Follow-up and Dispositions    Return for bp.               Leslie Pérez Ryland Khan MD   This is the Subsequent Medicare Annual Wellness Exam, performed 12 months or more after the Initial AWV or the last Subsequent AWV    I have reviewed the patient's medical history in detail and updated the computerized patient record. Assessment/Plan   Education and counseling provided:  Are appropriate based on today's review and evaluation    1. Advanced directives, counseling/discussion  2. Rheumatoid arthritis, involving unspecified site, unspecified whether rheumatoid factor present ([facility] [address]  3. Medicare annual wellness visit, subsequent       Depression Risk Factor Screening     3 most recent PHQ Screens 4/11/2022   PHQ Not Done -   Little interest or pleasure in doing things Not at all   Feeling down, depressed, irritable, or hopeless Not at all   Total Score PHQ 2 0       Alcohol & Drug Abuse Risk Screen    Do you average more than 1 drink per night or more than 7 drinks a week:  No    On any one occasion in the past three months have you have had more than 3 drinks containing alcohol:  No          Functional Ability and Level of Safety    Hearing: Hearing is good. Activities of Daily Living: The home contains: handrails  Patient does total self care      Ambulation: with difficulty, uses a cane     Fall Risk:  Fall Risk Assessment, last 12 mths 4/11/2022   Able to walk? Yes   Fall in past 12 months? 0   Do you feel unsteady? 0   Are you worried about falling 0   Is TUG test greater than 12 seconds? -   Is the gait abnormal? -   Number of falls in past 12 months -   Fall with injury?  -      Abuse Screen:  Patient is not abused       Cognitive Screening    Has your family/caregiver stated any concerns about your memory: no     Cognitive Screening: Normal - Mini Cog Test    Health Maintenance Due     Health Maintenance Due   Topic Date Due    DTaP/Tdap/Td series (1 - Tdap) Never done    Shingrix Vaccine Age 50> (1 of 2) Never done    COVID-19 Vaccine (4 - Booster for Forbes Peter series) 04/06/2022    Flu Vaccine (1) 08/01/2022    Medicare Yearly Exam  10/08/2022       Patient Care Team   Patient Care Team:  Allegra Gottlieb MD as PCP - General (Internal Medicine Physician)  Allegra Gottlieb [facility] PCP [facility]    History     Patient Active Problem List   Diagnosis Code    Rheumatoid arthritis ([facility] [address] M06.9    Hypertension I10    Osteoporosis M81.0     Past Medical History:   Diagnosis Date    Arthritis     Rheumatoid arthritis    Ganglion 10/2019    Glaucoma     Hypertension     Osteoporosis     Rheumatoid arthritis ([facility] [address]     Dr. Josiane Gonzales. Cimzia infusions monthly      Past Surgical History:   Procedure Laterality Date    HX ANKLE FRACTURE TX Right 09/30/2020    HX CARPAL TUNNEL RELEASE Left 2017    HX CARPAL TUNNEL RELEASE Right 2016    HX CHOLECYSTECTOMY  1993     Current Outpatient Medications   Medication Sig Dispense Refill    amoxicillin (AMOXIL) 500 mg capsule 4 tabs by mouth 1 hour prior to appointment 4 Capsule 3    abatacept (Orencia ClickJect) 642 mg/mL atIn 1 ml      dorzolamide-timoloL (COSOPT) 22.3-6.8 mg/mL ophthalmic solution 1 drop into affected eye      lisinopril-hydroCHLOROthiazide (PRINZIDE, ZESTORETIC) 20-12.5 mg per tablet TAKE 1 TABLET BY MOUTH EVERY DAY 90 Tablet 3    amLODIPine (NORVASC) 5 mg tablet Take 1 Tablet by mouth daily. 90 Tablet 3    varicella-zoster recombinant, PF, (Shingrix, PF,) 50 mcg/0.5 mL susr injection 0.5mL by IntraMUSCular route once now and then repeat in 2-6 months 0.5 mL 1    latanoprost (XALATAN) 0.005 % ophthalmic solution Administer 1 Drop to both eyes nightly.  multivitamin, tx-iron-ca-min (THERA-M w/ IRON) 9 mg iron-400 mcg tab tablet Take 1 Tab by mouth daily.  cholecalciferol (VITAMIN D3) 25 mcg (1,000 unit) cap Take  by mouth daily.        No Known Allergies    Family History   Problem Relation Age of Onset    Heart Disease Mother     OSTEOARTHRITIS Father     Liver Disease Father    Mumtaz James Diabetes Sister     Cancer Maternal Aunt     Cancer Maternal Grandmother     Ovarian Cancer Maternal Aunt     Cancer Maternal Aunt         lung and colon     Social History     Tobacco Use    Smoking status: Former     Packs/day: 0.50     Years: 30.00     Pack years: 15.00     Types: Cigarettes     Start date:      Quit date:      Years since quittin.7    Smokeless tobacco: Never   Substance Use Topics    Alcohol use: Never     Comment: 3 or 4 weekly         Kaylen Mac MD

## 2022-10-11 NOTE — PATIENT INSTRUCTIONS

## 2022-12-02 ENCOUNTER — OFFICE VISIT (OUTPATIENT)
Dept: ORTHOPEDIC SURGERY | Age: 67
End: 2022-12-02
Payer: MEDICARE

## 2022-12-02 VITALS — BODY MASS INDEX: 30.39 KG/M2 | HEIGHT: 64 IN | WEIGHT: 178 LBS

## 2022-12-02 DIAGNOSIS — M81.0 POST-MENOPAUSAL OSTEOPOROSIS: ICD-10-CM

## 2022-12-02 DIAGNOSIS — M16.11 PRIMARY OSTEOARTHRITIS OF RIGHT HIP: Primary | ICD-10-CM

## 2022-12-02 NOTE — PROGRESS NOTES
Patient: Nicolasa Mcfarland                MRN: [id number]       SSN: [id number]  YOB: 1955        AGE: 79 y.o. SEX: female  Body mass index is 30.55 kg/m². PCP: Bianca Lynne MD  12/02/22    Olegario Silence today for reevaluation of right hip pain is moderate some days are worse than others she can tell when the weather is changing she is on Forteo her T score was -3.9 she started it in June we will plan on repeating the DEXA scan in February or March and hopefully surgery for the hip in June or July which will give her about 18 months or so on the medication itself    The examination today left hip rotates well the right hip is moderately stiff with only about 10 degrees of internal rotation calf is nontender [place] Ream' sign is negative. AP pelvis was obtained today 12/2/2022 which confirm severe end-stage arthritis involving the right hip with osteoporosis and a type C femur    We did discuss fixation surgical approach and I think she will do very well with surgery    Risk and benefits involving total hip replacement were described including but not limited to infection DVT pulmonary embolism anesthetic complications blood loss requiring transfusion dislocation leg length discrepancy limp fracture chronic pain bursitis to her back in late January and reevaluate likely DEXA scan at that point thank you    REVIEW OF SYSTEMS:      CON: negative  EYE: negative   ENT: negative  RESP: negative  GI:    negative   :  negative  MSK: Positive  A twelve point review of systems was completed, positives noted and all other systems were reviewed and are negative          Past Medical History:   Diagnosis Date    Arthritis     Rheumatoid arthritis    Ganglion 10/2019    Glaucoma     Hypertension     Osteoporosis     Rheumatoid arthritis ([facility] [address]     Dr. Ebonie Ramirez.   Cimzia infusions monthly       Family History   Problem Relation Age of Onset    Heart Disease Mother     OSTEOARTHRITIS Father     Liver Disease Father     Diabetes Sister     Cancer Maternal Aunt     Cancer Maternal Grandmother     Ovarian Cancer Maternal Aunt     Cancer Maternal Aunt         lung and colon       Current Outpatient Medications   Medication Sig Dispense Refill    lisinopril-hydroCHLOROthiazide (PRINZIDE, ZESTORETIC) 20-12.5 mg per tablet TAKE 1 TABLET BY MOUTH EVERY DAY 90 Tablet 3    amLODIPine (NORVASC) 5 mg tablet Take 1 Tablet by mouth daily. 90 Tablet 3    amoxicillin (AMOXIL) 500 mg capsule 4 tabs by mouth 1 hour prior to appointment 4 Capsule 3    abatacept (Orencia ClickJect) 964 mg/mL atIn 1 ml      dorzolamide-timoloL (COSOPT) 22.3-6.8 mg/mL ophthalmic solution 1 drop into affected eye      latanoprost (XALATAN) 0.005 % ophthalmic solution Administer 1 Drop to both eyes nightly. multivitamin, tx-iron-ca-min (THERA-M w/ IRON) 9 mg iron-400 mcg tab tablet Take 1 Tab by mouth daily. cholecalciferol (VITAMIN D3) 25 mcg (1,000 unit) cap Take  by mouth daily.       varicella-zoster recombinant, PF, (Shingrix, PF,) 50 mcg/0.5 mL susr injection 0.5mL by IntraMUSCular route once now and then repeat in 2-6 months (Patient not taking: No sig reported) 0.5 mL 1       No Known Allergies    Past Surgical History:   Procedure Laterality Date    HX ANKLE FRACTURE TX Right 2020    HX CARPAL TUNNEL RELEASE Left 2017    HX CARPAL TUNNEL RELEASE Right 2016    HX CHOLECYSTECTOMY  1993       Social History     Socioeconomic History    Marital status:      Spouse name: Not on file    Number of children: Not on file    Years of education: Not on file    Highest education level: Not on file   Occupational History    Not on file   Tobacco Use    Smoking status: Former     Packs/day: 0.50     Years: 30.00     Pack years: 15.00     Types: Cigarettes     Start date:      Quit date:      Years since quittin.9    Smokeless tobacco: Never   Substance and Sexual Activity    Alcohol use: Never     Comment: 3 or 4 weekly Drug use: Never    Sexual activity: Not on file   Other Topics Concern    Not on file   Social History Narrative    Not on file     Social Determinants of Health     Financial Resource Strain: Not on file   Food Insecurity: Not on file   Transportation Needs: Not on file   Physical Activity: Not on file   Stress: Not on file   Social Connections: Not on file   Intimate Partner Violence: Not on file   Housing Stability: Not on file       Visit Vitals  Ht 5' 4\" (1.626 m)   Wt 80.7 kg (178 lb)   BMI 30.55 kg/m²         PHYSICAL EXAMINATION:  GENERAL: Alert and oriented x3, in no acute distress, well-developed, well-nourished, afebrile. HEART: No JVD. EYES: No scleral icterus   NECK: No significant lymphadenopathy   LUNGS: No respiratory compromise or indrawing  ABDOMEN: Soft, non-tender, non-distended. Note: This note was completed using voice recognition software. Any typographical/name errors or mistakes are unintentional.    Electronically signed by:  Yanick Portillo MD

## 2023-01-27 ENCOUNTER — OFFICE VISIT (OUTPATIENT)
Dept: ORTHOPEDIC SURGERY | Age: 68
End: 2023-01-27
Payer: MEDICARE

## 2023-01-27 VITALS — TEMPERATURE: 97.8 F | RESPIRATION RATE: 16 BRPM | HEIGHT: 64 IN | BODY MASS INDEX: 28.85 KG/M2 | WEIGHT: 169 LBS

## 2023-01-27 DIAGNOSIS — M70.71 BURSITIS OF OTHER BURSA OF RIGHT HIP: ICD-10-CM

## 2023-01-27 DIAGNOSIS — M16.11 PRIMARY OSTEOARTHRITIS OF RIGHT HIP: ICD-10-CM

## 2023-01-27 DIAGNOSIS — M06.9 RHEUMATOID ARTHRITIS, INVOLVING UNSPECIFIED SITE, UNSPECIFIED WHETHER RHEUMATOID FACTOR PRESENT (HCC): ICD-10-CM

## 2023-01-27 DIAGNOSIS — M81.0 POST-MENOPAUSAL OSTEOPOROSIS: Primary | ICD-10-CM

## 2023-01-27 NOTE — PROGRESS NOTES
Patient: Caitlyn Barbour                MRN: [id number]       SSN: [id number]  YOB: 1955        AGE: 76 y.o. SEX: female  Body mass index is 29.01 kg/m². PCP: Radha Camp MD  01/27/23      Caitlyn Barbour presents today for right hip pain. She would like to discuss replacement at today's visit. She has a PMHx of osteoporosis. Her last DEXA was completed in 2020 and I will order a new one today. She started Forteo in June 2022. Ms. Bob Freitas is here with her sister today and using her cane. She states the pain is severe and is in both her groin and on her right side when she rolls over at night. She states that she has to use the cart at the grocery store. She reports stiffness after sitting for long periods of time. We discussed surgery in June or July so that she can have a full year on the Forteo. We also discussed a bursectomy during the right hip replacement. We also discussed surgery at length, to include complications and recovery.      The examination today she walks with an antalgic gait for especially the first few steps which does tend to smooth out a little bit she is using her cane well left hip rotates normally the right hip is a bit stiff as expected only about 5 degrees of internal rotation [place] [employer]' sign is negative calf nontender she is very pleasant she does worry about things to some degree as well she has been very good about being on Forteo    I like to repeat the bone density test the repeat x-ray of her hip today 1/27/2023 reveals that the arthritis is gotten to be very severe and we may end up having to do surgery little bit sooner there was a discussion regarding surgery off to see how soft the bone is we could consider hybrid fixation with a cemented femoral component depending on how soft the bone is at the time of surgery we also discussed other complications including intraoperative fracture and other issues which I do not expect any of these issues but can happen. I would recommend a bursectomy at the same time initially she wanted an intra-articular injection but it stressing her out so we will just avoid the intra-articular injection for the time being we will see her back we will review the results of the bone density test and we mid depending on her symptomatology we may have to move her surgery little bit sooner which I be very happy to accommodate her they are very nice family thank you    I personally reviewed the previous AP pelvis x-rays from December which confirm severe end-stage arthritis involving the right hip with osteoporosis and a type C femur    AP pelvis today, 1/27/23. PLAN:   - Discussion regarding surgery, decided that it is recommended at this time for the right hip  - Repeat DEXA scan   - Return after DEXA to review           REVIEW OF SYSTEMS:      CON: negative  EYE: negative   ENT: negative  RESP: negative  GI:    negative   :  negative  MSK: Positive  A twelve point review of systems was completed, positives noted and all other systems were reviewed and are negative          Past Medical History:   Diagnosis Date    Arthritis     Rheumatoid arthritis    Ganglion 10/2019    Glaucoma     Hypertension     Osteoporosis     Rheumatoid arthritis ([facility] [address]     Dr. Ezekiel Lee. Cimzia infusions monthly       Family History   Problem Relation Age of Onset    Heart Disease Mother     OSTEOARTHRITIS Father     Liver Disease Father     Diabetes Sister     Cancer Maternal Aunt     Cancer Maternal Grandmother     Ovarian Cancer Maternal Aunt     Cancer Maternal Aunt         lung and colon       Current Outpatient Medications   Medication Sig Dispense Refill    lisinopril-hydroCHLOROthiazide (PRINZIDE, ZESTORETIC) 20-12.5 mg per tablet TAKE 1 TABLET BY MOUTH EVERY DAY 90 Tablet 3    amLODIPine (NORVASC) 5 mg tablet Take 1 Tablet by mouth daily.  90 Tablet 3    amoxicillin (AMOXIL) 500 mg capsule 4 tabs by mouth 1 hour prior to appointment 4 Capsule 3    abatacept (Orencia ClickJect) 028 mg/mL atIn 1 ml      dorzolamide-timoloL (COSOPT) 22.3-6.8 mg/mL ophthalmic solution 1 drop into affected eye      varicella-zoster recombinant, PF, (Shingrix, PF,) 50 mcg/0.5 mL susr injection 0.5mL by IntraMUSCular route once now and then repeat in 2-6 months (Patient not taking: No sig reported) 0.5 mL 1    latanoprost (XALATAN) 0.005 % ophthalmic solution Administer 1 Drop to both eyes nightly. multivitamin, tx-iron-ca-min (THERA-M w/ IRON) 9 mg iron-400 mcg tab tablet Take 1 Tab by mouth daily. cholecalciferol (VITAMIN D3) 25 mcg (1,000 unit) cap Take  by mouth daily.          No Known Allergies    Past Surgical History:   Procedure Laterality Date    HX ANKLE FRACTURE TX Right 2020    HX CARPAL TUNNEL RELEASE Left 2017    HX CARPAL TUNNEL RELEASE Right 2016    HX CHOLECYSTECTOMY         Social History     Socioeconomic History    Marital status:      Spouse name: Not on file    Number of children: Not on file    Years of education: Not on file    Highest education level: Not on file   Occupational History    Not on file   Tobacco Use    Smoking status: Former     Packs/day: 0.50     Years: 30.00     Pack years: 15.00     Types: Cigarettes     Start date:      Quit date:      Years since quittin.0    Smokeless tobacco: Never   Substance and Sexual Activity    Alcohol use: Never     Comment: 3 or 4 weekly    Drug use: Never    Sexual activity: Not on file   Other Topics Concern    Not on file   Social History Narrative    Not on file     Social Determinants of Health     Financial Resource Strain: Not on file   Food Insecurity: Not on file   Transportation Needs: Not on file   Physical Activity: Not on file   Stress: Not on file   Social Connections: Not on file   Intimate Partner Violence: Not on file   Housing Stability: Not on file       Visit Vitals  Temp 97.8 °F (36.6 °C) (Temporal)   Resp 16   Ht 5' 4\" (1.626 m)   Wt 169 lb (76.7 kg)   BMI 29.01 kg/m²         PHYSICAL EXAMINATION:  GENERAL: Alert and oriented x3, in no acute distress, well-developed, well-nourished, afebrile. HEART: No JVD. EYES: No scleral icterus   NECK: No significant lymphadenopathy   LUNGS: No respiratory compromise or indrawing  ABDOMEN: Soft, non-tender, non-distended. Note: This note was completed using voice recognition software.  Any typographical/name errors or mistakes are unintentional.    Written by: Lopez Spencer, as dictated by Jody Khanna MD.    Electronically signed by: Linnette James

## 2023-01-31 DIAGNOSIS — K76.0 STEATOSIS OF LIVER: Primary | ICD-10-CM

## 2023-01-31 DIAGNOSIS — R79.89 ELEVATED LFTS: ICD-10-CM

## 2023-02-03 DIAGNOSIS — K76.0 STEATOSIS OF LIVER: Primary | ICD-10-CM

## 2023-02-03 DIAGNOSIS — R79.89 ELEVATED LFTS: ICD-10-CM

## 2023-02-08 ENCOUNTER — HOSPITAL ENCOUNTER (OUTPATIENT)
Dept: BONE DENSITY | Age: 68
Discharge: HOME | End: 2023-02-08
Attending: ORTHOPAEDIC SURGERY
Payer: MEDICARE

## 2023-02-08 DIAGNOSIS — M81.0 POST-MENOPAUSAL OSTEOPOROSIS: ICD-10-CM

## 2023-02-08 PROCEDURE — 77080 DXA BONE DENSITY AXIAL: CPT

## 2023-02-22 ENCOUNTER — TELEPHONE (OUTPATIENT)
Age: 68
End: 2023-02-22

## 2023-02-22 NOTE — TELEPHONE ENCOUNTER
Patient is requesting a call back from her doctors nurse. She had a bone density test done and says she hasn't heard anything yet. Her follow up appointment was scheduled for 4/28/23. Please contact patient at [phone].

## 2023-04-04 ENCOUNTER — OFFICE VISIT (OUTPATIENT)
Age: 68
End: 2023-04-04
Payer: MEDICARE

## 2023-04-04 VITALS
BODY MASS INDEX: 29.37 KG/M2 | RESPIRATION RATE: 18 BRPM | TEMPERATURE: 97 F | HEIGHT: 64 IN | DIASTOLIC BLOOD PRESSURE: 71 MMHG | SYSTOLIC BLOOD PRESSURE: 116 MMHG | OXYGEN SATURATION: 99 % | HEART RATE: 87 BPM | WEIGHT: 172 LBS

## 2023-04-04 DIAGNOSIS — I10 ESSENTIAL (PRIMARY) HYPERTENSION: Primary | ICD-10-CM

## 2023-04-04 PROCEDURE — 3074F SYST BP LT 130 MM HG: CPT | Performed by: INTERNAL MEDICINE

## 2023-04-04 PROCEDURE — G8427 DOCREV CUR MEDS BY ELIG CLIN: HCPCS | Performed by: INTERNAL MEDICINE

## 2023-04-04 PROCEDURE — 99213 OFFICE O/P EST LOW 20 MIN: CPT | Performed by: INTERNAL MEDICINE

## 2023-04-04 PROCEDURE — G8417 CALC BMI ABV UP PARAM F/U: HCPCS | Performed by: INTERNAL MEDICINE

## 2023-04-04 PROCEDURE — 1036F TOBACCO NON-USER: CPT | Performed by: INTERNAL MEDICINE

## 2023-04-04 PROCEDURE — G8399 PT W/DXA RESULTS DOCUMENT: HCPCS | Performed by: INTERNAL MEDICINE

## 2023-04-04 PROCEDURE — 3017F COLORECTAL CA SCREEN DOC REV: CPT | Performed by: INTERNAL MEDICINE

## 2023-04-04 PROCEDURE — 1123F ACP DISCUSS/DSCN MKR DOCD: CPT | Performed by: INTERNAL MEDICINE

## 2023-04-04 PROCEDURE — 3078F DIAST BP <80 MM HG: CPT | Performed by: INTERNAL MEDICINE

## 2023-04-04 PROCEDURE — 99211 OFF/OP EST MAY X REQ PHY/QHP: CPT | Performed by: INTERNAL MEDICINE

## 2023-04-04 PROCEDURE — 1090F PRES/ABSN URINE INCON ASSESS: CPT | Performed by: INTERNAL MEDICINE

## 2023-04-04 RX ORDER — PREDNISONE 5 MG/1
TABLET ORAL
COMMUNITY
Start: 2023-04-03

## 2023-04-04 RX ORDER — AMLODIPINE BESYLATE 5 MG/1
5 TABLET ORAL DAILY
Qty: 90 TABLET | Refills: 3 | Status: SHIPPED | OUTPATIENT
Start: 2023-04-04

## 2023-04-04 RX ORDER — METRONIDAZOLE 250 MG/1
TABLET ORAL
COMMUNITY
Start: 2023-02-20

## 2023-04-04 RX ORDER — LISINOPRIL AND HYDROCHLOROTHIAZIDE 20; 12.5 MG/1; MG/1
1 TABLET ORAL DAILY
Qty: 90 TABLET | Refills: 3 | Status: SHIPPED | OUTPATIENT
Start: 2023-04-04

## 2023-04-04 ASSESSMENT — PATIENT HEALTH QUESTIONNAIRE - PHQ9
2. FEELING DOWN, DEPRESSED OR HOPELESS: 0
1. LITTLE INTEREST OR PLEASURE IN DOING THINGS: 0
SUM OF ALL RESPONSES TO PHQ QUESTIONS 1-9: 0
SUM OF ALL RESPONSES TO PHQ9 QUESTIONS 1 & 2: 0
SUM OF ALL RESPONSES TO PHQ QUESTIONS 1-9: 0

## 2023-04-04 ASSESSMENT — ENCOUNTER SYMPTOMS
BLOOD IN STOOL: 0
SHORTNESS OF BREATH: 0

## 2023-04-05 NOTE — PROGRESS NOTES
Rafia Degroot presents today for No chief complaint on file. 1. \"Have you been to the ER, urgent care clinic since your last visit? Hospitalized since your last visit? \" no    2. \"Have you seen or consulted any other health care providers outside of the [address] [place] [address] since your last visit? \" no     3. For patients aged 39-70: Has the patient had a colonoscopy / FIT/ Cologuard? Yes - no Care Gap present      If the patient is female:    4. For patients aged 41-77: Has the patient had a mammogram within the past 2 years? Yes - no Care Gap present      5. For patients aged 21-65: Has the patient had a pap smear?  NA - based on age or sex
level: Not on file   Occupational History    Not on file   Tobacco Use    Smoking status: Former     Packs/day: 0.50     Types: Cigarettes     Start date: 1973     Quit date: 2003     Years since quittin.2    Smokeless tobacco: Never   Substance and Sexual Activity    Alcohol use: Never    Drug use: Never    Sexual activity: Not on file   Other Topics Concern    Not on file   Social History Narrative    Not on file     Social Determinants of Health     Financial Resource Strain: Not on file   Food Insecurity: Not on file   Transportation Needs: Not on file   Physical Activity: Not on file   Stress: Not on file   Social Connections: Not on file   Intimate Partner Violence: Not on file   Housing Stability: Not on file        Family History   Problem Relation Age of Onset    Heart Disease Mother     Osteoarthritis Father     Cancer Maternal Aunt         lung and colon    Liver Disease Father     Diabetes Sister     Cancer Maternal Aunt     Ovarian Cancer Maternal Aunt     Cancer Maternal Grandmother            /71   Pulse 87   Temp 97 °F (36.1 °C) (Temporal)   Resp 18   Ht 5' 4\" (1.626 m)   Wt 172 lb (78 kg)   SpO2 99%   BMI 29.52 kg/m²      Review of Systems   Eyes:  Negative for visual disturbance. Respiratory:  Negative for shortness of breath. Cardiovascular:  Negative for chest pain. Gastrointestinal:  Negative for blood in stool. Genitourinary:  Negative for hematuria and vaginal bleeding. Musculoskeletal:  Positive for arthralgias. Neurological:  Negative for headaches. Psychiatric/Behavioral:  Negative for dysphoric mood. The patient is not nervous/anxious. Physical Exam  Constitutional:       Appearance: She is not ill-appearing. Comments: Some discomfort walking and getting on and off the exam table. Eyes:      General: No scleral icterus. Conjunctiva/sclera: Conjunctivae normal.   Neck:      Comments: Carotid upstrokes normal to palpation.   No

## 2023-04-11 LAB — LDL CHOLESTEROL, EXTERNAL: 166

## 2023-04-20 ENCOUNTER — TELEPHONE (OUTPATIENT)
Age: 68
End: 2023-04-20

## 2023-04-20 NOTE — TELEPHONE ENCOUNTER
I received the results of her cholesterol checked from Dr. Dany Benjamin. Her total cholesterol is 233. I calculated [place] risk of her having a heart attack before she turns 78 and it was low at 6%.   I am okay with not changing anything, but recommend checking a cholesterol yearly

## 2023-04-28 ENCOUNTER — OFFICE VISIT (OUTPATIENT)
Age: 68
End: 2023-04-28

## 2023-04-28 VITALS — HEIGHT: 64 IN | WEIGHT: 174 LBS | BODY MASS INDEX: 29.71 KG/M2

## 2023-04-28 DIAGNOSIS — M16.11 PRIMARY OSTEOARTHRITIS OF RIGHT HIP: ICD-10-CM

## 2023-04-28 DIAGNOSIS — M85.80 OSTEOPENIA, UNSPECIFIED LOCATION: Primary | ICD-10-CM

## 2023-04-28 NOTE — PROGRESS NOTES
Patient: Justina Patrick                MRN: [id number]       SSN:   YOB: 1955        AGE: 76 y.o. SEX: female  Body mass index is 29.87 kg/m². PCP: Nidhi Salinas MD  04/28/23    Jimena Amaya returns reevaluation of right-sided hip pain follow-up for a DEXA scan she has been on Forteo for almost a year and it is really made of a dramatic improvement her T score is -2-2.1 and we did examine her today she is having moderate pain she is using her cane she does have evidence of bursitis as well laterally the hip is quite stiff on the right side within about 5 degrees of internal rotation left hip rotates nicely. Both feet warm and well-perfused no cyanosis or clubbing I am we did again discuss risks and benefits with regards to her total hip replacement and she might be able to go home same day as well. We did discuss complications    Risk and benefits involving total hip replacement were described including but not limited to infection DVT pulmonary embolism anesthetic complications blood loss requiring transfusion dislocation leg length discrepancy limp fracture chronic pain bursitis      I am looking forward to replacing her hip and I will leave the Forteo administration up to Dr. Jn Carter and I explained that sometimes we will continue the therapy for 12 to 18 months and then take a break. It is of my opinion on her decision that she is now safe and appropriate for surgery based on bone quality thank you              I have personally reviewed the previous AP pelvis x-rays, obtained 1/27/23. Shows end-stage arthritis of the right hip. I have personally reviewed the DEXA scan, obtained 02/08/23. Largest negative T-score is -2.1, which indicates osteopenia.     REVIEW OF SYSTEMS:      CON: negative  EYE: negative   ENT: negative  RESP: negative  GI:    negative   :  negative  MSK: Positive  A twelve point review of systems was completed, positives noted and all other

## 2023-05-08 ENCOUNTER — TELEPHONE (OUTPATIENT)
Age: 68
End: 2023-05-08

## 2023-05-08 NOTE — TELEPHONE ENCOUNTER
I just spoke with pt. Stated it is better now. The bump is located just outside of uterus. She is most concerned about nothing interfering with surgery scheduled for 06/05/23. Stated she has pre opt with you scheduled for 05/19/23    She also wanted you to know she still has not seen a GYN.  Stated she will take care of that after she gets passed this surgery     Pharmacy is [facility] [address].

## 2023-05-08 NOTE — TELEPHONE ENCOUNTER
If it start filling up, apply moist heat to the area 3 or 4 times daily, I will look at the area at her preop appt

## 2023-05-08 NOTE — TELEPHONE ENCOUNTER
----- Message from [address] sent at 5/8/2023 10:22 AM EDT -----  Subject: Appointment Request    Reason for Call: Established Patient Appointment needed: Semi-Routine Skin   Problem    QUESTIONS    Reason for appointment request? No appointments available during search     Additional Information for Provider? Patient has painful boil that burst   on 5/5.  Requests appointment asap.   ---------------------------------------------------------------------------  --------------  [address]  [phone]; OK to leave message on voicemail  ---------------------------------------------------------------------------  --------------  SCRIPT ANSWERS  COVID Screen: Thelma Sorto

## 2023-05-09 NOTE — TELEPHONE ENCOUNTER
MD's note relayed to patient. Patient stated that it burst. Patient denied NVD, fever edema and drainage (pus).

## 2023-05-15 DIAGNOSIS — M17.11 PRIMARY OSTEOARTHRITIS OF RIGHT KNEE: ICD-10-CM

## 2023-05-15 DIAGNOSIS — Z01.818 PRE-OP TESTING: Primary | ICD-10-CM

## 2023-05-16 ENCOUNTER — HOSPITAL ENCOUNTER (OUTPATIENT)
Facility: HOSPITAL | Age: 68
Discharge: HOME | End: 2023-05-19
Payer: MEDICARE

## 2023-05-16 DIAGNOSIS — M17.11 PRIMARY OSTEOARTHRITIS OF RIGHT KNEE: ICD-10-CM

## 2023-05-16 DIAGNOSIS — Z01.818 PRE-OP TESTING: ICD-10-CM

## 2023-05-16 LAB
ALBUMIN SERPL-MCNC: 4 G/DL (ref 3.4–5)
ANION GAP SERPL CALC-SCNC: 8 MMOL/L (ref 3–18)
APPEARANCE UR: CLEAR
APTT PPP: 28.8 SEC (ref 23–36.4)
BACTERIA URNS QL MICRO: ABNORMAL /HPF
BASOPHILS # BLD: 0 K/UL (ref 0–0.1)
BASOPHILS NFR BLD: 1 % (ref 0–2)
BILIRUB UR QL: NEGATIVE
BUN SERPL-MCNC: 13 MG/DL (ref 7–18)
BUN/CREAT SERPL: 25 (ref 12–20)
CALCIUM SERPL-MCNC: 10.6 MG/DL (ref 8.5–10.1)
CHLORIDE SERPL-SCNC: 100 MMOL/L (ref 100–111)
CO2 SERPL-SCNC: 27 MMOL/L (ref 21–32)
COLOR UR: YELLOW
CREAT SERPL-MCNC: 0.51 MG/DL (ref 0.6–1.3)
DIFFERENTIAL METHOD BLD: ABNORMAL
EKG ATRIAL RATE: 68 BPM
EKG DIAGNOSIS: NORMAL
EKG P AXIS: 6 DEGREES
EKG P-R INTERVAL: 156 MS
EKG Q-T INTERVAL: 388 MS
EKG QRS DURATION: 68 MS
EKG QTC CALCULATION (BAZETT): 412 MS
EKG R AXIS: 13 DEGREES
EKG T AXIS: 28 DEGREES
EKG VENTRICULAR RATE: 68 BPM
EOSINOPHIL # BLD: 0.2 K/UL (ref 0–0.4)
EOSINOPHIL NFR BLD: 2 % (ref 0–5)
EPITH CASTS URNS QL MICRO: ABNORMAL /LPF (ref 0–5)
ERYTHROCYTE [DISTWIDTH] IN BLOOD BY AUTOMATED COUNT: 13.9 % (ref 11.6–14.5)
EST. AVERAGE GLUCOSE BLD GHB EST-MCNC: 97 MG/DL
GLUCOSE SERPL-MCNC: 97 MG/DL (ref 74–99)
GLUCOSE UR STRIP.AUTO-MCNC: NEGATIVE MG/DL
HBA1C MFR BLD: 5 % (ref 4.2–5.6)
HCT VFR BLD AUTO: 43 % (ref 35–45)
HGB BLD-MCNC: 13.4 G/DL (ref 12–16)
HGB UR QL STRIP: NEGATIVE
IMM GRANULOCYTES # BLD AUTO: 0 K/UL (ref 0–0.04)
IMM GRANULOCYTES NFR BLD AUTO: 1 % (ref 0–0.5)
INR PPP: 1 (ref 0.8–1.2)
KETONES UR QL STRIP.AUTO: NEGATIVE MG/DL
LEUKOCYTE ESTERASE UR QL STRIP.AUTO: ABNORMAL
LYMPHOCYTES # BLD: 2.9 K/UL (ref 0.9–3.6)
LYMPHOCYTES NFR BLD: 33 % (ref 21–52)
MCH RBC QN AUTO: 30.7 PG (ref 24–34)
MCHC RBC AUTO-ENTMCNC: 31.2 G/DL (ref 31–37)
MCV RBC AUTO: 98.4 FL (ref 78–100)
MONOCYTES # BLD: 0.8 K/UL (ref 0.05–1.2)
MONOCYTES NFR BLD: 9 % (ref 3–10)
NEUTS SEG # BLD: 4.8 K/UL (ref 1.8–8)
NEUTS SEG NFR BLD: 55 % (ref 40–73)
NITRITE UR QL STRIP.AUTO: NEGATIVE
NRBC # BLD: 0 K/UL (ref 0–0.01)
NRBC BLD-RTO: 0 PER 100 WBC
PH UR STRIP: 7 (ref 5–8)
PLATELET # BLD AUTO: 288 K/UL (ref 135–420)
PMV BLD AUTO: 12.1 FL (ref 9.2–11.8)
POTASSIUM SERPL-SCNC: 5 MMOL/L (ref 3.5–5.5)
PROT UR STRIP-MCNC: NEGATIVE MG/DL
PROTHROMBIN TIME: 13.3 SEC (ref 11.5–15.2)
RBC # BLD AUTO: 4.37 M/UL (ref 4.2–5.3)
RBC #/AREA URNS HPF: ABNORMAL /HPF (ref 0–5)
SODIUM SERPL-SCNC: 135 MMOL/L (ref 136–145)
SP GR UR REFRACTOMETRY: 1.01 (ref 1–1.03)
UROBILINOGEN UR QL STRIP.AUTO: 0.2 EU/DL (ref 0.2–1)
WBC # BLD AUTO: 8.7 K/UL (ref 4.6–13.2)
WBC URNS QL MICRO: ABNORMAL /HPF (ref 0–4)

## 2023-05-16 PROCEDURE — 82040 ASSAY OF SERUM ALBUMIN: CPT

## 2023-05-16 PROCEDURE — 85730 THROMBOPLASTIN TIME PARTIAL: CPT

## 2023-05-16 PROCEDURE — 80048 BASIC METABOLIC PNL TOTAL CA: CPT

## 2023-05-16 PROCEDURE — 85025 COMPLETE CBC W/AUTO DIFF WBC: CPT

## 2023-05-16 PROCEDURE — 83036 HEMOGLOBIN GLYCOSYLATED A1C: CPT

## 2023-05-16 PROCEDURE — 93005 ELECTROCARDIOGRAM TRACING: CPT

## 2023-05-16 PROCEDURE — 87086 URINE CULTURE/COLONY COUNT: CPT

## 2023-05-16 PROCEDURE — 36415 COLL VENOUS BLD VENIPUNCTURE: CPT

## 2023-05-16 PROCEDURE — 81001 URINALYSIS AUTO W/SCOPE: CPT

## 2023-05-16 PROCEDURE — 85610 PROTHROMBIN TIME: CPT

## 2023-05-17 LAB
BACTERIA SPEC CULT: NORMAL
SERVICE CMNT-IMP: NORMAL

## 2023-05-19 ENCOUNTER — OFFICE VISIT (OUTPATIENT)
Age: 68
End: 2023-05-19
Payer: MEDICARE

## 2023-05-19 VITALS
SYSTOLIC BLOOD PRESSURE: 116 MMHG | BODY MASS INDEX: 29.19 KG/M2 | OXYGEN SATURATION: 98 % | RESPIRATION RATE: 18 BRPM | HEIGHT: 64 IN | WEIGHT: 171 LBS | DIASTOLIC BLOOD PRESSURE: 68 MMHG | HEART RATE: 82 BPM | TEMPERATURE: 97.3 F

## 2023-05-19 DIAGNOSIS — Z01.818 PREOPERATIVE EVALUATION TO RULE OUT SURGICAL CONTRAINDICATION: Primary | ICD-10-CM

## 2023-05-19 PROCEDURE — 3017F COLORECTAL CA SCREEN DOC REV: CPT | Performed by: INTERNAL MEDICINE

## 2023-05-19 PROCEDURE — 3078F DIAST BP <80 MM HG: CPT | Performed by: INTERNAL MEDICINE

## 2023-05-19 PROCEDURE — 1123F ACP DISCUSS/DSCN MKR DOCD: CPT | Performed by: INTERNAL MEDICINE

## 2023-05-19 PROCEDURE — 1036F TOBACCO NON-USER: CPT | Performed by: INTERNAL MEDICINE

## 2023-05-19 PROCEDURE — 99213 OFFICE O/P EST LOW 20 MIN: CPT | Performed by: INTERNAL MEDICINE

## 2023-05-19 PROCEDURE — G8427 DOCREV CUR MEDS BY ELIG CLIN: HCPCS | Performed by: INTERNAL MEDICINE

## 2023-05-19 PROCEDURE — G8399 PT W/DXA RESULTS DOCUMENT: HCPCS | Performed by: INTERNAL MEDICINE

## 2023-05-19 PROCEDURE — 3074F SYST BP LT 130 MM HG: CPT | Performed by: INTERNAL MEDICINE

## 2023-05-19 PROCEDURE — 1090F PRES/ABSN URINE INCON ASSESS: CPT | Performed by: INTERNAL MEDICINE

## 2023-05-19 PROCEDURE — G8417 CALC BMI ABV UP PARAM F/U: HCPCS | Performed by: INTERNAL MEDICINE

## 2023-05-19 RX ORDER — LETROZOLE 2.5 MG/1
2.5 TABLET, FILM COATED ORAL DAILY
COMMUNITY
End: 2023-05-19 | Stop reason: CLARIF

## 2023-05-19 ASSESSMENT — PATIENT HEALTH QUESTIONNAIRE - PHQ9
2. FEELING DOWN, DEPRESSED OR HOPELESS: 0
SUM OF ALL RESPONSES TO PHQ QUESTIONS 1-9: 0
SUM OF ALL RESPONSES TO PHQ9 QUESTIONS 1 & 2: 0
1. LITTLE INTEREST OR PLEASURE IN DOING THINGS: 0

## 2023-05-19 ASSESSMENT — ENCOUNTER SYMPTOMS: SHORTNESS OF BREATH: 0

## 2023-05-20 NOTE — PROGRESS NOTES
Lisa Castillo presents today for No chief complaint on file. 1. \"Have you been to the ER, urgent care clinic since your last visit? Hospitalized since your last visit? \" no    2. \"Have you seen or consulted any other health care providers outside of the [address] [place] [address] since your last visit? \" no     3. For patients aged 39-70: Has the patient had a colonoscopy / FIT/ Cologuard? Yes - no Care Gap present      If the patient is female:    4. For patients aged 41-77: Has the patient had a mammogram within the past 2 years? Yes - no Care Gap present      5. For patients aged 21-65: Has the patient had a pap smear?  NA - based on age or sex
abdominal tenderness. Musculoskeletal:      Cervical back: Neck supple. Comments: No clubbing cyanosis, or edema, calves NT, no cords   Skin:     General: Skin is warm and dry. Neurological:      Mental Status: She is alert and oriented to person, place, and time. Psychiatric:         Mood and Affect: Mood normal.        I reviewed EKG and labs, all within normal limits. Yanick Morrison was seen today for pre-op exam.    Diagnoses and all orders for this visit:    Preoperative evaluation to rule out surgical contraindication         No follow-up provider specified.          Cinthya Wade MD

## 2023-05-22 ENCOUNTER — NURSE ONLY (OUTPATIENT)
Age: 68
End: 2023-05-22
Payer: MEDICARE

## 2023-05-22 DIAGNOSIS — M16.11 PRIMARY OSTEOARTHRITIS OF RIGHT HIP: Primary | ICD-10-CM

## 2023-05-22 PROCEDURE — 73502 X-RAY EXAM HIP UNI 2-3 VIEWS: CPT | Performed by: ORTHOPAEDIC SURGERY

## 2023-05-23 ENCOUNTER — TELEPHONE (OUTPATIENT)
Age: 68
End: 2023-05-23

## 2023-05-23 NOTE — TELEPHONE ENCOUNTER
Received a call from Dr. Candida White office in regards to the pre op note from 5/19/23. They can see it in Epic but it doesn't say anywhere in the note that she is cleared for surgery. They just need Dr. Favian Vega to document in her chart that she is cleared. Surgery is on 6/5/23 and they are aware that Dr. Favian Vega will be out of the office until Friday.

## 2023-05-24 ENCOUNTER — OFFICE VISIT (OUTPATIENT)
Age: 68
End: 2023-05-24
Payer: MEDICARE

## 2023-05-24 VITALS
BODY MASS INDEX: 29.64 KG/M2 | DIASTOLIC BLOOD PRESSURE: 74 MMHG | HEIGHT: 64 IN | SYSTOLIC BLOOD PRESSURE: 136 MMHG | TEMPERATURE: 97.5 F | WEIGHT: 173.6 LBS

## 2023-05-24 DIAGNOSIS — Z01.818 PRE-OP EXAM: Primary | ICD-10-CM

## 2023-05-24 DIAGNOSIS — M16.11 PRIMARY OSTEOARTHRITIS OF RIGHT HIP: ICD-10-CM

## 2023-05-24 PROCEDURE — G8417 CALC BMI ABV UP PARAM F/U: HCPCS | Performed by: PHYSICIAN ASSISTANT

## 2023-05-24 PROCEDURE — 1123F ACP DISCUSS/DSCN MKR DOCD: CPT | Performed by: PHYSICIAN ASSISTANT

## 2023-05-24 PROCEDURE — 3075F SYST BP GE 130 - 139MM HG: CPT | Performed by: PHYSICIAN ASSISTANT

## 2023-05-24 PROCEDURE — G8399 PT W/DXA RESULTS DOCUMENT: HCPCS | Performed by: PHYSICIAN ASSISTANT

## 2023-05-24 PROCEDURE — 3017F COLORECTAL CA SCREEN DOC REV: CPT | Performed by: PHYSICIAN ASSISTANT

## 2023-05-24 PROCEDURE — 3078F DIAST BP <80 MM HG: CPT | Performed by: PHYSICIAN ASSISTANT

## 2023-05-24 PROCEDURE — 1036F TOBACCO NON-USER: CPT | Performed by: PHYSICIAN ASSISTANT

## 2023-05-24 PROCEDURE — G8427 DOCREV CUR MEDS BY ELIG CLIN: HCPCS | Performed by: PHYSICIAN ASSISTANT

## 2023-05-24 PROCEDURE — 1090F PRES/ABSN URINE INCON ASSESS: CPT | Performed by: PHYSICIAN ASSISTANT

## 2023-05-24 PROCEDURE — 99214 OFFICE O/P EST MOD 30 MIN: CPT | Performed by: PHYSICIAN ASSISTANT

## 2023-05-24 RX ORDER — NITROFURANTOIN 25; 75 MG/1; MG/1
100 CAPSULE ORAL 2 TIMES DAILY
Qty: 20 CAPSULE | Refills: 0 | Status: SHIPPED | OUTPATIENT
Start: 2023-05-24 | End: 2023-06-03

## 2023-05-24 RX ORDER — DEXAMETHASONE SODIUM PHOSPHATE 4 MG/ML
8 INJECTION, SOLUTION INTRA-ARTICULAR; INTRALESIONAL; INTRAMUSCULAR; INTRAVENOUS; SOFT TISSUE
OUTPATIENT
Start: 2023-05-24 | End: 2023-05-24

## 2023-05-24 RX ORDER — PREGABALIN 25 MG/1
75 CAPSULE ORAL 2 TIMES DAILY
OUTPATIENT
Start: 2023-05-24

## 2023-05-24 RX ORDER — ACETAMINOPHEN 325 MG/1
1000 TABLET ORAL
OUTPATIENT
Start: 2023-05-24 | End: 2023-05-24

## 2023-05-24 NOTE — H&P
MG/ML SOAJ, 1 ml, Disp: , Rfl:     amoxicillin (AMOXIL) 500 MG capsule, 4 tabs by mouth 1 hour prior to appointment, Disp: , Rfl:     vitamin D 25 MCG (1000 UT) CAPS, Take by mouth daily, Disp: , Rfl:     dorzolamide-timolol (COSOPT) 22.3-6.8 MG/ML ophthalmic solution, 1 drop into affected eye, Disp: , Rfl:     latanoprost (XALATAN) 0.005 % ophthalmic solution, Apply 1 drop to eye, Disp: , Rfl:     Allergies   Allergen Reactions    Motrin [Ibuprofen] Anaphylaxis     Nose bleeds       Social History     Socioeconomic History    Marital status:      Spouse name: Not on file    Number of children: Not on file    Years of education: Not on file    Highest education level: Not on file   Occupational History    Not on file   Tobacco Use    Smoking status: Former     Packs/day: 0.50     Types: Cigarettes     Start date: 1973     Quit date: 2003     Years since quittin.4    Smokeless tobacco: Never   Substance and Sexual Activity    Alcohol use: Never    Drug use: Never    Sexual activity: Not on file   Other Topics Concern    Not on file   Social History Narrative    Not on file     Social Determinants of Health     Financial Resource Strain: Not on file   Food Insecurity: Not on file   Transportation Needs: Not on file   Physical Activity: Not on file   Stress: Not on file   Social Connections: Not on file   Intimate Partner Violence: Not on file   Housing Stability: Not on file       Past Surgical History:   Procedure Laterality Date    ANKLE FRACTURE SURGERY Right 2020    CARPAL TUNNEL RELEASE Left 2017    CARPAL TUNNEL RELEASE Right 2016    CHOLECYSTECTOMY  1993       Family History:  Non-contributory. Vitals:    23 0933   BP: 136/74   Site: Left Upper Arm   Position: Sitting   Cuff Size: Large Adult   Temp: 97.5 °F (36.4 °C)   TempSrc: Temporal   Weight: 173 lb 9.6 oz (78.7 kg)   Height: 5' 4\" (1.626 m)      Body mass index is 29.8 kg/m².      PE:  /74 (Site: Left Upper Arm, No

## 2023-05-25 RX ORDER — M-VIT,TX,IRON,MINS/CALC/FOLIC 27MG-0.4MG
1 TABLET ORAL DAILY
COMMUNITY

## 2023-05-25 RX ORDER — ACETAMINOPHEN 160 MG
TABLET,DISINTEGRATING ORAL DAILY
COMMUNITY

## 2023-05-25 NOTE — TELEPHONE ENCOUNTER
When Dr. Jefferson Nuno office called she told me that we did not need to send them anything. They would be able to see it in Epic.

## 2023-06-02 ENCOUNTER — ANESTHESIA EVENT (OUTPATIENT)
Facility: HOSPITAL | Age: 68
End: 2023-06-02
Payer: MEDICARE

## 2023-06-05 ENCOUNTER — ANESTHESIA (OUTPATIENT)
Facility: HOSPITAL | Age: 68
End: 2023-06-05
Payer: MEDICARE

## 2023-06-05 ENCOUNTER — APPOINTMENT (OUTPATIENT)
Facility: HOSPITAL | Age: 68
End: 2023-06-05
Attending: ORTHOPAEDIC SURGERY
Payer: MEDICARE

## 2023-06-05 ENCOUNTER — HOSPITAL ENCOUNTER (OUTPATIENT)
Facility: HOSPITAL | Age: 68
Setting detail: OBSERVATION
Discharge: HOME | End: 2023-06-06
Attending: ORTHOPAEDIC SURGERY | Admitting: ORTHOPAEDIC SURGERY
Payer: MEDICARE

## 2023-06-05 DIAGNOSIS — M16.11 ARTHRITIS OF RIGHT HIP: Primary | ICD-10-CM

## 2023-06-05 PROBLEM — M16.10 ARTHRITIS, HIP: Status: ACTIVE | Noted: 2023-06-05

## 2023-06-05 PROBLEM — M24.651 ANKYLOSIS OF RIGHT HIP: Status: ACTIVE | Noted: 2023-06-05

## 2023-06-05 PROCEDURE — 64450 NJX AA&/STRD OTHER PN/BRANCH: CPT | Performed by: ANESTHESIOLOGY

## 2023-06-05 PROCEDURE — 7100000001 HC PACU RECOVERY - ADDTL 15 MIN: Performed by: ORTHOPAEDIC SURGERY

## 2023-06-05 PROCEDURE — 6360000002 HC RX W HCPCS: Performed by: ORTHOPAEDIC SURGERY

## 2023-06-05 PROCEDURE — 6370000000 HC RX 637 (ALT 250 FOR IP): Performed by: ORTHOPAEDIC SURGERY

## 2023-06-05 PROCEDURE — 2700000000 HC OXYGEN THERAPY PER DAY

## 2023-06-05 PROCEDURE — 88311 DECALCIFY TISSUE: CPT

## 2023-06-05 PROCEDURE — 6360000002 HC RX W HCPCS: Performed by: NURSE ANESTHETIST, CERTIFIED REGISTERED

## 2023-06-05 PROCEDURE — 97162 PT EVAL MOD COMPLEX 30 MIN: CPT

## 2023-06-05 PROCEDURE — 6360000002 HC RX W HCPCS: Performed by: ANESTHESIOLOGY

## 2023-06-05 PROCEDURE — 73502 X-RAY EXAM HIP UNI 2-3 VIEWS: CPT

## 2023-06-05 PROCEDURE — 88304 TISSUE EXAM BY PATHOLOGIST: CPT

## 2023-06-05 PROCEDURE — 2500000003 HC RX 250 WO HCPCS: Performed by: ORTHOPAEDIC SURGERY

## 2023-06-05 PROCEDURE — 2709999900 HC NON-CHARGEABLE SUPPLY: Performed by: ORTHOPAEDIC SURGERY

## 2023-06-05 PROCEDURE — 76942 ECHO GUIDE FOR BIOPSY: CPT | Performed by: ANESTHESIOLOGY

## 2023-06-05 PROCEDURE — 6370000000 HC RX 637 (ALT 250 FOR IP): Performed by: PHYSICIAN ASSISTANT

## 2023-06-05 PROCEDURE — 6360000002 HC RX W HCPCS: Performed by: PHYSICIAN ASSISTANT

## 2023-06-05 PROCEDURE — G0378 HOSPITAL OBSERVATION PER HR: HCPCS

## 2023-06-05 PROCEDURE — 01214 ANES OPEN PX TOT HIP ARTHRP: CPT | Performed by: ANESTHESIOLOGY

## 2023-06-05 PROCEDURE — 94761 N-INVAS EAR/PLS OXIMETRY MLT: CPT

## 2023-06-05 PROCEDURE — 97530 THERAPEUTIC ACTIVITIES: CPT

## 2023-06-05 PROCEDURE — 2720000010 HC SURG SUPPLY STERILE: Performed by: ORTHOPAEDIC SURGERY

## 2023-06-05 PROCEDURE — 6370000000 HC RX 637 (ALT 250 FOR IP): Performed by: NURSE ANESTHETIST, CERTIFIED REGISTERED

## 2023-06-05 PROCEDURE — C1776 JOINT DEVICE (IMPLANTABLE): HCPCS | Performed by: ORTHOPAEDIC SURGERY

## 2023-06-05 PROCEDURE — 2580000003 HC RX 258: Performed by: NURSE ANESTHETIST, CERTIFIED REGISTERED

## 2023-06-05 PROCEDURE — 2580000003 HC RX 258: Performed by: ORTHOPAEDIC SURGERY

## 2023-06-05 PROCEDURE — 3600000013 HC SURGERY LEVEL 3 ADDTL 15MIN: Performed by: ORTHOPAEDIC SURGERY

## 2023-06-05 PROCEDURE — C9290 INJ, BUPIVACAINE LIPOSOME: HCPCS | Performed by: ORTHOPAEDIC SURGERY

## 2023-06-05 PROCEDURE — 2500000003 HC RX 250 WO HCPCS: Performed by: NURSE ANESTHETIST, CERTIFIED REGISTERED

## 2023-06-05 PROCEDURE — A4217 STERILE WATER/SALINE, 500 ML: HCPCS | Performed by: ORTHOPAEDIC SURGERY

## 2023-06-05 PROCEDURE — 2500000003 HC RX 250 WO HCPCS: Performed by: PHYSICIAN ASSISTANT

## 2023-06-05 PROCEDURE — 2580000003 HC RX 258: Performed by: PHYSICIAN ASSISTANT

## 2023-06-05 PROCEDURE — 3700000001 HC ADD 15 MINUTES (ANESTHESIA): Performed by: ORTHOPAEDIC SURGERY

## 2023-06-05 PROCEDURE — 3600000003 HC SURGERY LEVEL 3 BASE: Performed by: ORTHOPAEDIC SURGERY

## 2023-06-05 PROCEDURE — 3700000000 HC ANESTHESIA ATTENDED CARE: Performed by: ORTHOPAEDIC SURGERY

## 2023-06-05 PROCEDURE — 7100000000 HC PACU RECOVERY - FIRST 15 MIN: Performed by: ORTHOPAEDIC SURGERY

## 2023-06-05 DEVICE — HEAD FEM DIA36MM -2.5MM OFFSET HIP BIOLOX DELT CERAMIC TAPR: Type: IMPLANTABLE DEVICE | Site: HIP | Status: FUNCTIONAL

## 2023-06-05 DEVICE — STEM FEM SZ 6 L111MM NK L35MM 45MM OFFSET 127DEG HIP TI: Type: IMPLANTABLE DEVICE | Site: HIP | Status: FUNCTIONAL

## 2023-06-05 DEVICE — SCREW BNE L25MM DIA6.5MM HEX LO PROF TRIDENT II: Type: IMPLANTABLE DEVICE | Site: HIP | Status: FUNCTIONAL

## 2023-06-05 DEVICE — HEX DOME HOLE PLUG: Type: IMPLANTABLE DEVICE | Site: HIP | Status: FUNCTIONAL

## 2023-06-05 DEVICE — SHELL ACET SZ E DIA52MM 5 CLUS H TRITANIUM PRESSFIT PRI: Type: IMPLANTABLE DEVICE | Site: HIP | Status: FUNCTIONAL

## 2023-06-05 DEVICE — SCREW BNE L20MM DIA65MM LO PROF HEX TRIDENT LL: Type: IMPLANTABLE DEVICE | Site: HIP | Status: FUNCTIONAL

## 2023-06-05 DEVICE — INSERT ACET E 0 DEG 36 MM HIP X3 TRIDENT: Type: IMPLANTABLE DEVICE | Site: HIP | Status: FUNCTIONAL

## 2023-06-05 RX ORDER — MIDAZOLAM HYDROCHLORIDE 2 MG/2ML
2 INJECTION, SOLUTION INTRAMUSCULAR; INTRAVENOUS ONCE
Status: COMPLETED | OUTPATIENT
Start: 2023-06-05 | End: 2023-06-05

## 2023-06-05 RX ORDER — ROPIVACAINE HYDROCHLORIDE 5 MG/ML
30 INJECTION, SOLUTION EPIDURAL; INFILTRATION; PERINEURAL ONCE
Status: COMPLETED | OUTPATIENT
Start: 2023-06-05 | End: 2023-06-05

## 2023-06-05 RX ORDER — AMLODIPINE BESYLATE 5 MG/1
5 TABLET ORAL DAILY
Status: DISCONTINUED | OUTPATIENT
Start: 2023-06-05 | End: 2023-06-06 | Stop reason: HOSPADM

## 2023-06-05 RX ORDER — SODIUM CHLORIDE 9 MG/ML
INJECTION, SOLUTION INTRAVENOUS PRN
Status: DISCONTINUED | OUTPATIENT
Start: 2023-06-05 | End: 2023-06-05 | Stop reason: HOSPADM

## 2023-06-05 RX ORDER — SODIUM CHLORIDE 9 MG/ML
INJECTION, SOLUTION INTRAVENOUS PRN
Status: DISCONTINUED | OUTPATIENT
Start: 2023-06-05 | End: 2023-06-06 | Stop reason: HOSPADM

## 2023-06-05 RX ORDER — LIDOCAINE HYDROCHLORIDE 10 MG/ML
5 INJECTION, SOLUTION EPIDURAL; INFILTRATION; INTRACAUDAL; PERINEURAL ONCE
Status: COMPLETED | OUTPATIENT
Start: 2023-06-05 | End: 2023-06-05

## 2023-06-05 RX ORDER — ONDANSETRON 4 MG/1
4 TABLET, ORALLY DISINTEGRATING ORAL EVERY 6 HOURS PRN
Status: DISCONTINUED | OUTPATIENT
Start: 2023-06-05 | End: 2023-06-06 | Stop reason: HOSPADM

## 2023-06-05 RX ORDER — LISINOPRIL 20 MG/1
20 TABLET ORAL DAILY
Status: DISCONTINUED | OUTPATIENT
Start: 2023-06-05 | End: 2023-06-06 | Stop reason: HOSPADM

## 2023-06-05 RX ORDER — POLYETHYLENE GLYCOL 3350 17 G/17G
17 POWDER, FOR SOLUTION ORAL DAILY PRN
Status: DISCONTINUED | OUTPATIENT
Start: 2023-06-05 | End: 2023-06-06 | Stop reason: HOSPADM

## 2023-06-05 RX ORDER — SUCCINYLCHOLINE/SOD CL,ISO/PF 100 MG/5ML
SYRINGE (ML) INTRAVENOUS PRN
Status: DISCONTINUED | OUTPATIENT
Start: 2023-06-05 | End: 2023-06-05 | Stop reason: SDUPTHER

## 2023-06-05 RX ORDER — PROPOFOL 10 MG/ML
INJECTION, EMULSION INTRAVENOUS PRN
Status: DISCONTINUED | OUTPATIENT
Start: 2023-06-05 | End: 2023-06-05 | Stop reason: SDUPTHER

## 2023-06-05 RX ORDER — ACETAMINOPHEN 500 MG
1000 TABLET ORAL
Status: COMPLETED | OUTPATIENT
Start: 2023-06-05 | End: 2023-06-05

## 2023-06-05 RX ORDER — DEXAMETHASONE SODIUM PHOSPHATE 4 MG/ML
INJECTION, SOLUTION INTRA-ARTICULAR; INTRALESIONAL; INTRAMUSCULAR; INTRAVENOUS; SOFT TISSUE PRN
Status: DISCONTINUED | OUTPATIENT
Start: 2023-06-05 | End: 2023-06-05 | Stop reason: SDUPTHER

## 2023-06-05 RX ORDER — ONDANSETRON 4 MG/1
4 TABLET, FILM COATED ORAL EVERY 8 HOURS PRN
Qty: 30 TABLET | Refills: 2 | Status: SHIPPED | OUTPATIENT
Start: 2023-06-05 | End: 2023-06-06 | Stop reason: SDUPTHER

## 2023-06-05 RX ORDER — LISINOPRIL AND HYDROCHLOROTHIAZIDE 20; 12.5 MG/1; MG/1
1 TABLET ORAL DAILY
Status: DISCONTINUED | OUTPATIENT
Start: 2023-06-05 | End: 2023-06-05 | Stop reason: CLARIF

## 2023-06-05 RX ORDER — PHENYLEPHRINE HCL IN 0.9% NACL 1 MG/10 ML
SYRINGE (ML) INTRAVENOUS PRN
Status: DISCONTINUED | OUTPATIENT
Start: 2023-06-05 | End: 2023-06-05 | Stop reason: SDUPTHER

## 2023-06-05 RX ORDER — DORZOLAMIDE HYDROCHLORIDE AND TIMOLOL MALEATE 20; 5 MG/ML; MG/ML
1 SOLUTION/ DROPS OPHTHALMIC 2 TIMES DAILY
Status: DISCONTINUED | OUTPATIENT
Start: 2023-06-05 | End: 2023-06-06 | Stop reason: HOSPADM

## 2023-06-05 RX ORDER — SODIUM CHLORIDE 0.9 % (FLUSH) 0.9 %
5-40 SYRINGE (ML) INJECTION EVERY 12 HOURS SCHEDULED
Status: DISCONTINUED | OUTPATIENT
Start: 2023-06-05 | End: 2023-06-05 | Stop reason: HOSPADM

## 2023-06-05 RX ORDER — OXYCODONE AND ACETAMINOPHEN 7.5; 325 MG/1; MG/1
1-2 TABLET ORAL EVERY 6 HOURS PRN
Qty: 56 TABLET | Refills: 0 | Status: SHIPPED | OUTPATIENT
Start: 2023-06-05 | End: 2023-06-06 | Stop reason: SDUPTHER

## 2023-06-05 RX ORDER — FENTANYL CITRATE 50 UG/ML
25 INJECTION, SOLUTION INTRAMUSCULAR; INTRAVENOUS EVERY 5 MIN PRN
Status: DISCONTINUED | OUTPATIENT
Start: 2023-06-05 | End: 2023-06-06 | Stop reason: HOSPADM

## 2023-06-05 RX ORDER — SODIUM CHLORIDE 0.9 % (FLUSH) 0.9 %
5-40 SYRINGE (ML) INJECTION PRN
Status: DISCONTINUED | OUTPATIENT
Start: 2023-06-05 | End: 2023-06-05 | Stop reason: HOSPADM

## 2023-06-05 RX ORDER — SODIUM CHLORIDE 9 MG/ML
INJECTION, SOLUTION INTRAVENOUS CONTINUOUS
Status: DISCONTINUED | OUTPATIENT
Start: 2023-06-05 | End: 2023-06-06 | Stop reason: HOSPADM

## 2023-06-05 RX ORDER — ACETAMINOPHEN 500 MG
1000 TABLET ORAL EVERY 6 HOURS
Status: DISCONTINUED | OUTPATIENT
Start: 2023-06-05 | End: 2023-06-06

## 2023-06-05 RX ORDER — FENTANYL CITRATE 50 UG/ML
100 INJECTION, SOLUTION INTRAMUSCULAR; INTRAVENOUS ONCE
Status: COMPLETED | OUTPATIENT
Start: 2023-06-05 | End: 2023-06-05

## 2023-06-05 RX ORDER — HYDROCHLOROTHIAZIDE 25 MG/1
12.5 TABLET ORAL DAILY
Status: DISCONTINUED | OUTPATIENT
Start: 2023-06-05 | End: 2023-06-06 | Stop reason: HOSPADM

## 2023-06-05 RX ORDER — FENTANYL CITRATE 50 UG/ML
25 INJECTION, SOLUTION INTRAMUSCULAR; INTRAVENOUS EVERY 5 MIN PRN
Status: COMPLETED | OUTPATIENT
Start: 2023-06-05 | End: 2023-06-05

## 2023-06-05 RX ORDER — DROPERIDOL 2.5 MG/ML
0.62 INJECTION, SOLUTION INTRAMUSCULAR; INTRAVENOUS
Status: DISCONTINUED | OUTPATIENT
Start: 2023-06-05 | End: 2023-06-05 | Stop reason: HOSPADM

## 2023-06-05 RX ORDER — SODIUM CHLORIDE 0.9 % (FLUSH) 0.9 %
5-40 SYRINGE (ML) INJECTION PRN
Status: DISCONTINUED | OUTPATIENT
Start: 2023-06-05 | End: 2023-06-06 | Stop reason: HOSPADM

## 2023-06-05 RX ORDER — DEXAMETHASONE SODIUM PHOSPHATE 4 MG/ML
8 INJECTION, SOLUTION INTRA-ARTICULAR; INTRALESIONAL; INTRAMUSCULAR; INTRAVENOUS; SOFT TISSUE
Status: COMPLETED | OUTPATIENT
Start: 2023-06-05 | End: 2023-06-05

## 2023-06-05 RX ORDER — OXYCODONE HYDROCHLORIDE 5 MG/1
5 TABLET ORAL
Status: DISCONTINUED | OUTPATIENT
Start: 2023-06-05 | End: 2023-06-06

## 2023-06-05 RX ORDER — FAMOTIDINE 20 MG/1
20 TABLET, FILM COATED ORAL ONCE
Status: COMPLETED | OUTPATIENT
Start: 2023-06-05 | End: 2023-06-05

## 2023-06-05 RX ORDER — ROCURONIUM BROMIDE 10 MG/ML
INJECTION, SOLUTION INTRAVENOUS PRN
Status: DISCONTINUED | OUTPATIENT
Start: 2023-06-05 | End: 2023-06-05 | Stop reason: SDUPTHER

## 2023-06-05 RX ORDER — PREGABALIN 75 MG/1
75 CAPSULE ORAL 2 TIMES DAILY
Status: DISCONTINUED | OUTPATIENT
Start: 2023-06-05 | End: 2023-06-05 | Stop reason: HOSPADM

## 2023-06-05 RX ORDER — NEOSTIGMINE METHYLSULFATE 1 MG/ML
INJECTION, SOLUTION INTRAVENOUS PRN
Status: DISCONTINUED | OUTPATIENT
Start: 2023-06-05 | End: 2023-06-05 | Stop reason: SDUPTHER

## 2023-06-05 RX ORDER — FENTANYL CITRATE 50 UG/ML
INJECTION, SOLUTION INTRAMUSCULAR; INTRAVENOUS PRN
Status: DISCONTINUED | OUTPATIENT
Start: 2023-06-05 | End: 2023-06-05 | Stop reason: SDUPTHER

## 2023-06-05 RX ORDER — OXYCODONE HYDROCHLORIDE 5 MG/1
10 TABLET ORAL EVERY 4 HOURS PRN
Status: DISCONTINUED | OUTPATIENT
Start: 2023-06-05 | End: 2023-06-06

## 2023-06-05 RX ORDER — EPHEDRINE SULFATE/0.9% NACL/PF 50 MG/5 ML
SYRINGE (ML) INTRAVENOUS PRN
Status: DISCONTINUED | OUTPATIENT
Start: 2023-06-05 | End: 2023-06-05 | Stop reason: SDUPTHER

## 2023-06-05 RX ORDER — ASPIRIN 81 MG/1
81 TABLET ORAL 2 TIMES DAILY
Status: DISCONTINUED | OUTPATIENT
Start: 2023-06-06 | End: 2023-06-06 | Stop reason: HOSPADM

## 2023-06-05 RX ORDER — DEXTROSE MONOHYDRATE 100 MG/ML
INJECTION, SOLUTION INTRAVENOUS CONTINUOUS PRN
Status: DISCONTINUED | OUTPATIENT
Start: 2023-06-05 | End: 2023-06-05 | Stop reason: HOSPADM

## 2023-06-05 RX ORDER — SODIUM CHLORIDE, SODIUM LACTATE, POTASSIUM CHLORIDE, CALCIUM CHLORIDE 600; 310; 30; 20 MG/100ML; MG/100ML; MG/100ML; MG/100ML
INJECTION, SOLUTION INTRAVENOUS CONTINUOUS
Status: DISCONTINUED | OUTPATIENT
Start: 2023-06-05 | End: 2023-06-05 | Stop reason: HOSPADM

## 2023-06-05 RX ORDER — SODIUM CHLORIDE 0.9 % (FLUSH) 0.9 %
5-40 SYRINGE (ML) INJECTION EVERY 12 HOURS SCHEDULED
Status: DISCONTINUED | OUTPATIENT
Start: 2023-06-05 | End: 2023-06-06 | Stop reason: HOSPADM

## 2023-06-05 RX ORDER — FAMOTIDINE 20 MG/1
20 TABLET, FILM COATED ORAL 2 TIMES DAILY
Status: DISCONTINUED | OUTPATIENT
Start: 2023-06-05 | End: 2023-06-06 | Stop reason: HOSPADM

## 2023-06-05 RX ORDER — ONDANSETRON 2 MG/ML
4 INJECTION INTRAMUSCULAR; INTRAVENOUS EVERY 6 HOURS PRN
Status: DISCONTINUED | OUTPATIENT
Start: 2023-06-05 | End: 2023-06-06 | Stop reason: HOSPADM

## 2023-06-05 RX ORDER — GLYCOPYRROLATE 0.2 MG/ML
INJECTION INTRAMUSCULAR; INTRAVENOUS PRN
Status: DISCONTINUED | OUTPATIENT
Start: 2023-06-05 | End: 2023-06-05 | Stop reason: SDUPTHER

## 2023-06-05 RX ORDER — SENNA AND DOCUSATE SODIUM 50; 8.6 MG/1; MG/1
1 TABLET, FILM COATED ORAL 2 TIMES DAILY
Status: DISCONTINUED | OUTPATIENT
Start: 2023-06-05 | End: 2023-06-06 | Stop reason: HOSPADM

## 2023-06-05 RX ORDER — MEPERIDINE HYDROCHLORIDE 25 MG/ML
12.5 INJECTION INTRAMUSCULAR; INTRAVENOUS; SUBCUTANEOUS EVERY 5 MIN PRN
Status: DISCONTINUED | OUTPATIENT
Start: 2023-06-05 | End: 2023-06-05 | Stop reason: HOSPADM

## 2023-06-05 RX ORDER — ROPIVACAINE HYDROCHLORIDE 5 MG/ML
INJECTION, SOLUTION EPIDURAL; INFILTRATION; PERINEURAL
Status: COMPLETED | OUTPATIENT
Start: 2023-06-05 | End: 2023-06-05

## 2023-06-05 RX ORDER — ONDANSETRON 2 MG/ML
INJECTION INTRAMUSCULAR; INTRAVENOUS PRN
Status: DISCONTINUED | OUTPATIENT
Start: 2023-06-05 | End: 2023-06-05 | Stop reason: SDUPTHER

## 2023-06-05 RX ORDER — DOCUSATE SODIUM 100 MG/1
100 CAPSULE, LIQUID FILLED ORAL 2 TIMES DAILY
Qty: 60 CAPSULE | Refills: 1 | Status: SHIPPED | OUTPATIENT
Start: 2023-06-05 | End: 2023-06-06 | Stop reason: SDUPTHER

## 2023-06-05 RX ORDER — KETOROLAC TROMETHAMINE 15 MG/ML
15 INJECTION, SOLUTION INTRAMUSCULAR; INTRAVENOUS EVERY 6 HOURS
Status: DISCONTINUED | OUTPATIENT
Start: 2023-06-05 | End: 2023-06-06 | Stop reason: HOSPADM

## 2023-06-05 RX ORDER — LIDOCAINE HYDROCHLORIDE 20 MG/ML
INJECTION, SOLUTION EPIDURAL; INFILTRATION; INTRACAUDAL; PERINEURAL PRN
Status: DISCONTINUED | OUTPATIENT
Start: 2023-06-05 | End: 2023-06-05 | Stop reason: SDUPTHER

## 2023-06-05 RX ORDER — CEFADROXIL 500 MG/1
500 CAPSULE ORAL 2 TIMES DAILY
Qty: 20 CAPSULE | Refills: 0 | Status: SHIPPED | OUTPATIENT
Start: 2023-06-05 | End: 2023-06-06 | Stop reason: SDUPTHER

## 2023-06-05 RX ORDER — PROCHLORPERAZINE EDISYLATE 5 MG/ML
5 INJECTION INTRAMUSCULAR; INTRAVENOUS ONCE
Status: COMPLETED | OUTPATIENT
Start: 2023-06-05 | End: 2023-06-05

## 2023-06-05 RX ADMIN — PREGABALIN 75 MG: 75 CAPSULE ORAL at 07:12

## 2023-06-05 RX ADMIN — ACETAMINOPHEN 1000 MG: 500 TABLET ORAL at 06:49

## 2023-06-05 RX ADMIN — SODIUM CHLORIDE, PRESERVATIVE FREE 10 ML: 5 INJECTION INTRAVENOUS at 21:47

## 2023-06-05 RX ADMIN — FENTANYL CITRATE 25 MCG: 50 INJECTION, SOLUTION INTRAMUSCULAR; INTRAVENOUS at 09:55

## 2023-06-05 RX ADMIN — FENTANYL CITRATE 50 MCG: 50 INJECTION INTRAMUSCULAR; INTRAVENOUS at 08:09

## 2023-06-05 RX ADMIN — DEXAMETHASONE SODIUM PHOSPHATE 8 MG: 4 INJECTION, SOLUTION INTRAMUSCULAR; INTRAVENOUS at 06:44

## 2023-06-05 RX ADMIN — FENTANYL CITRATE 25 MCG: 50 INJECTION, SOLUTION INTRAMUSCULAR; INTRAVENOUS at 10:00

## 2023-06-05 RX ADMIN — SODIUM CHLORIDE, SODIUM LACTATE, POTASSIUM CHLORIDE, AND CALCIUM CHLORIDE: 600; 310; 30; 20 INJECTION, SOLUTION INTRAVENOUS at 06:34

## 2023-06-05 RX ADMIN — TRANEXAMIC ACID 1000 MG: 100 INJECTION, SOLUTION INTRAVENOUS at 07:39

## 2023-06-05 RX ADMIN — FENTANYL CITRATE 50 MCG: 50 INJECTION INTRAMUSCULAR; INTRAVENOUS at 09:19

## 2023-06-05 RX ADMIN — FENTANYL CITRATE 50 MCG: 50 INJECTION INTRAMUSCULAR; INTRAVENOUS at 07:55

## 2023-06-05 RX ADMIN — FENTANYL CITRATE 25 MCG: 50 INJECTION, SOLUTION INTRAMUSCULAR; INTRAVENOUS at 09:50

## 2023-06-05 RX ADMIN — DOCUSATE SODIUM 50MG AND SENNOSIDES 8.6MG 1 TABLET: 8.6; 5 TABLET, FILM COATED ORAL at 11:32

## 2023-06-05 RX ADMIN — ROCURONIUM BROMIDE 10 MG: 10 INJECTION, SOLUTION INTRAVENOUS at 08:05

## 2023-06-05 RX ADMIN — SODIUM CHLORIDE: 9 INJECTION, SOLUTION INTRAVENOUS at 11:32

## 2023-06-05 RX ADMIN — Medication 100 MCG: at 08:49

## 2023-06-05 RX ADMIN — ONDANSETRON 4 MG: 2 INJECTION INTRAMUSCULAR; INTRAVENOUS at 14:39

## 2023-06-05 RX ADMIN — Medication 100 MCG: at 08:21

## 2023-06-05 RX ADMIN — DORZOLAMIDE HYDROCHLORIDE AND TIMOLOL MALEATE 1 DROP: 20; 5 SOLUTION/ DROPS OPHTHALMIC at 16:36

## 2023-06-05 RX ADMIN — ACETAMINOPHEN 1000 MG: 500 TABLET ORAL at 11:32

## 2023-06-05 RX ADMIN — DEXAMETHASONE SODIUM PHOSPHATE 4 MG: 4 INJECTION, SOLUTION INTRAMUSCULAR; INTRAVENOUS at 08:05

## 2023-06-05 RX ADMIN — ROCURONIUM BROMIDE 40 MG: 10 INJECTION, SOLUTION INTRAVENOUS at 07:42

## 2023-06-05 RX ADMIN — PROCHLORPERAZINE EDISYLATE 5 MG: 5 INJECTION, SOLUTION INTRAMUSCULAR; INTRAVENOUS at 10:34

## 2023-06-05 RX ADMIN — FAMOTIDINE 20 MG: 20 TABLET, FILM COATED ORAL at 06:49

## 2023-06-05 RX ADMIN — SODIUM CHLORIDE, PRESERVATIVE FREE 10 ML: 5 INJECTION INTRAVENOUS at 11:32

## 2023-06-05 RX ADMIN — FAMOTIDINE 20 MG: 20 TABLET ORAL at 21:46

## 2023-06-05 RX ADMIN — TRANEXAMIC ACID 1000 MG: 100 INJECTION, SOLUTION INTRAVENOUS at 08:49

## 2023-06-05 RX ADMIN — DOCUSATE SODIUM 50MG AND SENNOSIDES 8.6MG 1 TABLET: 8.6; 5 TABLET, FILM COATED ORAL at 21:47

## 2023-06-05 RX ADMIN — FENTANYL CITRATE 50 MCG: 50 INJECTION INTRAMUSCULAR; INTRAVENOUS at 09:17

## 2023-06-05 RX ADMIN — WATER 2000 MG: 1 INJECTION INTRAMUSCULAR; INTRAVENOUS; SUBCUTANEOUS at 23:57

## 2023-06-05 RX ADMIN — Medication 3 MG: at 08:59

## 2023-06-05 RX ADMIN — ROPIVACAINE HYDROCHLORIDE 40 ML: 5 INJECTION, SOLUTION EPIDURAL; INFILTRATION; PERINEURAL at 06:58

## 2023-06-05 RX ADMIN — ROPIVACAINE HYDROCHLORIDE 50 ML: 5 INJECTION, SOLUTION EPIDURAL; INFILTRATION; PERINEURAL at 07:07

## 2023-06-05 RX ADMIN — SODIUM CHLORIDE, SODIUM LACTATE, POTASSIUM CHLORIDE, AND CALCIUM CHLORIDE: 600; 310; 30; 20 INJECTION, SOLUTION INTRAVENOUS at 08:05

## 2023-06-05 RX ADMIN — ACETAMINOPHEN 1000 MG: 500 TABLET ORAL at 23:55

## 2023-06-05 RX ADMIN — ONDANSETRON 4 MG: 2 INJECTION INTRAMUSCULAR; INTRAVENOUS at 08:05

## 2023-06-05 RX ADMIN — MIDAZOLAM 2 MG: 1 INJECTION INTRAMUSCULAR; INTRAVENOUS at 06:59

## 2023-06-05 RX ADMIN — Medication 5 MG: at 08:33

## 2023-06-05 RX ADMIN — WATER 2000 MG: 1 INJECTION INTRAMUSCULAR; INTRAVENOUS; SUBCUTANEOUS at 16:29

## 2023-06-05 RX ADMIN — WATER 2000 MG: 1 INJECTION, SOLUTION INTRAMUSCULAR; INTRAVENOUS; SUBCUTANEOUS at 07:36

## 2023-06-05 RX ADMIN — LIDOCAINE HYDROCHLORIDE 2 ML: 10 INJECTION, SOLUTION EPIDURAL; INFILTRATION; INTRACAUDAL; PERINEURAL at 07:00

## 2023-06-05 RX ADMIN — DORZOLAMIDE HYDROCHLORIDE AND TIMOLOL MALEATE 1 DROP: 20; 5 SOLUTION/ DROPS OPHTHALMIC at 21:47

## 2023-06-05 RX ADMIN — LIDOCAINE HYDROCHLORIDE 80 MG: 20 INJECTION, SOLUTION EPIDURAL; INFILTRATION; INTRACAUDAL; PERINEURAL at 07:32

## 2023-06-05 RX ADMIN — GLYCOPYRROLATE 0.6 MG: 0.2 INJECTION INTRAMUSCULAR; INTRAVENOUS at 08:59

## 2023-06-05 RX ADMIN — Medication 80 MG: at 07:32

## 2023-06-05 RX ADMIN — FENTANYL CITRATE 100 MCG: 50 INJECTION, SOLUTION INTRAMUSCULAR; INTRAVENOUS at 06:59

## 2023-06-05 RX ADMIN — PROPOFOL 150 MG: 10 INJECTION, EMULSION INTRAVENOUS at 07:32

## 2023-06-05 ASSESSMENT — PAIN DESCRIPTION - ORIENTATION
ORIENTATION: RIGHT
ORIENTATION: RIGHT
ORIENTATION: RIGHT;UPPER
ORIENTATION: RIGHT

## 2023-06-05 ASSESSMENT — PAIN DESCRIPTION - LOCATION
LOCATION: HIP

## 2023-06-05 ASSESSMENT — PAIN SCALES - GENERAL
PAINLEVEL_OUTOF10: 0
PAINLEVEL_OUTOF10: 5
PAINLEVEL_OUTOF10: 0
PAINLEVEL_OUTOF10: 5
PAINLEVEL_OUTOF10: 0

## 2023-06-05 ASSESSMENT — PAIN - FUNCTIONAL ASSESSMENT
PAIN_FUNCTIONAL_ASSESSMENT: ACTIVITIES ARE NOT PREVENTED
PAIN_FUNCTIONAL_ASSESSMENT: 0-10
PAIN_FUNCTIONAL_ASSESSMENT: PREVENTS OR INTERFERES SOME ACTIVE ACTIVITIES AND ADLS

## 2023-06-05 ASSESSMENT — PAIN DESCRIPTION - DESCRIPTORS
DESCRIPTORS: SORE
DESCRIPTORS: ACHING

## 2023-06-05 NOTE — INTERVAL H&P NOTE
Update History & Physical    The patient's History and Physical of June 5, 2023 was reviewed with the patient and I examined the patient. There was no change. The surgical site was confirmed by the patient and me. Plan: The risks, benefits, expected outcome, and alternative to the recommended procedure have been discussed with the patient. Patient understands and wants to proceed with the procedure.      Electronically signed by Katerina Linares MD on 6/5/2023 at 6:52 AM

## 2023-06-05 NOTE — NURSE NAVIGATOR
Rounded on patient s/p right total hip replacement with Dr. Greg Silverman 06/05/2023. Patient observed to be alert and oriented x 3, sitting up in bed with family at bedside. She denies chest pain, shortness of breath or vomiting. She is endorsing nausea for which the nurse is currently medicating. Abductor pillow removed. Patient has full feeling in bilateral extremities, but is unable to perform straight leg raise with right leg due to block. Dressing to right hip is observed to be clean, dry and intact, ice pack applied for comfort. Patient received total hip replacement education book at the clinic level and all education was reviewed on the preop call. Education regarding the use of incentive spirometry was provided. Encouraged patient to utilize 10 times hourly to keep lungs expanded and prevent complications. Reviewed postoperative showering instructions. Patient was reminded that she may shower in 2 days, no tubs or submersion in water is allowed. She is to contact clinic Id dressing becomes more than 50 percent saturated or if it starts to lift. Encouraged hourly ambulation, 10 minutes every hour, followed by icing 20 minutes not to be placed directly on her skin. Encouraged the wearing of asael hose during daytime hours to assist with swelling. Patient verbalized understanding of all information provided. All questions answered.

## 2023-06-05 NOTE — ANESTHESIA POSTPROCEDURE EVALUATION
Department of Anesthesiology  Postprocedure Note    Patient: Rachelle Ortiz  MRN: [id number]  YOB: 1955  Date of evaluation: 6/5/2023      Procedure Summary     Date: 06/05/23 Room / Location: [facility] MAIN 07 / [facility] MAIN OR    Anesthesia Start: 0726 Anesthesia Stop: 3165    Procedure: RIGHT TOTAL HIP ARTHROPLASTY LATERAL (Right: Hip) Diagnosis:       Osteoarthritis of right hip, unspecified osteoarthritis type      (Osteoarthritis of right hip, unspecified osteoarthritis type [M16.11])    Surgeons: Sami Penaloza MD Responsible Provider: Ning Carvalho MD    Anesthesia Type: General ASA Status: 3          Anesthesia Type: General    Primo Phase I: Primo Score: 10    Primo Phase II:        Anesthesia Post Evaluation    Patient location during evaluation: PACU  Patient participation: complete - patient participated  Level of consciousness: awake  Pain score: 4  Airway patency: patent  Nausea & Vomiting: no nausea  Complications: no  Cardiovascular status: blood pressure returned to baseline  Respiratory status: acceptable  Hydration status: euvolemic

## 2023-06-05 NOTE — ANESTHESIA PRE PROCEDURE
Department of Anesthesiology  Preprocedure Note       Name:  Jhonny Morillo   Age:  76 y.o.  :  1955                                          MRN:  [id number]         Date:  2023      Surgeon: Fer Jules):  Britt Dangelo MD    Procedure: Procedure(s):  RIGHT TOTAL HIP ARTHROPLASTY LATERAL; ALVARO; Santo Arslan TO ASSIST; NERVE BLOCK; 23 HR    Medications prior to admission:   Prior to Admission medications    Medication Sig Start Date End Date Taking?  Authorizing Provider   Cholecalciferol (VITAMIN D3) 50 MCG (2000) CAPS Take by mouth daily   Yes Historical Provider, MD   Multiple Vitamins-Minerals (THERAPEUTIC MULTIVITAMIN-MINERALS) tablet Take 1 tablet by mouth daily   Yes Historical Provider, MD   Teriparatide, Recombinant, (FORTEO SC) Inject into the skin daily    Historical Provider, MD   amLODIPine (NORVASC) 5 MG tablet Take 1 tablet by mouth daily 23   Mirtha Chan MD   lisinopril-hydroCHLOROthiazide FAJARDO [facility]) 20-12.5 MG per tablet Take 1 tablet by mouth daily 23   Mirtha Chan MD   Abatacept (ORENCIA CLICKJECT) 572 MG/ML SOAJ weekly 21   Ar Automatic Reconciliation   dorzolamide-timolol (COSOPT) 22.3-6.8 MG/ML ophthalmic solution 1 drop into affected eye    Ar Automatic Reconciliation   latanoprost (XALATAN) 0.005 % ophthalmic solution Apply 1 drop to eye    Ar Automatic Reconciliation       Current medications:    Current Facility-Administered Medications   Medication Dose Route Frequency Provider Last Rate Last Admin    sodium chloride flush 0.9 % injection 5-40 mL  5-40 mL IntraVENous 2 times per day Rossy Yadav APRN - CRNA        sodium chloride flush 0.9 % injection 5-40 mL  5-40 mL IntraVENous PRN Rossy Yadav APRN - CRNA        0.9 % sodium chloride infusion   IntraVENous PRN Rossy Yadav APRN - CRNA        lactated ringers IV soln infusion   IntraVENous Continuous CARLOS Fuller - CRNA 100 mL/hr at 23 [facility]

## 2023-06-05 NOTE — PERIOP NOTE
TRANSFER - OUT REPORT:    Telephone report given to Jenny Armenta on Lisa Din  being transferred to [facility] for routine post-op       Report consisted of patient's Situation, Background, Assessment and   Recommendations(SBAR). Information from the following report(s) Nurse Handoff Report, Intake/Output, and MAR was reviewed with the receiving nurse. [place] Assessment: No data recorded  Lines:   Peripheral IV 06/05/23 Proximal;Right; Anterior Forearm (Active)   Site Assessment Clean, dry & intact 06/05/23 1134   Line Status Capped; Infusing 06/05/23 1134   Line Care Connections checked and tightened 06/05/23 1134   Phlebitis Assessment No symptoms 06/05/23 1134   Infiltration Assessment 0 06/05/23 1134   Alcohol Cap Used Yes 06/05/23 1134   Dressing Status Clean, dry & intact 06/05/23 1134   Dressing Type Transparent 06/05/23 1134        Opportunity for questions and clarification was provided.       Patient transported with:  O2 @ 3lpm and Tech

## 2023-06-05 NOTE — ANESTHESIA PROCEDURE NOTES
Peripheral Block    Patient location during procedure: pre-op  Reason for block: post-op pain management and at surgeon's request  Start time: 6/5/2023 6:58 AM  End time: 6/5/2023 7:10 AM  Staffing  Performed: anesthesiologist   Preanesthetic Checklist  Completed: patient identified, IV checked, site marked, risks and benefits discussed, surgical/procedural consents, equipment checked, pre-op evaluation, timeout performed, anesthesia consent given, oxygen available, monitors applied/VS acknowledged, fire risk safety assessment completed and verbalized and blood product R/B/A discussed and consented  Peripheral Block   Patient position: supine  Prep: ChloraPrep  Provider prep: mask and sterile gloves  Patient monitoring: cardiac monitor, continuous pulse ox, frequent blood pressure checks, IV access and responsive to questions  Block type: Fascia iliaca  Laterality: right  Injection technique: single-shot  Guidance: ultrasound guided  Local infiltration: lidocaine  Infiltration strength: 1 %  Local infiltration: lidocaine  Dose: 2 mL    Needle   Needle type: insulated echogenic nerve stimulator needle   Needle gauge: 21 G  Needle localization: ultrasound guidance  Needle length: 10 cm  Assessment   Injection assessment: negative aspiration for heme, no paresthesia on injection, local visualized surrounding nerve on ultrasound and no intravascular symptoms  Paresthesia pain: none  Slow fractionated injection: yes  Hemodynamics: stable  Real-time US image taken/store: yes  Outcomes: patient tolerated procedure well    Medications Administered  ropivacaine (NAROPIN) injection 0.5% - Perineural   40 mL - 6/5/2023 6:58:00 AM

## 2023-06-05 NOTE — BRIEF OP NOTE
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 Item Serial No.  Lot No. LRB No. Used Action   SCREW BNE L25MM DIA6. 5MM HEX LO PROF TRIDENT II - [id number]  SCREW BNE L25MM DIA6. 5MM HEX LO PROF TRIDENT II  [employer] [facility] [id number] Right 1 Implanted   SCREW BNE L20MM [id number] LO PROF HEX TRIDENT LL - [id number]  SCREW BNE L20MM [id number] LO PROF HEX TRIDENT LL  [employer] [facility] [id number] Right 1 Implanted   SHELL ACET SZ E [id number] 5 CLUS H TRITANIUM PRESSFIT KELLEY - [id number]  SHELL ACET SZ E [id number] 5 CLUS H TRITANIUM PRESSFIT KELLEY  [employer] [facility] [id number] Right 1 Implanted   HEX DOME HOLE PLUG - [id number]  HEX DOME HOLE PLUG  [employer] [facility] [id number] Right 1 Implanted   INSERT ACET E 0 DEG 36 MM HIP X3 TRIDENT - [id number]  INSERT ACET E 0 DEG 36 MM HIP X3 TRIDENT  [employer] [facility] [id number] Right 1 Implanted   STEM FEM SZ 6 L111MM NK L35MM 45MM OFFSET 127DEG HIP TI - [id number]  STEM FEM SZ 6 L111MM NK L35MM 45MM OFFSET 127DEG HIP TI  [employer] [facility] [id number] Right 1 Implanted   HEAD FEM [id number] -2.5MM OFFSET HIP BIOLOX DELT CERAMIC TAPR - [id number]  HEAD FEM [id number] -2.5MM OFFSET HIP BIOLOX DELT CERAMIC TAPR  [employer]

## 2023-06-06 VITALS
DIASTOLIC BLOOD PRESSURE: 65 MMHG | HEART RATE: 76 BPM | OXYGEN SATURATION: 98 % | RESPIRATION RATE: 18 BRPM | TEMPERATURE: 97.5 F | HEIGHT: 65 IN | SYSTOLIC BLOOD PRESSURE: 135 MMHG | BODY MASS INDEX: 28.82 KG/M2 | WEIGHT: 173 LBS

## 2023-06-06 PROCEDURE — 97116 GAIT TRAINING THERAPY: CPT

## 2023-06-06 PROCEDURE — G0378 HOSPITAL OBSERVATION PER HR: HCPCS

## 2023-06-06 PROCEDURE — 97165 OT EVAL LOW COMPLEX 30 MIN: CPT

## 2023-06-06 PROCEDURE — 2580000003 HC RX 258: Performed by: ORTHOPAEDIC SURGERY

## 2023-06-06 PROCEDURE — 6370000000 HC RX 637 (ALT 250 FOR IP): Performed by: ORTHOPAEDIC SURGERY

## 2023-06-06 PROCEDURE — 97535 SELF CARE MNGMENT TRAINING: CPT

## 2023-06-06 PROCEDURE — 97530 THERAPEUTIC ACTIVITIES: CPT

## 2023-06-06 PROCEDURE — 6360000002 HC RX W HCPCS: Performed by: ORTHOPAEDIC SURGERY

## 2023-06-06 RX ORDER — OXYCODONE AND ACETAMINOPHEN 7.5; 325 MG/1; MG/1
1-2 TABLET ORAL EVERY 6 HOURS PRN
Qty: 56 TABLET | Refills: 0 | Status: SHIPPED | OUTPATIENT
Start: 2023-06-06 | End: 2023-06-13

## 2023-06-06 RX ORDER — OXYCODONE AND ACETAMINOPHEN 7.5; 325 MG/1; MG/1
1 TABLET ORAL EVERY 6 HOURS PRN
Status: DISCONTINUED | OUTPATIENT
Start: 2023-06-06 | End: 2023-06-06 | Stop reason: HOSPADM

## 2023-06-06 RX ORDER — DOCUSATE SODIUM 100 MG/1
100 CAPSULE, LIQUID FILLED ORAL 2 TIMES DAILY
Qty: 60 CAPSULE | Refills: 1 | Status: SHIPPED | OUTPATIENT
Start: 2023-06-06 | End: 2023-08-05

## 2023-06-06 RX ORDER — CEFADROXIL 500 MG/1
500 CAPSULE ORAL 2 TIMES DAILY
Qty: 20 CAPSULE | Refills: 0 | Status: SHIPPED | OUTPATIENT
Start: 2023-06-06 | End: 2023-06-16

## 2023-06-06 RX ORDER — ONDANSETRON 4 MG/1
4 TABLET, FILM COATED ORAL EVERY 8 HOURS PRN
Qty: 30 TABLET | Refills: 2 | Status: SHIPPED | OUTPATIENT
Start: 2023-06-06

## 2023-06-06 RX ADMIN — WATER 2000 MG: 1 INJECTION INTRAMUSCULAR; INTRAVENOUS; SUBCUTANEOUS at 09:33

## 2023-06-06 RX ADMIN — LISINOPRIL 20 MG: 20 TABLET ORAL at 09:33

## 2023-06-06 RX ADMIN — DOCUSATE SODIUM 50MG AND SENNOSIDES 8.6MG 1 TABLET: 8.6; 5 TABLET, FILM COATED ORAL at 09:33

## 2023-06-06 RX ADMIN — FAMOTIDINE 20 MG: 20 TABLET ORAL at 09:33

## 2023-06-06 RX ADMIN — ACETAMINOPHEN 1000 MG: 500 TABLET ORAL at 04:35

## 2023-06-06 RX ADMIN — HYDROCHLOROTHIAZIDE 12.5 MG: 25 TABLET ORAL at 09:33

## 2023-06-06 RX ADMIN — OXYCODONE AND ACETAMINOPHEN 1 TABLET: 7.5; 325 TABLET ORAL at 11:00

## 2023-06-06 RX ADMIN — DORZOLAMIDE HYDROCHLORIDE AND TIMOLOL MALEATE 1 DROP: 20; 5 SOLUTION/ DROPS OPHTHALMIC at 11:15

## 2023-06-06 RX ADMIN — ASPIRIN 81 MG: 81 TABLET, COATED ORAL at 09:33

## 2023-06-06 RX ADMIN — AMLODIPINE BESYLATE 5 MG: 5 TABLET ORAL at 09:33

## 2023-06-06 ASSESSMENT — PAIN DESCRIPTION - ORIENTATION
ORIENTATION: RIGHT

## 2023-06-06 ASSESSMENT — PAIN SCALES - WONG BAKER
WONGBAKER_NUMERICALRESPONSE: 0
WONGBAKER_NUMERICALRESPONSE: 0

## 2023-06-06 ASSESSMENT — PAIN SCALES - GENERAL
PAINLEVEL_OUTOF10: 5
PAINLEVEL_OUTOF10: 0
PAINLEVEL_OUTOF10: 0
PAINLEVEL_OUTOF10: 5

## 2023-06-06 ASSESSMENT — PAIN DESCRIPTION - LOCATION
LOCATION: HIP

## 2023-06-06 ASSESSMENT — PAIN DESCRIPTION - DESCRIPTORS
DESCRIPTORS: ACHING

## 2023-06-06 ASSESSMENT — PAIN - FUNCTIONAL ASSESSMENT: PAIN_FUNCTIONAL_ASSESSMENT: ACTIVITIES ARE NOT PREVENTED

## 2023-06-06 NOTE — PROGRESS NOTES
conducted a pre-surgery visit with Sajan Aguayo, who is a 76 y.o.,female. The  provided the following Interventions:  Initiated a relationship of care and support. Offered prayer and assurance of continued prayers on patient's behalf. There is no advance directive on file. Plan:  Chaplains will continue to follow and will provide pastoral care on an as needed/requested basis.  recommends bedside caregivers page  on duty if patient shows signs of acute spiritual or emotional distress. [address] 3   Board Certified [address] [place] [address]   [phone]
1100 patient received via bed, alertx4, o2 2l  Dressing cdi  Working with physio, poor results  1455 Zofran given for nausea  Knee immobilizer placed on patient  Oob to bsc,voided  1900 Dinorah given for pain
Discharge teaching completed at bedside with patient. Opportunity provided for clarifying questions. All answered to patient satisfaction. IV removed . ID removed and shredded.
Patient is alert and oriented times three with no signs or symptom of distress. Dressing is clean dry and intact and pulses palpable.  Patient is unsteady to bedside commode
Patient is alert andoriented times three with no signs or symptoms of distress at this time Dressing is clean dry and intact and pulses palpable.  Patient is moving a lot better today
Pt taken for discharge in wheelchair by transport. NAD observed.
Report received from 2025 [facility] given to [facility]
BUE  Coordination: Within functional limits      Balance:  Intact : Sitting,  Impaired standing dynamic, F+    Strength: BUE  Strength: Generally decreased, functional    Tone & Sensation: BUE  Tone: Normal  Sensation: Intact    Range of Motion: BUE  AROM: Within functional limits       Functional Mobility and Transfers for ADLs:  Bed Mobility:  Bed Mobility Training  Bed Mobility Training: No    Transfers:  Transfer Training  Transfer Training: Yes  Sit to Stand: Stand-by assistance  Stand to Sit: Stand-by assistance    ADL Assessment:   Feeding: Independent  Grooming: Modified independent   UE Bathing: Modified independent   LE Bathing: Stand by assistance  UE Dressing: Modified independent   LE Dressing: Stand by assistance  Toileting: Stand by assistance      Pain:  Pain level pre-treatment: 5/10 , RLE  Pain level post-treatment: 5/10   Pain Intervention(s): Rest, Ice, Repositioning   Response to intervention: Nurse notified    Activity Tolerance:   Activity Tolerance: Patient Tolerated treatment well  Please refer to the flowsheet for vital signs taken during this treatment. After treatment:   [x] Patient left in no apparent distress sitting up in chair  [] Patient left in no apparent distress in bed  [x] Call bell left within reach  [x] Nursing notified  [] Caregiver present  [] Bed alarm activated    COMMUNICATION/EDUCATION:   Patient Education  Education Given To: Patient; Family  Education Provided: Role of Therapy;Plan of Care; Fall Prevention Strategies; ADL Adaptive Strategies; Family Education;Precautions; Energy Conservation;Transfer Training;Equipment  Education Method: Demonstration;Verbal;Teach Back  Barriers to Learning: None  Education Outcome: Verbalized understanding    Thank you for this referral.  Amanda Gross OTR/L  Minutes: 38    Eval Complexity: Decision Making: Low Complexity

## 2023-06-06 NOTE — NURSE NAVIGATOR
Rounded on patient s/p right total hip replacement with Dr. Brent Grossman 06/05/2023. Patient observed to be alert and oriented x 3, sitting up in bedside chair. She denies chest pain, shortness of breath, nausea or vomiting at this time. Per patient numbness in right lower extremity is much improved since yesterday. She has been up ambulating in hallway with physical therapy and has been voiding without difficulty. Her dressing to her right hip is observed to be clean, dry and intact. She states that her pain has been controlled throughout the night. Reminded patient of postoperative showering instructions, encouraged continued use of incentive spirometry, encouraged hourly ambulating and icing to assist with pain and stiffness, encouraged daytime asael hose wear to assist with swelling. Reminded patient to call clinic if dressing becomes more than 50 percent saturated with blood or if dressing begins to lift. Patient verbalized understanding of all information provided. Patient has all required DME at home , a support system in place and will discharge home with home physical therapy. Her medications have been sent to her pharmacy of choice. She has cleared PT/OT safe for discharge. Will follow patient postoperatively.

## 2023-06-06 NOTE — OP NOTE
and excellent balance of the hip. Implanted the definitive component, re-trialed, happiest with the -25, cleaned the trunnion, packed on again reducing the hip, again confirming leg lengths were anatomic. Dilute Betadine protocol. Routine closure and no complications. The patient tolerated the procedure well. Blood loss less than 300. Excellent outcome of the case. I was very pleased.         Dennis White MD [id number]  D:  06/05/2023 9:11  T:  06/05/2023 20:32  JOB #:  [id number]

## 2023-06-06 NOTE — DISCHARGE INSTRUCTIONS
{PATIENT PARENT GUARDIAN:[id number]} verbalized understanding. Discharge medications reviewed with the {Arabella meds reviewed CGGI:71991} and appropriate educational materials and side effects teaching were provided.   ___________________________________________________________________________________________________________________________________

## 2023-06-06 NOTE — PLAN OF CARE
Problem: Physical Therapy - Adult  Goal: By Discharge: Performs mobility at highest level of function for planned discharge setting. See evaluation for individualized goals. Description: Physical Therapy Goals:  Initiated 6/5/2023 to be met within 7-10 days. 1.  Patient will move from supine to sit and sit to supine  in bed with modified independence. 2.  Patient will transfer from bed to chair and chair to bed with modified independence using the least restrictive device. 3.  Patient will perform sit to stand with modified independence. 4.  Patient will ambulate with modified independence for 300 feet with the least restrictive device. PLOF: Pt lives alone but sister will be staying with her in [facility], no MANNY. Indep PTA. Outcome: Adequate for Discharge   PHYSICAL THERAPY TREATMENT/DISCHARGE    Patient: Justina Patrick (79 y.o. female)  Date: 6/6/2023  Diagnosis: Osteoarthritis of right hip, unspecified osteoarthritis type [M16.11]  Arthritis, hip [M16.10] Arthritis of right hip  Procedure(s) (LRB):  RIGHT TOTAL HIP ARTHROPLASTY LATERAL (Right) 1 Day Post-Op  Precautions: Fall Risk, Weight Bearing, Right Lower Extremity Weight Bearing: Weight Bearing As Tolerated Hip Precautions: No hip flexion > 90 degrees, No ADduction, No hip internal rotation      ASSESSMENT:  Pt cleared to participate in PT session, pt received sitting up in recliner and agreeable to therapy session with sister at bedside. Pt continues to report lateral RLE numbness but demonstrates good LE strength. Pt standing with Parveen to RW, ambulating x300 feet with slow gait speed with RW and Parveen. Ascending/descending 4 steps with step to gait pattern and Parveen. Pt educated on RW fit. Pt returning to sitting in recliner, no further questions or concern regarding mobility. Pt positioned for comfort and educated to call for assist before getting up, pt verbalized understanding. Pt left with all needs met and call bell in reach.  RN notified
assistance;Assist X1;Additional time  Sit to Supine: Minimum assistance;Assist X1;Additional time  Transfers:     Transfer Training  Transfer Training: Yes  Sit to Stand: Moderate assistance;Assist X1  Stand to Sit: Moderate assistance;Assist X1  Balance:        Balance  Sitting: Impaired  Sitting - Static: Good (unsupported)  Sitting - Dynamic: Fair (occasional)  Standing: Impaired  Standing - Static: Fair  Standing - Dynamic: Fair;Occasional  Pain:  Pain level pre-treatment: 5/10   Pain level post-treatment: 5/10   Pain Intervention(s): Medication (see MAR); Rest, Ice, Repositioning  Response to intervention: Nurse notified, See doc flow    Activity Tolerance:   Activity Tolerance: Patient limited by fatigue  Please refer to the flowsheet for vital signs taken during this treatment.     After treatment:   []         Patient left in no apparent distress sitting up in chair  [x]         Patient left in no apparent distress in bed  [x]         Call bell left within reach  [x]         Nursing notified  [x]         Caregiver present  []         Bed alarm activated  []         SCDs applied    COMMUNICATION/EDUCATION:   Patient Education  Education Given To: Patient  Education Provided: Role of Therapy;Plan of Care;Home Exercise Program  Education Method: Demonstration;Verbal;Teach Back  Barriers to Learning: None  Education Outcome: Verbalized understanding    Thank you for this referral.  Ross Moser, PT  Minutes: 24      Eval Complexity: Decision Making: Medium Complexity

## 2023-06-06 NOTE — CARE COORDINATION
06/05/23 1258   IMM Letter   IMM Letter given to Patient/Family/Significant other/Guardian/POA/by: SW provided IMM letter to patient sister at bedside - signed and copy provided; placed in chart.    IMM Letter date given: 06/05/23   IMM Letter time given: [facility]   Case Management
SW made referral to [facility] at patient and family request.    Patient accepted to [facility], with anticipated start of care on 6/7/23. Per family, all DME has been ordered to home. Family to transport home; pending disposition and transportation needs.     Toshia Solorzano, PAT   Case Management
SW made referral to [facility] at patient and family request.     Patient accepted to [facility], with anticipated start of care on 6/7/23. Per family, all DME has been ordered to home. Family to transport home.      Caitlin Conte, PAT   Case Management
commode)   ADL Assistance Independent   Homemaking Assistance Independent   Ambulation Assistance Independent   Transfer Assistance Independent   Discharge Planning   Type of [place] Alone   Potential Assistance Needed Outpatient PT/OT   Potential Assistance Purchasing Medications No   Patient expects to be discharged to: [facility]   Mode of Transport at Discharge   (Family - Marni Contreras sister)   Confirm Follow Up Transport Family   Condition of Participation: Discharge Planning   The Plan for Transition of Care is related to the following treatment goals: Discharge home w/appropriate resources in place   The Patient and/or Patient Representative was provided with a Choice of Provider? Patient Representative   Name of the Patient Representative who was provided with the Choice of Provider and agrees with the Discharge Plan? Marni Contreras - Sister   The Patient and/Or Patient Representative agree with the Discharge Plan? Yes   Freedom of Choice list was provided with basic dialogue that supports the patient's individualized plan of care/goals, treatment preferences, and shares the quality data associated with the providers? Yes     Per family, patient has had all DME ordered and prefers to have [facility] upon D/C. Family also reports they will be transporting patient home.      Ky Taylor LMSW   Case Management

## 2023-06-06 NOTE — DISCHARGE SUMMARY
6/5/2023  5:41 AM    6/6/2023, 10:09 AM    Primary Dx:right Orthopedic / Rheumatologic: Total Hip Replacement  Secondary Dx: Etiological Diagnoses: none    HPI:  Pt has end stage OA of their right hip and had failed conservative treatment. Due to the current findings and affected activity of daily living surgical intervention is indicated. The alternatives, risks, complications as well as expected outcome were discussed, the patient understands and wishes to proceed with surgery    Past Medical History:   Diagnosis Date    Ankle fracture 09/2020    no cast or sx    Arthritis     Rheumatoid arthritis    Ganglion 10/2019    Glaucoma     Hypertension     Osteoporosis     Rheumatoid arthritis ([facility] [address]     Dr. Esther Rosas.   Cimzia infusions monthly         Current Facility-Administered Medications:     oxyCODONE-acetaminophen (PERCOCET) 7.5-325 MG per tablet 1 tablet, 1 tablet, Oral, Q6H PRN, Keara Dailey MD    bupivacaine liposome (EXPAREL) 1.3 % injection 66.5 mg, 5 mL, SubCUTAneous, Once, Jose Hollins PA-C    amLODIPine (NORVASC) tablet 5 mg, 5 mg, Oral, Daily, Keara Dailey MD, 5 mg at 06/06/23 0933    dorzolamide-timolol (COSOPT) 22.3-6.8 MG/ML ophthalmic solution 1 drop, 1 drop, Both Eyes, BID, Keara Dailey MD, 1 drop at 06/05/23 2147    0.9 % sodium chloride infusion, , IntraVENous, Continuous, Keara Dailey MD, Stopped at 06/06/23 0835    sodium chloride flush 0.9 % injection 5-40 mL, 5-40 mL, IntraVENous, 2 times per day, Keara Dailey MD, 10 mL at 06/05/23 2147    sodium chloride flush 0.9 % injection 5-40 mL, 5-40 mL, IntraVENous, PRN, Keara Dailey MD    0.9 % sodium chloride infusion, , IntraVENous, PRN, Keara Dailey MD    ketorolac (TORADOL) injection 15 mg, 15 mg, IntraVENous, Q6H, Keara Dailey MD    sennosides-docusate sodium (SENOKOT-S) 8.6-50 MG tablet 1 tablet, 1 tablet, Oral, BID, Keara Dailey MD, 1 tablet at 06/06/23 0933    polyethylene glycol (GLYCOLAX) packet 17 g, 17 g, Oral,

## 2023-06-08 ENCOUNTER — TELEPHONE (OUTPATIENT)
Facility: HOSPITAL | Age: 68
End: 2023-06-08

## 2023-06-08 NOTE — TELEPHONE ENCOUNTER
Call placed to patient, ID verified x 2. Patient s/p right total hip replacement with Dr. Judd Barthel, [facility] 06/05/2023. She denies chest pain, shortness of breath, nausea, vomiting, fever or chills. She states that her pain is well controlled taking her pain medication only as needed and icing. She denies any numbness or tingling to her right lower extremity, she denies any difficulty with bowel or bladder. She states that she is up ambulating with her walker hourly and that her dressing remains clean, dry and intact. Home physical therapy has been out working with her. Overall she feels she is doing very well. She has no questions or concerns at this time. She will follow up with Dr. Judd Barthel in two weeks or sooner if needed. Epidermal Autograft Text: The defect edges were debeveled with a #15 scalpel blade.  Given the location of the defect, shape of the defect and the proximity to free margins an epidermal autograft was deemed most appropriate.  Using a sterile surgical marker, the primary defect shape was transferred to the donor site. The epidermal graft was then harvested.  The skin graft was then placed in the primary defect and oriented appropriately.

## 2023-06-22 ENCOUNTER — OFFICE VISIT (OUTPATIENT)
Age: 68
End: 2023-06-22

## 2023-06-22 DIAGNOSIS — Z96.641 PRESENCE OF RIGHT ARTIFICIAL HIP JOINT: Primary | ICD-10-CM

## 2023-06-22 DIAGNOSIS — Z48.02 ENCOUNTER FOR STAPLE REMOVAL: ICD-10-CM

## 2023-06-22 PROCEDURE — 99024 POSTOP FOLLOW-UP VISIT: CPT | Performed by: PHYSICIAN ASSISTANT

## 2023-06-22 NOTE — PROGRESS NOTES
Patient: Elinor Banerjee                MRN: [id number]       SSN: [id number]  YOB: 1955        AGE: 76 y.o. SEX: female  There is no height or weight on file to calculate BMI. PCP: Praful Rg MD  06/22/23      This office note has been dictated. REVIEW OF SYSTEMS:  Constitutional: Negative for fever, chills, weight loss and malaise/fatigue. HENT: Negative. Eyes: Negative. Respiratory: Negative. Cardiovascular: Negative. Gastrointestinal: No bowel incontinence or constipation. Genitourinary: No bladder incontinence or saddle anesthesia. Skin: Negative. Neurological: Negative. Endo/Heme/Allergies: Negative. Psychiatric/Behavioral: Negative. Musculoskeletal: As per HPI above. Past Medical History:   Diagnosis Date    Ankle fracture 09/2020    no cast or sx    Arthritis     Rheumatoid arthritis    Ganglion 10/2019    Glaucoma     Hypertension     Osteoporosis     Rheumatoid arthritis ([facility] [address]     Dr. Jak Sroiano.   Cimzia infusions monthly         Current Outpatient Medications:     aspirin EC 81 MG EC tablet, Take 1 tablet by mouth 2 times daily, Disp: 60 tablet, Rfl: 1    ondansetron (ZOFRAN) 4 MG tablet, Take 1 tablet by mouth every 8 hours as needed for Nausea or Vomiting, Disp: 30 tablet, Rfl: 2    docusate sodium (COLACE) 100 MG capsule, Take 1 capsule by mouth 2 times daily, Disp: 60 capsule, Rfl: 1    Cholecalciferol (VITAMIN D3) 50 MCG (2000 UT) CAPS, Take by mouth daily, Disp: , Rfl:     Multiple Vitamins-Minerals (THERAPEUTIC MULTIVITAMIN-MINERALS) tablet, Take 1 tablet by mouth daily, Disp: , Rfl:     Teriparatide, Recombinant, (FORTEO SC), Inject into the skin daily, Disp: , Rfl:     amLODIPine (NORVASC) 5 MG tablet, Take 1 tablet by mouth daily, Disp: 90 tablet, Rfl: 3    lisinopril-hydroCHLOROthiazide (PRINZIDE;ZESTORETIC) 20-12.5 MG per tablet, Take 1 tablet by mouth daily, Disp: 90 tablet, Rfl: 3    Abatacept (ORENCIA CLICKJECT) 714 MG/ML

## 2023-06-28 ENCOUNTER — TELEPHONE (OUTPATIENT)
Age: 68
End: 2023-06-28

## 2023-06-28 DIAGNOSIS — Z96.641 PRESENCE OF RIGHT ARTIFICIAL HIP JOINT: Primary | ICD-10-CM

## 2023-06-28 NOTE — TELEPHONE ENCOUNTER
Patient called again is requesting that her Physical Therapy order be [facility] and would like for it to be sent to [facility]. Please call and advise patient when order is sent and updated.   [phone]

## 2023-06-28 NOTE — TELEPHONE ENCOUNTER
Patient called and said  that the referral for her Right Hip was sent to the [facility]. That it was sent to the location in [place], but she would like to go to the hospitals location. Patient is asking that the Physcial therapy order [place] [address] [place] [address] be sent to : [facility] at the hospitals location tel. [phone]  Fax [phone]. Patient would like a call once this is done at   tel. [phone].

## 2023-07-03 ENCOUNTER — HOSPITAL ENCOUNTER (OUTPATIENT)
Facility: HOSPITAL | Age: 68
Setting detail: RECURRING SERIES
Discharge: HOME | End: 2023-07-06
Payer: MEDICARE

## 2023-07-03 PROCEDURE — 97110 THERAPEUTIC EXERCISES: CPT

## 2023-07-03 PROCEDURE — 97535 SELF CARE MNGMENT TRAINING: CPT

## 2023-07-03 PROCEDURE — 97161 PT EVAL LOW COMPLEX 20 MIN: CPT

## 2023-07-03 NOTE — PROGRESS NOTES
PT DAILY TREATMENT NOTE/LOWER QUARTER EVAL     Patient Name: Christopher Martinez    Date: 7/3/2023    : 1955  Insurance: Payor: MEDICARE / Plan: MEDICARE PART A AND B / Product Type: *No Product type* /      Patient  verified yes     Visit #   Current / Total 1 24   Time   In / Out 11:38A 12:21P   Pain   In / Out 0 \"sore\" 0 \"sore\"     Treatment Area: Pain in right hip [M25.551]  SUBJECTIVE  Pain Level (0-10 scale): 0 \"sore\"  []constant []intermittent [x]improving []worsening []no change since onset    Any medication changes, allergies to medications, adverse drug reactions, diagnosis change, or new procedure performed?: [x] No    [] Yes (see summary sheet for update)  Subjective functional status/changes:     PLOF: Ind/pain free performing ADL/IADLs, self care tasks, recreational participation (enjoys walking) and  ambulating/standing prolonged durations  Limitations to PLOF: Post Op \"soreness/stiffness\", Decreased Activity Tolerance, AD Use  Mechanism of Injury: Patient presents to clinic s/p right MIKE (lateral approach) performed on 23. Participated in [facility] PT from 23 - 23, in which she has advanced from [facility] to [facility]. Arrives to OP PT w/ goal of continuing to build functional strength, mobility and ind to return to to performing all activities/ambulation w/o need for AD. Current symptoms: Describes discomfort at right hip as more \"soreness/stiffness\" than actual pain. States able to manage this with over the counter tylenol as needed. States strength, stability and balance as more of issues at this time as she would like to return to walking without the need for AD. PMHx/Surgical Hx: See POC   Work Hx: Retired  Living Situation: Lives alone in 1st floor of [facility] w/ no MANNY (sister lives in same complex and has been assisting patient as needed). Pt Goals: \"get quality of life back\"    OBJECTIVE/EXAMINATION     19 min [x]Eval        - untimed        Therapeutic Procedures:   Tx
increase FOTO score to >/= 56 points to indicate increased functional mobility. Evaluation: 26 points    3. Patient will demonstrate right hip flex MMT >/= 4+/5 to perform ADL/IADLs with greater ease   Evaluation: NT secondary to post op precautions     4. Patient will ambulate >/= 1,000 ft, on even/uneven surfaces, w/ no AD/independenlty with no increase in ss/sx in order to demonstrate a return to PLOF   Evaluation: SPC w/ Sup(A), decreased valdez; reports increased soreness, however no pain        Frequency / Duration: Patient to be seen 2-3 times per week for 8 weeks    Patient/ Caregiver education and instruction: Diagnosis, prognosis, self care, activity modification, and exercises [x]  Plan of care has been reviewed with PTA    Certification Period: 07/03/23 - 09/03/23    Nishant Jimenez, PT       7/3/2023       9:32 AM    Payor: MEDICARE / Plan: MEDICARE PART A AND B / Product Type: *No Product type* /     Physician signature required for Medicare, Medicaid, Worker's Comp, Direct Access   ===================================================================  I certify that the above Therapy Services are being furnished while the patient is under my care. I agree with the treatment plan and certify that this therapy is necessary. Physician's Signature:_________________________   DATE:_________   TIME:________                           Mariela Gonzales PA-C    ** Signature, Date and Time must be completed for valid certification **  [facility]  Thank you

## 2023-07-07 ENCOUNTER — HOSPITAL ENCOUNTER (OUTPATIENT)
Facility: HOSPITAL | Age: 68
Setting detail: RECURRING SERIES
Discharge: HOME | End: 2023-07-10
Payer: MEDICARE

## 2023-07-07 PROCEDURE — 97530 THERAPEUTIC ACTIVITIES: CPT

## 2023-07-07 PROCEDURE — 97110 THERAPEUTIC EXERCISES: CPT

## 2023-07-10 ENCOUNTER — TELEPHONE (OUTPATIENT)
Age: 68
End: 2023-07-10

## 2023-07-10 NOTE — TELEPHONE ENCOUNTER
Patient's sister Marie Dunn called and stated that the patient had sx with Dr. Yasmin Johnson on 06/05 and came in for a f/u on 06/22 and staples were removed. They just recently noticed a staple still in the incision when the dressing/strips came off. The patient is very concerned about it and is asking to come in today and have it removed. Please advise. Patient can be reached at [phone].     Marie Dunn (sister) : [phone]

## 2023-07-10 NOTE — TELEPHONE ENCOUNTER
Spoke to patient and informed patient of CLEMENTINA Caro message and she states she will be in tomorrow at the [facility] location to get that one staple remove.

## 2023-07-11 ENCOUNTER — OFFICE VISIT (OUTPATIENT)
Age: 68
End: 2023-07-11
Payer: MEDICARE

## 2023-07-11 VITALS — WEIGHT: 173 LBS | BODY MASS INDEX: 28.82 KG/M2 | HEIGHT: 65 IN | TEMPERATURE: 98.9 F

## 2023-07-11 DIAGNOSIS — Z96.641 PRESENCE OF RIGHT ARTIFICIAL HIP JOINT: Primary | ICD-10-CM

## 2023-07-11 DIAGNOSIS — Z48.89 ENCOUNTER FOR POSTOPERATIVE WOUND CHECK: ICD-10-CM

## 2023-07-11 DIAGNOSIS — Z48.02: ICD-10-CM

## 2023-07-11 PROCEDURE — 99024 POSTOP FOLLOW-UP VISIT: CPT | Performed by: PHYSICIAN ASSISTANT

## 2023-07-11 PROCEDURE — 73502 X-RAY EXAM HIP UNI 2-3 VIEWS: CPT | Performed by: PHYSICIAN ASSISTANT

## 2023-07-11 NOTE — PROGRESS NOTES
Supriya Miller returns to the office just over a month status post her primary right total hip replacement. Her surgery date was 6/6/2023 Miroslava she was here in the office under the care of Bettye Cervantes on 6/22/2023 at which time this the wound was addressed and staples were removed. Unfortunately patient within the past 24 hours found a retained staple and phone the office for recommendations of. Patient walking assisted with a single post cane weightbearing as tolerated of the right lower extremity. Pain is well controlled on her oxycodone. Patient is continuing her thromboembolism prophylaxis per protocol. The surgical site examined associated with the right hip craniocaudal with patient laying left recumbent and draped appropriately measuring [id number] 20 cm. 8 cm from the proximal pole distal there is a single surgical staple noted with surrounding tissue soft no erythema no evidence of induration or fluctuance. Overall the surgical site is benign with wound borders well approximated and no surrounding fluctuance induration or evidence of infection. Quad and femoral nerve activity full right lower extremity. Passive internal/external rotation of the right hip at 15 and 10 degrees respectively with guarding but no pain. Procedural: Using clean technique the single staple was removed with no complications. [facility] pelvis crosstable lateral right hip reveals total joint prosthesis intact with no evidence of periprosthetic fracturing or dislocation. There appears to be in the right and left abdomen retained surgical clips of unknown age. No staple material visible associated with the external surgical sites from the right primary total hip replacement. Plan: Patient will continue pain medication as needed. Continue thromboembolism prophylaxis. Follow-up with Dr. Krishna Xiao per protocol.   Continue outpatient therapy per

## 2023-07-19 ENCOUNTER — OFFICE VISIT (OUTPATIENT)
Age: 68
End: 2023-07-19

## 2023-07-19 VITALS — WEIGHT: 173 LBS | BODY MASS INDEX: 28.79 KG/M2

## 2023-07-19 DIAGNOSIS — Z96.641 PRESENCE OF RIGHT ARTIFICIAL HIP JOINT: Primary | ICD-10-CM

## 2023-07-19 PROCEDURE — 99024 POSTOP FOLLOW-UP VISIT: CPT | Performed by: PHYSICIAN ASSISTANT

## 2023-07-21 ENCOUNTER — HOSPITAL ENCOUNTER (OUTPATIENT)
Facility: HOSPITAL | Age: 68
Setting detail: RECURRING SERIES
Discharge: HOME | End: 2023-07-24
Payer: MEDICARE

## 2023-07-21 PROCEDURE — 97530 THERAPEUTIC ACTIVITIES: CPT

## 2023-07-21 PROCEDURE — 97110 THERAPEUTIC EXERCISES: CPT

## 2023-07-21 PROCEDURE — 97112 NEUROMUSCULAR REEDUCATION: CPT

## 2023-07-21 NOTE — PROGRESS NOTES
due N/A    PLAN  Yes  Continue plan of care  []  Upgrade activities as tolerated  []  Discharge due to :  []  Other:    Odessia Peppers, PTA    7/21/2023    8:32 AM    Future Appointments   Date Time Provider [address]   7/21/2023 11:30 AM Odessia Peppers, PTA [facility]   7/24/2023 11:30 AM Odessia Peppers, PTA [facility]   7/26/2023 10:50 AM Odessia Peppers, PTA [facility]   7/28/2023 10:50 AM Crystal Elliot, PTA [facility]   7/31/2023 10:50 AM Crystal Elliot, PTA [facility]   8/2/2023 10:50 AM Crystal Elliot, PTA [facility]   8/4/2023 10:50 AM Crystal Elliot, PTA [facility]   8/7/2023 10:50 AM Crystal Elliot, PTA [facility]   8/9/2023 10:50 AM Crystal Elliot, PTA [facility]   8/11/2023 10:50 AM Crystal Elliot, PTA [facility]   8/15/2023 10:50 AM Odessia Peppers, PTA [facility]   8/17/2023 10:50 AM Odessia Peppers, PTA [facility]   8/18/2023 10:50 AM Odessia Peppers, PTA [facility]   8/18/2023  1:30 PM Danish Sanchez MD [facility] BS AMB   10/10/2023 10:40 AM Darvin Walker MD [facility] BS AMB

## 2023-07-24 ENCOUNTER — HOSPITAL ENCOUNTER (OUTPATIENT)
Facility: HOSPITAL | Age: 68
Setting detail: RECURRING SERIES
Discharge: HOME | End: 2023-07-27
Payer: MEDICARE

## 2023-07-24 PROCEDURE — 97110 THERAPEUTIC EXERCISES: CPT

## 2023-07-24 PROCEDURE — 97530 THERAPEUTIC ACTIVITIES: CPT

## 2023-07-24 PROCEDURE — 97140 MANUAL THERAPY 1/> REGIONS: CPT

## 2023-07-24 NOTE — PROGRESS NOTES
PHYSICAL / OCCUPATIONAL THERAPY - DAILY TREATMENT NOTE (updated )    Patient Name: Gracie Rubio    Date: 2023    : 1955  Insurance: Payor: MEDICARE / Plan: MEDICARE PART A AND B / Product Type: *No Product type* /      Patient  verified Yes     Visit #   Current / Total 4 24   Time   In / Out 1131 am 1220 pm    Pain   In / Out 0/10  0/10   Subjective Functional Status/Changes: Patient reports that she has been trying to walk more without the cane at home. \"I know I'm still wobbling but I know that will get better. \"      TREATMENT AREA =  Pain in right hip [M25.551]    OBJECTIVE    Modalities Rationale:     decrease edema, decrease inflammation, decrease pain, and increase tissue extensibility to improve patient's ability to progress to PLOF and address remaining functional goals. min [] Estim Unattended, type/location:                                      []  w/ice    []  w/heat    min [] Estim Attended, type/location:                                     []  w/US     []  w/ice    []  w/heat    []  TENS insruct      min []  Mechanical Traction: type/lbs                   []  pro   []  sup   []  int   []  cont    []  before manual    []  after manual    min []  Ultrasound, settings/location:      min []  Iontophoresis w/ dexamethasone, location:                                               []  take home patch       []  in clinic   5 min  unbill [x]  Ice     []  Heat    location/position: Reclined to right hip     min []  Paraffin,  details:     min []  Vasopneumatic Device, press/temp:     min []  Mejia Marisela / Jkaen Boss: If using vaso (only need to measure limb vaso being performed on)      pre-treatment girth :       post-treatment girth :       measured at (landmark location) :      min []  Other:    Skin assessment post-treatment (if applicable):    []  intact    []  redness- no adverse reaction                 []redness - adverse reaction:         Therapeutic Procedures:     Tx Min

## 2023-07-26 ENCOUNTER — HOSPITAL ENCOUNTER (OUTPATIENT)
Facility: HOSPITAL | Age: 68
Setting detail: RECURRING SERIES
Discharge: HOME | End: 2023-07-29
Payer: MEDICARE

## 2023-07-26 PROCEDURE — 97140 MANUAL THERAPY 1/> REGIONS: CPT

## 2023-07-26 PROCEDURE — 97110 THERAPEUTIC EXERCISES: CPT

## 2023-07-26 PROCEDURE — 97530 THERAPEUTIC ACTIVITIES: CPT

## 2023-07-26 NOTE — PROGRESS NOTES
decreased valdez; reports increased soreness, however no pain    current: 200ft with SC left  slow valdez  7/7/23       Next PN/ RC due 8/3/2023;9/3/2023   Auth due N/A    PLAN  Yes  Continue plan of care  []  Upgrade activities as tolerated  []  Discharge due to :  []  Other:    Euel Gilbert, PTA    7/26/2023    9:47 AM    Future Appointments   Date Time Provider [address]   7/26/2023 10:50 AM Euel Gilbert, PTA [facility]   7/28/2023 10:50 AM Crystal Elliot, PTA [facility]   7/31/2023 10:50 AM Crystal Elliot, PTA [facility]   8/2/2023 10:50 AM Crystal Elliot, PTA [facility]   8/4/2023 10:50 AM Crystal Elliot, PTA [facility]   8/7/2023 10:50 AM Crystal Elliot, PTA [facility]   8/9/2023 10:50 AM Crystal Elliot, PTA [facility]   8/11/2023 10:50 AM Crystal Elliot, PTA [facility]   8/15/2023 10:50 AM Euel Gilbert, PTA [facility]   8/17/2023 10:50 AM Euel Gilbert, PTA [facility]   8/18/2023 10:50 AM Euel Gilbert, PTA [facility]   8/18/2023  1:30 PM Myesha Walker MD [facility] BS AMB   10/10/2023 10:40 AM Juan Brand MD [facility] BS AMB

## 2023-07-28 ENCOUNTER — TELEPHONE (OUTPATIENT)
Facility: HOSPITAL | Age: 68
End: 2023-07-28

## 2023-07-28 ENCOUNTER — APPOINTMENT (OUTPATIENT)
Facility: HOSPITAL | Age: 68
End: 2023-07-28
Payer: MEDICARE

## 2023-07-28 NOTE — TELEPHONE ENCOUNTER
Patient cancelled appt. on 7/28/23 at 8:30am due to the patient not feeling well this morning and she is not able to make her appt. today.

## 2023-07-31 ENCOUNTER — HOSPITAL ENCOUNTER (OUTPATIENT)
Facility: HOSPITAL | Age: 68
Setting detail: RECURRING SERIES
Discharge: HOME | End: 2023-08-03
Payer: MEDICARE

## 2023-07-31 PROCEDURE — 97140 MANUAL THERAPY 1/> REGIONS: CPT

## 2023-07-31 PROCEDURE — 97110 THERAPEUTIC EXERCISES: CPT

## 2023-07-31 PROCEDURE — 97530 THERAPEUTIC ACTIVITIES: CPT

## 2023-07-31 NOTE — PROGRESS NOTES
improve patient's ability to progress to PLOF and address remaining functional goals. (see flow sheet as applicable)     Details if applicable:       15  30052 Therapeutic Activity (timed):  use of dynamic activities replicating functional movements to increase ROM, strength, coordination, balance, and proprioception in order to improve patient's ability to progress to PLOF and address remaining functional goals. (see flow sheet as applicable)     Details if applicable:     10  57106 Manual Therapy (timed):  decrease pain, increase ROM, and increase tissue extensibility to improve patient's ability to progress to PLOF and address remaining functional goals. The manual therapy interventions were performed at a separate and distinct time from the therapeutic activities interventions . (see flow sheet as applicable)     Details if applicable:  STM/effleruage  right hip/upper thigh  semi relclined          Details if applicable:            Details if applicable:     39  [facility] Totals Reminder: bill using total billable min of TIMED therapeutic procedures (example: do not include dry needle or estim unattended, both untimed codes, in totals to left)  8-22 min = 1 unit; 23-37 min = 2 units; 38-52 min = 3 units; 53-67 min = 4 units; 68-82 min = 5 units   Total Total     [x]  Patient Education billed concurrently with other procedures   [x] Review HEP    [] Progressed/Changed HEP, detail:    [] Other detail:       Objective Information/Functional Measures/Assessment  Pt is currently ambulating with SC on left with slight antalgic gait due to tightness at right hip. Pt presents with tightness at right upper quads during manual and tenderness at right incision. Overall pt completed each there ex well with no pain.       Patient will continue to benefit from skilled PT / OT services to modify and progress therapeutic interventions, analyze and address functional mobility deficits, analyze and address ROM deficits, analyze and

## 2023-08-02 ENCOUNTER — HOSPITAL ENCOUNTER (OUTPATIENT)
Facility: HOSPITAL | Age: 68
Setting detail: RECURRING SERIES
Discharge: HOME | End: 2023-08-05
Payer: MEDICARE

## 2023-08-02 PROCEDURE — 97110 THERAPEUTIC EXERCISES: CPT

## 2023-08-02 PROCEDURE — 97530 THERAPEUTIC ACTIVITIES: CPT

## 2023-08-02 PROCEDURE — 97140 MANUAL THERAPY 1/> REGIONS: CPT

## 2023-08-02 NOTE — PROGRESS NOTES
PHYSICAL / OCCUPATIONAL THERAPY - DAILY TREATMENT NOTE (updated )    Patient Name: Yu Cortes    Date: 2023    : 1955  Insurance: Payor: MEDICARE / Plan: MEDICARE PART A AND B / Product Type: *No Product type* /      Patient  verified Yes     Visit #   Current / Total 7 24   Time   In / Out 10:50 11:49   Pain   In / Out 0 0   Subjective Functional Status/Changes: \"Im feeling good. TREATMENT AREA =  Pain in right hip [M25.551]    OBJECTIVE         Therapeutic Procedures: Tx Min Billable or 1:1 Min (if diff from Tx Min) Procedure, Rationale, Specifics   22  21421 Therapeutic Exercise (timed):  increase ROM, strength, coordination, balance, and proprioception to improve patient's ability to progress to PLOF and address remaining functional goals. (see flow sheet as applicable)     Details if applicable:       29  08651 Therapeutic Activity (timed):  use of dynamic activities replicating functional movements to increase ROM, strength, coordination, balance, and proprioception in order to improve patient's ability to progress to PLOF and address remaining functional goals. (see flow sheet as applicable)     Details if applicable:  FOTO/REASSESS   8  46417 Manual Therapy (timed):  decrease pain, increase ROM, and increase tissue extensibility to improve patient's ability to progress to PLOF and address remaining functional goals. The manual therapy interventions were performed at a separate and distinct time from the therapeutic activities interventions .  (see flow sheet as applicable)     Details if applicable:  STM/effleruage  right hip/upper  right guad          Details if applicable:            Details if applicable:     61  [address] Totals Reminder: bill using total billable min of TIMED therapeutic procedures (example: do not include dry needle or estim unattended, both untimed codes, in totals to left)  8-22 min = 1 unit; 23-37 min = 2 units; 38-52 min = 3 units; 53-67 min = 4 units;
mobility. Evaluation: 26 points  Current: 42   8/2/2023 progressing     3. Patient will demonstrate right hip flex MMT >/= 4+/5 to perform ADL/IADLs with greater ease   Evaluation: 4    8/2/23 progressing     4. Patient will ambulate >/= 1,000 ft, on even/uneven surfaces, w/ no AD/independenlty with no increase in ss/sx in order to demonstrate a return to PLOF   Evaluation: SPC w/ Sup(A), decreased valdez; reports increased soreness, however no pain    current: 200ft with SC left  slow valdez  7/7/23    Functional Gains: stand for short period, walk short distance  Functional Deficits: standing long period, walking long distance  % improvement: 50%  Pain   Average: 0/10       Best: 0/10     Worst: 0/10  Patient Goal: \"walk without cane\"    Payor: MEDICARE / Plan: MEDICARE PART A AND B / Product Type: *No Product type* /     Medicare, cannot change goals, cannot adjust frequency/duration, no signature required   Reporting Period: (date from last Prog Note/Eval to current Prog Note/Recert)  0/6/44 - 3/8/35    RECOMMENDATIONS  Continue therapy per initial Plan of Care or most recent Medicare Recert. If you have any questions/comments please contact us directly. Thank you for allowing us to assist in the care of your patient.     Summer Zarate, PTA       8/2/2023       12:11 PM

## 2023-08-04 ENCOUNTER — HOSPITAL ENCOUNTER (OUTPATIENT)
Facility: HOSPITAL | Age: 68
Setting detail: RECURRING SERIES
Discharge: HOME | End: 2023-08-07
Payer: MEDICARE

## 2023-08-04 PROCEDURE — 97530 THERAPEUTIC ACTIVITIES: CPT

## 2023-08-04 PROCEDURE — 97116 GAIT TRAINING THERAPY: CPT

## 2023-08-04 PROCEDURE — 97110 THERAPEUTIC EXERCISES: CPT

## 2023-08-04 NOTE — PROGRESS NOTES
PHYSICAL / OCCUPATIONAL THERAPY - DAILY TREATMENT NOTE (updated )    Patient Name: Zaria Alfonso    Date: 2023    : 1955  Insurance: Payor: MEDICARE / Plan: MEDICARE PART A AND B / Product Type: *No Product type* /      Patient  verified Yes     Visit #   Current / Total 8 24   Time   In / Out 10:59 11:38   Pain   In / Out 0 0   Subjective Functional Status/Changes: \"I 'm feeling pain at my left leg. \"     TREATMENT AREA =  Pain in right hip [M25.551]    OBJECTIVE         Therapeutic Procedures: Tx Min Billable or 1:1 Min (if diff from Tx Min) Procedure, Rationale, Specifics   16  24381 Therapeutic Exercise (timed):  increase ROM, strength, coordination, balance, and proprioception to improve patient's ability to progress to PLOF and address remaining functional goals. (see flow sheet as applicable)     Details if applicable:       13  02950 Therapeutic Activity (timed):  use of dynamic activities replicating functional movements to increase ROM, strength, coordination, balance, and proprioception in order to improve patient's ability to progress to PLOF and address remaining functional goals. (see flow sheet as applicable)     Details if applicable:     10  61068 Gait Training (timed):    120 feet with SC (assistive device) over level surfaces with SBA level of assist. Cuing for heel to toe gait pattern. To improve safety and dynamic movement with household/community ambulation.   (see flow sheet as applicable)     Details if applicable:            Details if applicable:            Details if applicable:     44  [facility] Totals Reminder: bill using total billable min of TIMED therapeutic procedures (example: do not include dry needle or estim unattended, both untimed codes, in totals to left)  8-22 min = 1 unit; 23-37 min = 2 units; 38-52 min = 3 units; 53-67 min = 4 units; 68-82 min = 5 units   Total Total     [x]  Patient Education billed concurrently with other procedures   [x] Review HEP

## 2023-08-07 ENCOUNTER — HOSPITAL ENCOUNTER (OUTPATIENT)
Facility: HOSPITAL | Age: 68
Setting detail: RECURRING SERIES
Discharge: HOME | End: 2023-08-10
Payer: MEDICARE

## 2023-08-07 PROCEDURE — 97530 THERAPEUTIC ACTIVITIES: CPT

## 2023-08-07 PROCEDURE — 97110 THERAPEUTIC EXERCISES: CPT

## 2023-08-07 PROCEDURE — 97116 GAIT TRAINING THERAPY: CPT

## 2023-08-07 NOTE — PROGRESS NOTES
PHYSICAL / OCCUPATIONAL THERAPY - DAILY TREATMENT NOTE (updated )    Patient Name: Néstor Castaneda    Date: 2023    : 1955  Insurance: Payor: MEDICARE / Plan: MEDICARE PART A AND B / Product Type: *No Product type* /      Patient  verified Yes     Visit #   Current / Total 9 24   Time   In / Out 10:55 11:40   Pain   In / Out 0 0   Subjective Functional Status/Changes: \"Im alive and well. \"\"Hip is still attached. \"     TREATMENT AREA =  Pain in right hip [M25.551]    OBJECTIVE         Therapeutic Procedures: Tx Min Billable or 1:1 Min (if diff from Tx Min) Procedure, Rationale, Specifics   20  19796 Therapeutic Exercise (timed):  increase ROM, strength, coordination, balance, and proprioception to improve patient's ability to progress to PLOF and address remaining functional goals. (see flow sheet as applicable)     Details if applicable:  increased rep per flow sheet     15  15802 Therapeutic Activity (timed):  use of dynamic activities replicating functional movements to increase ROM, strength, coordination, balance, and proprioception in order to improve patient's ability to progress to PLOF and address remaining functional goals. (see flow sheet as applicable)     Details if applicable:     10  88714 Gait Training (timed):    250 feet with no AD (assistive device) over level surfaces with SBA level of assist. Cuing for heel to toe gait pattern. To improve safety and dynamic movement with household/community ambulation.   (see flow sheet as applicable)     Details if applicable:            Details if applicable:            Details if applicable:     [address]  [address] Totals Reminder: bill using total billable min of TIMED therapeutic procedures (example: do not include dry needle or estim unattended, both untimed codes, in totals to left)  8-22 min = 1 unit; 23-37 min = 2 units; 38-52 min = 3 units; 53-67 min = 4 units; 68-82 min = 5 units   Total Total     [x]  Patient Education billed concurrently

## 2023-08-09 ENCOUNTER — HOSPITAL ENCOUNTER (OUTPATIENT)
Facility: HOSPITAL | Age: 68
Setting detail: RECURRING SERIES
Discharge: HOME | End: 2023-08-12
Payer: MEDICARE

## 2023-08-09 PROCEDURE — 97112 NEUROMUSCULAR REEDUCATION: CPT

## 2023-08-09 PROCEDURE — 97110 THERAPEUTIC EXERCISES: CPT

## 2023-08-09 PROCEDURE — 97530 THERAPEUTIC ACTIVITIES: CPT

## 2023-08-09 NOTE — PROGRESS NOTES
Patient will demonstrate right hip flex MMT >/= 4+/5 to perform ADL/IADLs with greater ease   Evaluation: 4    8/2/23     4.  Patient will ambulate >/= 1,000 ft, on even/uneven surfaces, w/ no AD/independenlty with no increase in ss/sx in order to demonstrate a return to PLOF   Evaluation: SPC w/ Sup(A), decreased valdez; reports increased soreness, however no pain    current: 200ft with SC left  slow valdez  8/7/23           Next PN/ RC due  9/3/23   RC  10/3/23  Auth due DANISHA      PLAN  Yes  Continue plan of care  []  Upgrade activities as tolerated  []  Discharge due to :  []  Other:    Keith Cyr PTA    8/9/2023    11:19 AM    Future Appointments   Date Time Provider [address]   8/11/2023 10:50 AM Summer Zarate, PTA [facility]   8/15/2023 10:50 AM Lubertha Ok, PTA [facility]   8/17/2023 10:50 AM Lubertha Ok, PTA [facility]   8/18/2023 10:50 AM Lubertha Ok, PTA [facility]   8/22/2023 10:40 AM Ashleigh Hayes PA-C [facility] BS AMB   10/10/2023 10:40 AM Annalee Rosenthal MD [facility] BS AMB

## 2023-08-11 ENCOUNTER — HOSPITAL ENCOUNTER (OUTPATIENT)
Facility: HOSPITAL | Age: 68
Setting detail: RECURRING SERIES
Discharge: HOME | End: 2023-08-14
Payer: MEDICARE

## 2023-08-11 PROCEDURE — 97112 NEUROMUSCULAR REEDUCATION: CPT

## 2023-08-11 PROCEDURE — 97110 THERAPEUTIC EXERCISES: CPT

## 2023-08-11 PROCEDURE — 97530 THERAPEUTIC ACTIVITIES: CPT

## 2023-08-11 NOTE — PROGRESS NOTES
Patient will increase FOTO score to >/= 56 points to indicate increased functional mobility. Evaluation: 26 points  Current: 42   8/2/2023     3. Patient will demonstrate right hip flex MMT >/= 4+/5 to perform ADL/IADLs with greater ease   Evaluation: 4    8/2/23     4.  Patient will ambulate >/= 1,000 ft, on even/uneven surfaces, w/ no AD/independenlty with no increase in ss/sx in order to demonstrate a return to PLOF   Evaluation: SPC w/ Sup(A), decreased vladez; reports increased soreness, however no pain    current: 200ft with SC left  slow valdez  8/7/23           Next PN/ RC due  9/3/23   RC  10/3/23  Auth due DANISHA         PLAN  Yes  Continue plan of care  []  Upgrade activities as tolerated  []  Discharge due to :  []  Other:    Vilma Wise, LEXI    8/11/2023    11:14 AM    Future Appointments   Date Time Provider [address]   8/15/2023 10:50 AM Peggi Ramila, PTA [facility]   8/17/2023 10:50 AM Peggi Ramila, PTA [facility]   8/18/2023 10:50 AM Tracey Thao, PT [facility]   8/22/2023 10:40 AM Scott Reese PA-C [facility] BS AMB   10/10/2023 10:40 AM Laurel Escalante MD [facility] BS AMB

## 2023-08-15 ENCOUNTER — TELEPHONE (OUTPATIENT)
Facility: HOSPITAL | Age: 68
End: 2023-08-15

## 2023-08-15 ENCOUNTER — HOSPITAL ENCOUNTER (OUTPATIENT)
Facility: HOSPITAL | Age: 68
Setting detail: RECURRING SERIES
End: 2023-08-15
Payer: MEDICARE

## 2023-08-15 NOTE — TELEPHONE ENCOUNTER
Patient cancelled appt. on 8/15/23 at 8:57am due to the patient stating her body is not cooperating this morning and she is not able to make her appt. today.

## 2023-08-17 ENCOUNTER — HOSPITAL ENCOUNTER (OUTPATIENT)
Facility: HOSPITAL | Age: 68
Setting detail: RECURRING SERIES
Discharge: HOME | End: 2023-08-20
Payer: MEDICARE

## 2023-08-17 PROCEDURE — 97112 NEUROMUSCULAR REEDUCATION: CPT

## 2023-08-17 PROCEDURE — 97530 THERAPEUTIC ACTIVITIES: CPT

## 2023-08-17 PROCEDURE — 97110 THERAPEUTIC EXERCISES: CPT

## 2023-08-17 NOTE — PROGRESS NOTES
PHYSICAL / OCCUPATIONAL THERAPY - DAILY TREATMENT NOTE (updated )    Patient Name: Denzel Sanchez    Date: 2023    : 1955  Insurance: Payor: MEDICARE / Plan: MEDICARE PART A AND B / Product Type: *No Product type* /      Patient  verified Yes     Visit #   Current / Total 12 24   Time   In / Out 1051 am  1130 am    Pain   In / Out 0/10 0/10    Subjective Functional Status/Changes: Patient reports that her left knee is bothering her more than her right hip today. TREATMENT AREA =  Pain in right hip [M25.551]    OBJECTIVE         Therapeutic Procedures: Tx Min Billable or 1:1 Min (if diff from Tx Min) Procedure, Rationale, Specifics   15  71592 Therapeutic Exercise (timed):  increase ROM, strength, coordination, balance, and proprioception to improve patient's ability to progress to PLOF and address remaining functional goals. (see flow sheet as applicable)     Details if applicable:       9  51086 Neuromuscular Re-Education (timed):  improve balance, coordination, kinesthetic sense, posture, core stability and proprioception to improve patient's ability to develop conscious control of individual muscles and awareness of position of extremities in order to progress to PLOF and address remaining functional goals. (see flow sheet as applicable)     Details if applicable:     15  54810 Therapeutic Activity (timed):  use of dynamic activities replicating functional movements to increase ROM, strength, coordination, balance, and proprioception in order to improve patient's ability to progress to PLOF and address remaining functional goals.   (see flow sheet as applicable)     Details if applicable:            Details if applicable:            Details if applicable:     44  [facility] Totals Reminder: bill using total billable min of TIMED therapeutic procedures (example: do not include dry needle or estim unattended, both untimed codes, in totals to left)  8-22 min = 1 unit; 23-37 min = 2 units;

## 2023-08-18 ENCOUNTER — HOSPITAL ENCOUNTER (OUTPATIENT)
Facility: HOSPITAL | Age: 68
Setting detail: RECURRING SERIES
Discharge: HOME | End: 2023-08-21
Payer: MEDICARE

## 2023-08-18 PROCEDURE — 97112 NEUROMUSCULAR REEDUCATION: CPT

## 2023-08-18 PROCEDURE — 97530 THERAPEUTIC ACTIVITIES: CPT

## 2023-08-18 PROCEDURE — 97110 THERAPEUTIC EXERCISES: CPT

## 2023-08-18 NOTE — PROGRESS NOTES
PHYSICAL / OCCUPATIONAL THERAPY - DAILY TREATMENT NOTE (updated )    Patient Name: Denzel Sanchez    Date: 2023    : 1955  Insurance: Payor: MEDICARE / Plan: MEDICARE PART A AND B / Product Type: *No Product type* /      Patient  verified Yes     Visit #   Current / Total 13 24   Time   In / Out 1051 1151   Pain   In / Out 0 0   Subjective Functional Status/Changes: Patient complains no pain in Right Hip today      TREATMENT AREA =  Pain in right hip [M25.551]    OBJECTIVE    Modalities Rationale:     decrease inflammation and increase tissue extensibility to improve patient's ability to progress to PLOF and address remaining functional goals. min [] Estim Unattended, type/location:                                      []  w/ice    []  w/heat    min [] Estim Attended, type/location:                                     []  w/US     []  w/ice    []  w/heat    []  TENS insruct      min []  Mechanical Traction: type/lbs                   []  pro   []  sup   []  int   []  cont    []  before manual    []  after manual    min []  Ultrasound, settings/location:     10 min  unbill [x]  Ice     []  Heat    location/position: R Hip area and supine    min []  Paraffin,  details:     min []  Vasopneumatic Device, press/temp:     min []  Dulce Tejada / Zoila Nazario: If using vaso (only need to measure limb vaso being performed on)      pre-treatment girth :       post-treatment girth :       measured at (landmark location) :      min []  Other:    Skin assessment post-treatment:  Intact      Therapeutic Procedures: Tx Min Billable or 1:1 Min (if diff from Tx Min) Procedure, Rationale, Specifics   5 20 28609 Therapeutic Exercise (timed):  increase ROM, strength, coordination, balance, and proprioception to improve patient's ability to progress to PLOF and address remaining functional goals.  (see flow sheet as applicable)     Details if applicable:        10 72563 Therapeutic Activity (timed):  use of

## 2023-08-22 ENCOUNTER — OFFICE VISIT (OUTPATIENT)
Age: 68
End: 2023-08-22

## 2023-08-22 VITALS — HEIGHT: 65 IN | BODY MASS INDEX: 29.12 KG/M2 | WEIGHT: 174.8 LBS

## 2023-08-22 DIAGNOSIS — Z96.641 PRESENCE OF RIGHT ARTIFICIAL HIP JOINT: Primary | ICD-10-CM

## 2023-08-22 DIAGNOSIS — M70.61 TROCHANTERIC BURSITIS OF RIGHT HIP: ICD-10-CM

## 2023-08-22 PROCEDURE — 99024 POSTOP FOLLOW-UP VISIT: CPT | Performed by: PHYSICIAN ASSISTANT

## 2023-08-23 ENCOUNTER — HOSPITAL ENCOUNTER (OUTPATIENT)
Facility: HOSPITAL | Age: 68
Setting detail: RECURRING SERIES
Discharge: HOME | End: 2023-08-26
Payer: MEDICARE

## 2023-08-23 PROCEDURE — 97530 THERAPEUTIC ACTIVITIES: CPT

## 2023-08-23 PROCEDURE — 97112 NEUROMUSCULAR REEDUCATION: CPT

## 2023-08-23 PROCEDURE — 97110 THERAPEUTIC EXERCISES: CPT

## 2023-08-23 NOTE — PROGRESS NOTES
PHYSICAL / OCCUPATIONAL THERAPY - DAILY TREATMENT NOTE (updated )    Patient Name: Chris Mayo    Date: 2023    : 1955  Insurance: Payor: MEDICARE / Plan: MEDICARE PART A AND B / Product Type: *No Product type* /      Patient  verified Yes     Visit #   Current / Total 14 24   Time   In / Out 930   1018   Pain   In / Out 0 0   Subjective Functional Status/Changes: I am trying to use the cane less at home     TREATMENT AREA =  Pain in right hip [M25.551]    OBJECTIVE         Therapeutic Procedures: Tx Min Billable or 1:1 Min (if diff from Tx Min) Procedure, Rationale, Specifics   25  19135 Therapeutic Exercise (timed):  increase ROM, strength, coordination, balance, and proprioception to improve patient's ability to progress to PLOF and address remaining functional goals. (see flow sheet as applicable)     Details if applicable:       10 10 10528 Neuromuscular Re-Education (timed):  improve balance, coordination, kinesthetic sense, posture, core stability and proprioception to improve patient's ability to develop conscious control of individual muscles and awareness of position of extremities in order to progress to PLOF and address remaining functional goals. (see flow sheet as applicable)     Details if applicable:     13 13 36369 Therapeutic Activity (timed):  use of dynamic activities replicating functional movements to increase ROM, strength, coordination, balance, and proprioception in order to improve patient's ability to progress to PLOF and address remaining functional goals.   (see flow sheet as applicable)     Details if applicable:            Details if applicable:            Details if applicable:     50 48 [address] Reminder: bill using total billable min of TIMED therapeutic procedures (example: do not include dry needle or estim unattended, both untimed codes, in totals to left)  8-22 min = 1 unit; 23-37 min = 2 units; 38-52 min = 3 units; 53-67 min = 4 units; 68-82 min

## 2023-08-25 ENCOUNTER — HOSPITAL ENCOUNTER (OUTPATIENT)
Facility: HOSPITAL | Age: 68
Setting detail: RECURRING SERIES
Discharge: HOME | End: 2023-08-28
Payer: MEDICARE

## 2023-08-25 PROCEDURE — 97112 NEUROMUSCULAR REEDUCATION: CPT

## 2023-08-25 PROCEDURE — 97110 THERAPEUTIC EXERCISES: CPT

## 2023-08-25 PROCEDURE — 97530 THERAPEUTIC ACTIVITIES: CPT

## 2023-08-25 NOTE — PROGRESS NOTES
PN: 43    Current:Ongoing, will assess at next PN. 8/25/2023      3. Patient will demonstrate right hip flex MMT >/= 4+/5 to perform ADL/IADLs with greater ease   PN: 4      Current:Ongoing, will assess at next PN. 8/23/2023      4. Patient will ambulate >/= 1,000 ft, on even/uneven surfaces, w/ no AD/independenlty with no increase in ss/sx in order to demonstrate a return to PLOF   PN: 200ft with SC left  slow valdez    Current:  Patient able to tolerate 6' on TM with no increase in right hip pain.  8/23/2023        Next PN/ RC due    recert 2/8/39  Auth due DANISHA  PLAN  Yes  Continue plan of care  [x]  Upgrade activities as tolerated  []  Discharge due to :  []  Other:    Zofia Chinchilla, PT    8/25/2023    10:35 AM    Future Appointments   Date Time Provider [address]   8/30/2023  9:30 AM Bonnita Pain, PT [facility]   8/31/2023 10:10 AM Bonnita Pain, PT [facility]   9/1/2023 10:50 AM Cassia Gutierrez, PTA [facility]   10/10/2023 10:40 AM Lisa Newton MD [facility] BS AMB   12/1/2023 10:30 AM Manuel Lombardi PA-C [facility] BS AMB

## 2023-08-30 ENCOUNTER — HOSPITAL ENCOUNTER (OUTPATIENT)
Facility: HOSPITAL | Age: 68
Setting detail: RECURRING SERIES
Discharge: HOME | End: 2023-09-02
Payer: MEDICARE

## 2023-08-30 PROCEDURE — 97110 THERAPEUTIC EXERCISES: CPT

## 2023-08-30 PROCEDURE — 97112 NEUROMUSCULAR REEDUCATION: CPT

## 2023-08-30 PROCEDURE — 97116 GAIT TRAINING THERAPY: CPT

## 2023-08-30 NOTE — PROGRESS NOTES
PHYSICAL / OCCUPATIONAL THERAPY - DAILY TREATMENT NOTE (updated )    Patient Name: Gin Argueta    Date: 2023    : 1955  Insurance: Payor: MEDICARE / Plan: MEDICARE PART A AND B / Product Type: *No Product type* /      Patient  verified Yes     Visit #   Current / Total 16 24   Time   In / Out 935 1025   Pain   In / Out 1 0   Subjective Functional Status/Changes: Patient complains pain in Right Hip today      TREATMENT AREA =  Pain in right hip [M25.551]    OBJECTIVE         Therapeutic Procedures: Tx Min Billable or 1:1 Min (if diff from Tx Min) Procedure, Rationale, Specifics   20  80065 Therapeutic Exercise (timed):  increase ROM, strength, coordination, balance, and proprioception to improve patient's ability to progress to PLOF and address remaining functional goals. (see flow sheet as applicable)     Details if applicable:       15  31355 Neuromuscular Re-Education (timed):  improve balance, coordination, kinesthetic sense, posture, core stability and proprioception to improve patient's ability to develop conscious control of individual muscles and awareness of position of extremities in order to progress to PLOF and address remaining functional goals. (see flow sheet as applicable)     Details if applicable:     15  15434 Gait Training (timed):    90ft  feet with no AD (assistive device) over even  surfaces with Supervision  level of assist. Cuing for improving  posture . To improve safety and dynamic movement with household/community ambulation.   (see flow sheet as applicable) patient performs gait of 90ft x 4 with Supervision with slow valdez with no LOB  and stair climbing     Details if applicable:            Details if applicable:            Details if applicable:     48  [facility] Totals Reminder: bill using total billable min of TIMED therapeutic procedures (example: do not include dry needle or estim unattended, both untimed codes, in totals to left)  8-22 min = 1 unit; 23-37

## 2023-08-31 ENCOUNTER — APPOINTMENT (OUTPATIENT)
Facility: HOSPITAL | Age: 68
End: 2023-08-31
Payer: MEDICARE

## 2023-09-01 ENCOUNTER — HOSPITAL ENCOUNTER (OUTPATIENT)
Facility: HOSPITAL | Age: 68
End: 2023-09-01
Payer: MEDICARE

## 2023-09-01 ENCOUNTER — HOSPITAL ENCOUNTER (OUTPATIENT)
Facility: HOSPITAL | Age: 68
Setting detail: RECURRING SERIES
Discharge: HOME | End: 2023-09-04
Payer: MEDICARE

## 2023-09-01 DIAGNOSIS — M06.9 RHEUMATOID ARTHRITIS, INVOLVING UNSPECIFIED SITE, UNSPECIFIED WHETHER RHEUMATOID FACTOR PRESENT (HCC): ICD-10-CM

## 2023-09-01 PROCEDURE — 71046 X-RAY EXAM CHEST 2 VIEWS: CPT

## 2023-09-01 PROCEDURE — 97110 THERAPEUTIC EXERCISES: CPT

## 2023-09-01 PROCEDURE — 97530 THERAPEUTIC ACTIVITIES: CPT

## 2023-09-01 NOTE — PROGRESS NOTES
PT DISCHARGE DAILY NOTE AND SUMMARY     Date:2023    Patient Name: Emmett Desouza : 1955   Medical   Diagnosis: Pain in right hip [M25.551] Treatment Diagnosis: M25.551  RIGHT HIP PAIN     Onset Date: Post Op: 23       Referral Source: Ashley Gibson PA-C Start of Care [facility]): 7/3/2023   Prior Hospitalization: See medical history Provider #: [id number]   Prior Level of Function: Ind/pain free performing ADL/IADLs, self care tasks, recreational participation (enjoys walking) and  ambulating/standing prolonged durations   Comorbidities: Per Medical Record: RA, HTN, Hx of Ankle Fx (2020), Osteoporosis, Glaucoma, Carpel Tunnel Release (left - , right - ), Cholecystectomy ()       Insurance: Payor: Mily Schwarz / Plan: MEDICARE PART A AND B / Product Type: *No Product type* /      Visits from Start of Care: 17  Missed Visits: 3    Reporting Period : 2023 to 2023    Patient  verified yes     Visit #   Current / Total 17  24    Time   In / Out 1050 am  1128    Pain   In / Out 0/10  0/10    Subjective Functional Status/Changes: Patient reports feeling pretty good today. TREATMENT AREA =  Pain in right hip [M25.551]    OBJECTIVE         Therapeutic Procedures: Tx Min Billable or 1:1 Min (if diff from Tx Min) Procedure, Rationale, Specifics   8  36611 Therapeutic Exercise (timed):  increase ROM, strength, coordination, balance, and proprioception to improve patient's ability to progress to PLOF and address remaining functional goals. (see flow sheet as applicable)     Details if applicable:       30  79041 Therapeutic Activity (timed):  use of dynamic activities replicating functional movements to increase ROM, strength, coordination, balance, and proprioception in order to improve patient's ability to progress to PLOF and address remaining functional goals.   (see flow sheet as applicable)     Details if applicable:            Details if applicable:            Details if

## 2023-09-18 ENCOUNTER — HOSPITAL ENCOUNTER (OUTPATIENT)
Facility: HOSPITAL | Age: 68
Setting detail: RECURRING SERIES
Discharge: HOME | End: 2023-09-21
Payer: MEDICARE

## 2023-09-18 PROCEDURE — 97161 PT EVAL LOW COMPLEX 20 MIN: CPT

## 2023-09-18 PROCEDURE — 97535 SELF CARE MNGMENT TRAINING: CPT

## 2023-09-18 PROCEDURE — 97110 THERAPEUTIC EXERCISES: CPT

## 2023-09-18 NOTE — PROGRESS NOTES
PHYSICAL / OCCUPATIONAL THERAPY - DAILY TREATMENT NOTE (updated )    Patient Name: Bob Norman    Date: 2023    : 1955  Insurance: Payor: MEDICARE / Plan: MEDICARE PART A AND B / Product Type: *No Product type* /      Patient  verified Yes     Visit #   Current / Total 1 8   Time   In / Out 1030 1110   Pain   In / Out 0 0   Subjective Functional Status/Changes: The pt reports weakness in her leg. Changes to: Allergies, Med Hx, Sx Hx?   no       TREATMENT AREA =  Pain in right hip [M25.551]    OBJECTIVE       min [] Estim Unattended, type/location:                                      []  w/ice    []  w/heat    min [] Estim Attended, type/location:                                     []  w/US     []  w/ice    []  w/heat    []  TENS insruct      min []  Mechanical Traction: type/lbs                   []  pro   []  sup   []  int   []  cont    []  before manual    []  after manual    min []  Ultrasound, settings/location:      min []  Iontophoresis w/ dexamethasone, location:                                               []  take home patch       []  in clinic        min  unbilled []  Ice     []  Heat    location/position:     min []  Paraffin,  details:     min []  Vasopneumatic Device, press/temp:     min []  Tono Mena / Brad Blowers: If using vaso (only need to measure limb vaso being performed on)      pre-treatment girth :       post-treatment girth :       measured at (landmark location) :      min []  Other:    Skin assessment post-treatment (if applicable):    []  intact    []  redness- no adverse reaction                 []redness - adverse reaction:         Therapeutic Procedures: Tx Min Billable or 1:1 Min (if diff from Tx Min) Procedure, Rationale, Specifics   15  45155 Therapeutic Exercise (timed):  increase ROM, strength, coordination, balance, and proprioception to improve patient's ability to progress to PLOF and address remaining functional goals.  (see flow sheet as
flexibility/joint mobility   Treatment Plan may include any combination of the followin Therapeutic Exercise, 01691 Self Care/Home Management, and 36167 Aquatic Therapy    Patient / Family readiness to learn indicated by: asking questions, trying to perform skills, and interest  Persons(s) to be included in education: patient (P)  Barriers to Learning/Limitations: none  Measures taken if barriers to learning present: n/a  Patient Goal (s): \"To get stronger\"  Patient Self Reported Health Status: good  Rehabilitation Potential: good    Short Term Goals: To be accomplished in 3 weeks  The pt will be I and compliant with HEP  IE- issued HEP   2. The pt will demonstrate 4+/5 right hip flexion MMT to improve ability for functional tasks   IE- 4/5  Long Term Goals: To be accomplished in 12 weeks  Improve FOTO score to 60 for improved tolerance to functional tasks  IE- 47  2. The pt will report a little difficulty with performing her usual work/housework   IE- moderate  3. Improve right glute med MMT to 4/5 for improved gait. IE- 3+/5  4. The pt will be issued aquatic program for [employer] transition at discharge. IE- not yet issued    Frequency / Duration: Patient to be seen 2 times per week for 12 weeks  Goals will be assigned and reassessed every 10 visits/ 30 days per guidelines . Patient/ Caregiver education and instruction: Diagnosis, prognosis, self care, activity modification, and exercises [x]  Plan of care has been reviewed with PTA    Certification Period: 23 to 12-15-23    Omari Woodruff, PT       2023       0:25 PM    I certify that the above Therapy Services are being furnished while the patient is under my care. I agree with the treatment plan and certify that this therapy is necessary.     Physician's Signature:_________________________   DATE:_________   TIME:________                           Yue Charles PA-C  Insurance: Payor: MEDICARE / Plan: MEDICARE PART A AND B / Product Type:

## 2023-09-19 ENCOUNTER — HOSPITAL ENCOUNTER (OUTPATIENT)
Facility: HOSPITAL | Age: 68
Setting detail: RECURRING SERIES
Discharge: HOME | End: 2023-09-22
Payer: MEDICARE

## 2023-09-19 PROCEDURE — 97113 AQUATIC THERAPY/EXERCISES: CPT

## 2023-09-19 NOTE — PROGRESS NOTES
PHYSICAL / OCCUPATIONAL THERAPY - DAILY TREATMENT NOTE (updated )    Patient Name: Shawn Thao    Date: 2023    : 1955  Insurance: Payor: MEDICARE / Plan: MEDICARE PART A AND B / Product Type: *No Product type* /      Patient  verified Yes     Visit #   Current / Total 2 8   Time   In / Out 3:10 3:51   Pain   In / Out 0 0   Subjective Functional Status/Changes: Pt reports no new complaints of pain. Pt reports compliance with HEP. Changes to: Allergies, Med Hx, Sx Hx?   no       TREATMENT AREA =  Pain in right hip [M25.551]    OBJECTIVE    Therapeutic Procedures: Tx Min Billable or 1:1 Min (if diff from Tx Min) Procedure, Rationale, Specifics   41  [id number] Aquatic Therapy (timed):  decrease pain, improve strength, flexibility, and range of motion using the buoyancy, thermal properties and consistent resistance of the water to improve patient's ability to progress to PLOF and address remaining functional goals. (see flow sheet as applicable)    Details if applicable:       39  [facility] Totals Reminder: bill using total billable min of TIMED therapeutic procedures (example: do not include dry needle or estim unattended, both untimed codes, in totals to left)  8-22 min = 1 unit; 23-37 min = 2 units; 38-52 min = 3 units; 53-67 min = 4 units; 68-82 min = 5 units   Total Total     TOTAL TREATMENT TIME:        41     [x]  Patient Education billed concurrently with other procedures   [x] Review HEP    [] Progressed/Changed HEP, detail:    [] Other detail:       Objective Information/Functional Measures/Assessment    Initiated Aquatic based exercises.      Patient will continue to benefit from skilled PT / OT services to modify and progress therapeutic interventions, analyze and address functional mobility deficits, analyze and address ROM deficits, analyze and address strength deficits, analyze and address soft tissue restrictions, analyze and cue for proper movement patterns, and analyze and modify

## 2023-09-28 ENCOUNTER — TELEPHONE (OUTPATIENT)
Facility: HOSPITAL | Age: 68
End: 2023-09-28

## 2023-09-28 ENCOUNTER — APPOINTMENT (OUTPATIENT)
Facility: HOSPITAL | Age: 68
End: 2023-09-28
Payer: MEDICARE

## 2023-10-03 ENCOUNTER — HOSPITAL ENCOUNTER (OUTPATIENT)
Facility: HOSPITAL | Age: 68
Setting detail: RECURRING SERIES
Discharge: HOME | End: 2023-10-06
Payer: MEDICARE

## 2023-10-03 PROCEDURE — 97113 AQUATIC THERAPY/EXERCISES: CPT

## 2023-10-03 NOTE — PROGRESS NOTES
interventions, analyze and address functional mobility deficits, analyze and address ROM deficits, analyze and address strength deficits, analyze and address soft tissue restrictions, analyze and cue for proper movement patterns, analyze and modify for postural abnormalities, analyze and address imbalance/dizziness, and instruct in home and community integration to address functional deficits and attain remaining goals. Progress toward goals / Updated goals:  []  See Progress Note/Recertification    Short Term Goals: To be accomplished in 3 weeks  The pt will be I and compliant with HEP  IE- issued HEP   Current: Met per patient report. 09/19/2023  2. The pt will demonstrate 4+/5 right hip flexion MMT to improve ability for functional tasks              IE- 4/5  Long Term Goals: To be accomplished in 12 weeks  Improve FOTO score to 60 for improved tolerance to functional tasks  IE- 47  2. The pt will report a little difficulty with performing her usual work/housework              IE- moderate  3. Improve right glute med MMT to 4/5 for improved gait. IE- 3+/5  4. The pt will be issued aquatic program for [employer] transition at discharge.                IE- not yet issued    PLAN  Yes  Continue plan of care  []  Upgrade activities as tolerated  []  Discharge due to :  []  Other:    Saniya Mcfadden, PT    10/3/2023    11:24 AM    Future Appointments   Date Time Provider [address]   10/5/2023  5:50 PM [facility] [place]   10/10/2023 10:40 AM Luke Randolph MD [facility] BS [facility]   10/10/2023  3:10 PM [facility] [place]   10/12/2023 11:10 AM [facility] [place]   10/17/2023 11:10 AM Saniya Mcfadden, PT [facility] [place]   10/19/2023 11:10 AM Saniya Mcfadden, PT [facility] [place]   12/1/2023 10:30 AM Ricarda Medeiros PA-C [facility] BS AMB

## 2023-10-05 ENCOUNTER — HOSPITAL ENCOUNTER (OUTPATIENT)
Facility: HOSPITAL | Age: 68
Setting detail: RECURRING SERIES
Discharge: HOME | End: 2023-10-08
Payer: MEDICARE

## 2023-10-05 PROCEDURE — 97113 AQUATIC THERAPY/EXERCISES: CPT

## 2023-10-05 NOTE — PROGRESS NOTES
and analyze and cue for proper movement patterns to address functional deficits and attain remaining goals. Progress toward goals / Updated goals:  []  See Progress Note/Recertification    Short Term Goals: To be accomplished in 3 weeks  The pt will be I and compliant with HEP  IE- issued HEP   Current: Met per patient report. 09/19/2023  2. The pt will demonstrate 4+/5 right hip flexion MMT to improve ability for functional tasks              IE- 4/5  Long Term Goals: To be accomplished in 12 weeks  Improve FOTO score to 60 for improved tolerance to functional tasks  IE- 47  2. The pt will report a little difficulty with performing her usual work/housework              IE- moderate  3. Improve right glute med MMT to 4/5 for improved gait. IE- 3+/5  4. The pt will be issued aquatic program for [employer] transition at discharge.                IE- not yet issued    PLAN  Yes  Continue plan of care  []  Upgrade activities as tolerated  []  Discharge due to :  []  Other:    [facility], PTA    10/5/2023    5:48 PM    Future Appointments   Date Time Provider [address] [place] [address]   10/5/2023  5:50 PM [facility], PTA [facility] [place]   10/10/2023 10:40 AM Giana Cash MD [facility]   10/10/2023  3:10 PM [facility], PTA [facility] [place]   10/12/2023 11:10 AM Godfrey Vaughan, PT [facility]   10/17/2023 11:10 AM Godfrey Vaughan, PT [facility] [place]   10/19/2023 11:10 AM Godfrey Vaughan, PT [facility] [place]   12/1/2023 10:30 AM Liana Parker PA-C [facility] BS AMB

## 2023-10-10 ENCOUNTER — HOSPITAL ENCOUNTER (OUTPATIENT)
Facility: HOSPITAL | Age: 68
Setting detail: RECURRING SERIES
Discharge: HOME | End: 2023-10-13
Payer: MEDICARE

## 2023-10-10 ENCOUNTER — OFFICE VISIT (OUTPATIENT)
Age: 68
End: 2023-10-10
Payer: MEDICARE

## 2023-10-10 VITALS
WEIGHT: 178 LBS | HEIGHT: 65 IN | HEART RATE: 82 BPM | DIASTOLIC BLOOD PRESSURE: 65 MMHG | SYSTOLIC BLOOD PRESSURE: 113 MMHG | BODY MASS INDEX: 29.66 KG/M2 | RESPIRATION RATE: 18 BRPM | TEMPERATURE: 97.2 F | OXYGEN SATURATION: 100 %

## 2023-10-10 DIAGNOSIS — I10 ESSENTIAL (PRIMARY) HYPERTENSION: ICD-10-CM

## 2023-10-10 DIAGNOSIS — E78.5 DYSLIPIDEMIA: ICD-10-CM

## 2023-10-10 DIAGNOSIS — Z00.00 MEDICARE ANNUAL WELLNESS VISIT, SUBSEQUENT: Primary | ICD-10-CM

## 2023-10-10 PROCEDURE — G8427 DOCREV CUR MEDS BY ELIG CLIN: HCPCS | Performed by: INTERNAL MEDICINE

## 2023-10-10 PROCEDURE — 1123F ACP DISCUSS/DSCN MKR DOCD: CPT | Performed by: INTERNAL MEDICINE

## 2023-10-10 PROCEDURE — 1090F PRES/ABSN URINE INCON ASSESS: CPT | Performed by: INTERNAL MEDICINE

## 2023-10-10 PROCEDURE — 97113 AQUATIC THERAPY/EXERCISES: CPT

## 2023-10-10 PROCEDURE — 1036F TOBACCO NON-USER: CPT | Performed by: INTERNAL MEDICINE

## 2023-10-10 PROCEDURE — 3078F DIAST BP <80 MM HG: CPT | Performed by: INTERNAL MEDICINE

## 2023-10-10 PROCEDURE — 3074F SYST BP LT 130 MM HG: CPT | Performed by: INTERNAL MEDICINE

## 2023-10-10 PROCEDURE — G8399 PT W/DXA RESULTS DOCUMENT: HCPCS | Performed by: INTERNAL MEDICINE

## 2023-10-10 PROCEDURE — G8417 CALC BMI ABV UP PARAM F/U: HCPCS | Performed by: INTERNAL MEDICINE

## 2023-10-10 PROCEDURE — 3017F COLORECTAL CA SCREEN DOC REV: CPT | Performed by: INTERNAL MEDICINE

## 2023-10-10 PROCEDURE — G8484 FLU IMMUNIZE NO ADMIN: HCPCS | Performed by: INTERNAL MEDICINE

## 2023-10-10 PROCEDURE — G0439 PPPS, SUBSEQ VISIT: HCPCS | Performed by: INTERNAL MEDICINE

## 2023-10-10 PROCEDURE — 99497 ADVNCD CARE PLAN 30 MIN: CPT | Performed by: INTERNAL MEDICINE

## 2023-10-10 PROCEDURE — 99214 OFFICE O/P EST MOD 30 MIN: CPT | Performed by: INTERNAL MEDICINE

## 2023-10-10 RX ORDER — LISINOPRIL AND HYDROCHLOROTHIAZIDE 20; 12.5 MG/1; MG/1
1 TABLET ORAL DAILY
Qty: 90 TABLET | Refills: 3 | Status: SHIPPED | OUTPATIENT
Start: 2023-10-10

## 2023-10-10 RX ORDER — AMLODIPINE BESYLATE 5 MG/1
5 TABLET ORAL DAILY
Qty: 90 TABLET | Refills: 3 | Status: SHIPPED | OUTPATIENT
Start: 2023-10-10

## 2023-10-10 SDOH — ECONOMIC STABILITY: FOOD INSECURITY: WITHIN THE PAST 12 MONTHS, YOU WORRIED THAT YOUR FOOD WOULD RUN OUT BEFORE YOU GOT MONEY TO BUY MORE.: NEVER TRUE

## 2023-10-10 SDOH — ECONOMIC STABILITY: INCOME INSECURITY: HOW HARD IS IT FOR YOU TO PAY FOR THE VERY BASICS LIKE FOOD, HOUSING, MEDICAL CARE, AND HEATING?: NOT HARD AT ALL

## 2023-10-10 SDOH — ECONOMIC STABILITY: HOUSING INSECURITY
IN THE LAST 12 MONTHS, WAS THERE A TIME WHEN YOU DID NOT HAVE A STEADY PLACE TO SLEEP OR SLEPT IN A SHELTER (INCLUDING NOW)?: NO

## 2023-10-10 SDOH — ECONOMIC STABILITY: FOOD INSECURITY: WITHIN THE PAST 12 MONTHS, THE FOOD YOU BOUGHT JUST DIDN'T LAST AND YOU DIDN'T HAVE MONEY TO GET MORE.: NEVER TRUE

## 2023-10-10 ASSESSMENT — ENCOUNTER SYMPTOMS
BLOOD IN STOOL: 0
SHORTNESS OF BREATH: 0

## 2023-10-10 ASSESSMENT — PATIENT HEALTH QUESTIONNAIRE - PHQ9
SUM OF ALL RESPONSES TO PHQ QUESTIONS 1-9: 0
2. FEELING DOWN, DEPRESSED OR HOPELESS: 0
SUM OF ALL RESPONSES TO PHQ QUESTIONS 1-9: 0
SUM OF ALL RESPONSES TO PHQ9 QUESTIONS 1 & 2: 0
1. LITTLE INTEREST OR PLEASURE IN DOING THINGS: 0

## 2023-10-10 ASSESSMENT — LIFESTYLE VARIABLES
HOW OFTEN DO YOU HAVE A DRINK CONTAINING ALCOHOL: NEVER
HOW MANY STANDARD DRINKS CONTAINING ALCOHOL DO YOU HAVE ON A TYPICAL DAY: PATIENT DOES NOT DRINK

## 2023-10-10 NOTE — PROGRESS NOTES
PHYSICAL / OCCUPATIONAL THERAPY - DAILY TREATMENT NOTE (updated )    Patient Name: Bob Norman    Date: 10/10/2023    : 1955  Insurance: Payor: MEDICARE / Plan: MEDICARE PART A AND B / Product Type: *No Product type* /      Patient  verified Yes     Visit #   Current / Total 5 8   Time   In / Out 2:58 3:42   Pain   In / Out 0 0   Subjective Functional Status/Changes: Pt reports no current c/o pain. Pt reports compliance with HEP. Changes to: Allergies, Med Hx, Sx Hx?   no       TREATMENT AREA =  Pain in right hip [M25.551]    OBJECTIVE  Therapeutic Procedures: Tx Min Billable or 1:1 Min (if diff from Tx Min) Procedure, Rationale, Specifics   44  [id number] Aquatic Therapy (timed):  decrease pain, improve strength, flexibility, and range of motion using the buoyancy, thermal properties and consistent resistance of the water to improve patient's ability to progress to PLOF and address remaining functional goals. (see flow sheet as applicable)    Details if applicable:       40  [facility] Totals Reminder: bill using total billable min of TIMED therapeutic procedures (example: do not include dry needle or estim unattended, both untimed codes, in totals to left)  8-22 min = 1 unit; 23-37 min = 2 units; 38-52 min = 3 units; 53-67 min = 4 units; 68-82 min = 5 units   Total Total     TOTAL TREATMENT TIME:        44     [x]  Patient Education billed concurrently with other procedures   [x] Review HEP    [] Progressed/Changed HEP, detail:    [] Other detail:       Objective Information/Functional Measures/Assessment    Added hip circles; Pt has good response to added exercises with decreased stiffness reported.      Patient will continue to benefit from skilled PT / OT services to modify and progress therapeutic interventions, analyze and address functional mobility deficits, analyze and address ROM deficits, analyze and address strength deficits, analyze and address soft tissue restrictions, analyze and cue

## 2023-10-10 NOTE — PROGRESS NOTES
Mumtaz Palencia presents today for   Chief Complaint   Patient presents with    Medicare AWV                 1. \"Have you been to the ER, urgent care clinic since your last visit? Hospitalized since your last visit? \" no    2. \"Have you seen or consulted any other health care providers outside of the [address] [place] [address] since your last visit? \" no     3. For patients aged 43-73: Has the patient had a colonoscopy / FIT/ Cologuard? Yes - no Care Gap present      If the patient is female:    4. For patients aged 43-66: Has the patient had a mammogram within the past 2 years? Yes - no Care Gap present      5. For patients aged 21-65: Has the patient had a pap smear?  NA - based on age or sex

## 2023-10-10 NOTE — PROGRESS NOTES
BERTO Ferro is here for Medicare wellness/ACP visit. She is pleased with her hip replacement, and is staying active with treadmill and aquatic exercises. Is any known new medical family history. She wonders if she can stop any of her blood pressure medications. We reviewed that a normal blood pressure is considered about 120/80, and that her hyperlipidemia and her rheumatoid arthritis both contribute to her increased cardiovascular risk. I recommend that she continue them at this time, and she voices understanding. Past Medical History:   Diagnosis Date    Ankle fracture 09/2020    no cast or sx    Arthritis     Rheumatoid arthritis    Ganglion 10/2019    Glaucoma     Hypertension     Osteoporosis     Rheumatoid arthritis ([address])     Dr. Deandra Shelton. Cimzia infusions monthly        Past Surgical History:   Procedure Laterality Date    CARPAL TUNNEL RELEASE Left 2017    CARPAL TUNNEL RELEASE Right 2016    CHOLECYSTECTOMY  1993    TOTAL HIP ARTHROPLASTY Right 6/5/2023    RIGHT TOTAL HIP ARTHROPLASTY LATERAL performed by Marie Charles MD at [facility] MAIN OR        Current Outpatient Medications   Medication Sig Dispense Refill    amLODIPine (NORVASC) 5 MG tablet Take 1 tablet by mouth daily 90 tablet 3    lisinopril-hydroCHLOROthiazide (PRINZIDE;ZESTORETIC) 20-12.5 MG per tablet Take 1 tablet by mouth daily 90 tablet 3    Cholecalciferol (VITAMIN D3) 50 MCG (2000 UT) CAPS Take by mouth daily      Multiple Vitamins-Minerals (THERAPEUTIC MULTIVITAMIN-MINERALS) tablet Take 1 tablet by mouth daily      Teriparatide, Recombinant, (FORTEO SC) Inject into the skin daily      Abatacept (ORENCIA CLICKJECT) 030 MG/ML SOAJ weekly      dorzolamide-timolol (COSOPT) 22.3-6.8 MG/ML ophthalmic solution 1 drop into affected eye      latanoprost (XALATAN) 0.005 % ophthalmic solution Apply 1 drop to eye       No current facility-administered medications for this visit.         Allergies   Allergen Reactions    Motrin

## 2023-10-10 NOTE — PATIENT INSTRUCTIONS
[place] on Aging online. You need 3111-5281 mg of calcium and 8414-3111 IU of vitamin D per day. It is possible to meet your calcium requirement with diet alone, but a vitamin D supplement is usually necessary to meet this goal.  When exposed to the sun, use a sunscreen that protects against both UVA and UVB radiation with an SPF of 30 or greater. Reapply every 2 to 3 hours or after sweating, drying off with a towel, or swimming. Always wear a seat belt when traveling in a car. Always wear a helmet when riding a bicycle or motorcycle.

## 2023-10-11 NOTE — ACP (ADVANCE CARE PLANNING)
Advance Care Planning     Advance Care Planning (ACP) Physician/NP/PA Conversation    Date of Conversation: 10/10/2023  Conducted with: Patient with Decision Making Capacity    Healthcare Decision Maker:      Primary Decision Maker: Gina Izqiuerdo - [phone]    Secondary Decision Maker: Yen Wilhelm - [phone]    Click here to complete Healthcare Decision Makers including selection of the Healthcare Decision Maker Relationship (ie \"Primary\")      Care Preferences:    Hospitalization: \"If your health worsens and it becomes clear that your chance of recovery is unlikely, what would be your preference regarding hospitalization? \"  The patient would prefer hospitalization. Ventilation: \"If you were unable to breath on your own and your chance of recovery was unlikely, what would be your preference about the use of a ventilator (breathing machine) if it was available to you? \"  The patient would desire the use of a ventilator. Resuscitation: \"In the event your heart stopped as a result of an underlying serious health condition, would you want attempts made to restart your heart, or would you prefer a natural death? \"  Yes, attempt to resuscitate.     artificial nutrition No feeding tubes    Conversation Outcomes / Follow-Up Plan:  ACP complete - no further action today  Reviewed DNR/DNI and patient elects Full Code (Attempt Resuscitation)    Length of Voluntary ACP Conversation in minutes:  16 minutes    Roman Gardiner MD

## 2023-10-12 ENCOUNTER — HOSPITAL ENCOUNTER (OUTPATIENT)
Facility: HOSPITAL | Age: 68
Setting detail: RECURRING SERIES
Discharge: HOME | End: 2023-10-15
Payer: MEDICARE

## 2023-10-12 PROCEDURE — 97113 AQUATIC THERAPY/EXERCISES: CPT

## 2023-10-12 NOTE — PROGRESS NOTES
PHYSICAL / OCCUPATIONAL THERAPY - DAILY TREATMENT NOTE (updated )    Patient Name: Emmett Desouza    Date: 10/12/2023    : 1955  Insurance: Payor: MEDICARE / Plan: MEDICARE PART A AND B / Product Type: *No Product type* /      Patient  verified Yes     Visit #   Current / Total 6 8   Time   In / Out 11:12 11:51   Pain   In / Out 0 0   Subjective Functional Status/Changes: Pt states she is not using her cane at all around her home and is trying to use it less in general but still needs it to walk long distances. Changes to: Allergies, Med Hx, Sx Hx?   no       TREATMENT AREA =  Pain in right hip [M25.551]    OBJECTIVE      Therapeutic Procedures: Tx Min Billable or 1:1 Min (if diff from Tx Min) Procedure, Rationale, Specifics   39  [id number] Aquatic Therapy (timed):  decrease pain, improve strength, flexibility, and range of motion using the buoyancy, thermal properties and consistent resistance of the water to improve patient's ability to progress to PLOF and address remaining functional goals. (see flow sheet as applicable)    Details if applicable:                           44  [facility] Totals Reminder: bill using total billable min of TIMED therapeutic procedures (example: do not include dry needle or estim unattended, both untimed codes, in totals to left)  8-22 min = 1 unit; 23-37 min = 2 units; 38-52 min = 3 units; 53-67 min = 4 units; 68-82 min = 5 units   Total Total     TOTAL TREATMENT TIME:        39     [x]  Patient Education billed concurrently with other procedures   [x] Review HEP    [] Progressed/Changed HEP, detail:    [] Other detail:       Objective Information/Functional Measures/Assessment    Increased TM walking speed as noted on exercise flow sheet and added bilateral UE arm swing. Pt demonstrates good technique and good equal weight distribution between right and left LE.      Patient will continue to benefit from skilled PT / OT services to modify and progress therapeutic

## 2023-10-16 ENCOUNTER — TELEPHONE (OUTPATIENT)
Facility: HOSPITAL | Age: 68
End: 2023-10-16

## 2023-10-17 ENCOUNTER — APPOINTMENT (OUTPATIENT)
Facility: HOSPITAL | Age: 68
End: 2023-10-17
Payer: MEDICARE

## 2023-10-19 ENCOUNTER — HOSPITAL ENCOUNTER (OUTPATIENT)
Facility: HOSPITAL | Age: 68
Setting detail: RECURRING SERIES
Discharge: HOME | End: 2023-10-22
Payer: MEDICARE

## 2023-10-19 PROCEDURE — 97113 AQUATIC THERAPY/EXERCISES: CPT

## 2023-10-19 NOTE — PROGRESS NOTES
[address] [facility] [address] [facility] - Ph: [phone]   Fx: [phone]    PHYSICAL THERAPY PROGRESS NOTE      Patient name: Shawn Thao Start of Care: 2023   Referral source: Kurt Ledesma : 1955               Medical Diagnosis: Pain in right hip [M25.551]        Onset PYJD:9-4-10               Treatment Diagnosis: M25.551  RIGHT HIP PAIN                                      Prior Hospitalization: see medical history Provider#: [id number]   Medications: Verified on Patient Summary List      Comorbidities: RA, right MIKE 23, HTN, osteoporosis  Prior Level of Function: functionally I with daily activities, gait with cane leading up to surgery      Visits from Start of Care: 7    Missed Visits: 1    Goals/Measure of Progress: To be achieved in 12 weeks:    Short Term Goals: To be accomplished in 3 weeks  The pt will be I and compliant with HEP  IE- issued HEP   Current: Met per patient report. 2023  2. The pt will demonstrate 4+/5 right hip flexion MMT to improve ability for functional tasks              IE- 4/5              Current: Met; 4+/5 (10/19/23)  Long Term Goals: To be accomplished in 12 weeks  Improve FOTO score to 60 for improved tolerance to functional tasks  IE- 47   Current: Progressing; 59. (10/19/23)  2. The pt will report a little difficulty with performing her usual work/housework              IE- moderate              Current: No change; pt reports moderate difficulty performing usual housework. (10/19/23)  3. Improve right glute med MMT to 4/5 for improved gait. IE- 3+/5              Current: No change (10/19/23)  4. The pt will be issued aquatic program for [employer] transition at discharge.                IE- not yet issued    Summary of Care/ Key Functional Changes: Pt states that she feels 70% overall improvement since beginning PT and states she can sweep/mop now and that her
[place] [employer]   11/14/2023 11:10 AM [facility] [place]   11/16/2023 11:10 AM [facility] [place]   12/1/2023 10:30 AM Ricarda Medeiros PA-C [facility] AMB   4/9/2024 10:00 AM IO LAB VISIT [facility] BS AMB   4/16/2024 11:00 AM Kimmy Cortez MD [facility] BS AMB

## 2023-10-24 ENCOUNTER — HOSPITAL ENCOUNTER (OUTPATIENT)
Facility: HOSPITAL | Age: 68
Setting detail: RECURRING SERIES
Discharge: HOME | End: 2023-10-27
Payer: MEDICARE

## 2023-10-24 PROCEDURE — 97113 AQUATIC THERAPY/EXERCISES: CPT

## 2023-10-24 NOTE — PROGRESS NOTES
PHYSICAL / OCCUPATIONAL THERAPY - DAILY TREATMENT NOTE (updated )    Patient Name: Shawn Thao    Date: 10/24/2023    : 1955  Insurance: Payor: MEDICARE / Plan: MEDICARE PART A AND B / Product Type: *No Product type* /      Patient  verified Yes     Visit #   Current / Total 8 24   Time   In / Out 12:28 1:06   Pain   In / Out 0 0   Subjective Functional Status/Changes: Pt states she still \"walks like a duck\" but is trying to work on that and be more aware of how she is walking. Changes to: Allergies, Med Hx, Sx Hx?   no       TREATMENT AREA =  Pain in right hip [M25.551]    OBJECTIVE        Therapeutic Procedures: Tx Min Billable or 1:1 Min (if diff from Tx Min) Procedure, Rationale, Specifics   38  [id number] Aquatic Therapy (timed):  decrease pain, improve strength, flexibility, and range of motion using the buoyancy, thermal properties and consistent resistance of the water to improve patient's ability to progress to PLOF and address remaining functional goals. (see flow sheet as applicable)    Details if applicable:                           45  [facility] Totals Reminder: bill using total billable min of TIMED therapeutic procedures (example: do not include dry needle or estim unattended, both untimed codes, in totals to left)  8-22 min = 1 unit; 23-37 min = 2 units; 38-52 min = 3 units; 53-67 min = 4 units; 68-82 min = 5 units   Total Total     TOTAL TREATMENT TIME:        38     [x]  Patient Education billed concurrently with other procedures   [x] Review HEP    [] Progressed/Changed HEP, detail:    [] Other detail:       Objective Information/Functional Measures/Assessment    Increased LE resistance to 3 lb ankle weights bilaterally for all exercises in pool.      Patient will continue to benefit from skilled PT / OT services to modify and progress therapeutic interventions, analyze and address functional mobility deficits, analyze and address ROM deficits, analyze and address strength
Yes

## 2023-10-26 ENCOUNTER — HOSPITAL ENCOUNTER (OUTPATIENT)
Facility: HOSPITAL | Age: 68
Setting detail: RECURRING SERIES
Discharge: HOME | End: 2023-10-29
Payer: MEDICARE

## 2023-10-26 PROCEDURE — 97113 AQUATIC THERAPY/EXERCISES: CPT

## 2023-10-26 NOTE — PROGRESS NOTES
PHYSICAL / OCCUPATIONAL THERAPY - DAILY TREATMENT NOTE (updated )    Patient Name: Ky Freeman    Date: 10/26/2023    : 1955  Insurance: Payor: MEDICARE / Plan: MEDICARE PART A AND B / Product Type: *No Product type* /      Patient  verified Yes     Visit #   Current / Total 9 24   Time   In / Out 4:30 5:10   Pain   In / Out 0 0   Subjective Functional Status/Changes: Pt reports no new complaints of pain. Pt reports she still cannot sleep on her right side. Changes to: Allergies, Med Hx, Sx Hx?   no       TREATMENT AREA =  Pain in right hip [M25.551]    OBJECTIVE    Therapeutic Procedures: Tx Min Billable or 1:1 Min (if diff from Tx Min) Procedure, Rationale, Specifics   40  [id number] Aquatic Therapy (timed):  decrease pain, improve strength, flexibility, and range of motion using the buoyancy, thermal properties and consistent resistance of the water to improve patient's ability to progress to PLOF and address remaining functional goals. (see flow sheet as applicable)    Details if applicable:       36  [facility] Totals Reminder: bill using total billable min of TIMED therapeutic procedures (example: do not include dry needle or estim unattended, both untimed codes, in totals to left)  8-22 min = 1 unit; 23-37 min = 2 units; 38-52 min = 3 units; 53-67 min = 4 units; 68-82 min = 5 units   Total Total     TOTAL TREATMENT TIME:        40     [x]  Patient Education billed concurrently with other procedures   [x] Review HEP    [] Progressed/Changed HEP, detail:    [] Other detail:       Objective Information/Functional Measures/Assessment    Pt demonstrates good form and control with all aquatic based exercises.      Patient will continue to benefit from skilled PT / OT services to modify and progress therapeutic interventions, analyze and address functional mobility deficits, analyze and address ROM deficits, analyze and address strength deficits, analyze and address soft tissue restrictions, analyze

## 2023-10-31 ENCOUNTER — APPOINTMENT (OUTPATIENT)
Facility: HOSPITAL | Age: 68
End: 2023-10-31
Payer: MEDICARE

## 2023-11-07 ENCOUNTER — HOSPITAL ENCOUNTER (OUTPATIENT)
Facility: HOSPITAL | Age: 68
Discharge: HOME | End: 2023-11-10
Attending: INTERNAL MEDICINE
Payer: MEDICARE

## 2023-11-07 VITALS — HEIGHT: 64 IN | BODY MASS INDEX: 30.05 KG/M2 | WEIGHT: 176 LBS

## 2023-11-07 DIAGNOSIS — Z12.31 VISIT FOR SCREENING MAMMOGRAM: ICD-10-CM

## 2023-11-07 PROCEDURE — 77063 BREAST TOMOSYNTHESIS BI: CPT

## 2023-11-09 ENCOUNTER — HOSPITAL ENCOUNTER (OUTPATIENT)
Facility: HOSPITAL | Age: 68
Setting detail: RECURRING SERIES
Discharge: HOME | End: 2023-11-12
Payer: MEDICARE

## 2023-11-09 PROCEDURE — 97113 AQUATIC THERAPY/EXERCISES: CPT

## 2023-11-09 NOTE — PROGRESS NOTES
PHYSICAL / OCCUPATIONAL THERAPY - DAILY TREATMENT NOTE (updated )    Patient Name: Shawn Thao    Date: 2023    : 1955  Insurance: Payor: MEDICARE / Plan: MEDICARE PART A AND B / Product Type: *No Product type* /      Patient  verified Yes     Visit #   Current / Total 10 24   Time   In / Out 11:15 11:54   Pain   In / Out 0 0   Subjective Functional Status/Changes: Pt voiced no complaints in right hip but states \"my left side is feeling it more today\", as she has stated her left hip and knee has had to compensate for a long time. Changes to: Allergies, Med Hx, Sx Hx?   no       TREATMENT AREA =  Pain in right hip [M25.551]    OBJECTIVE        Therapeutic Procedures: Tx Min Billable or 1:1 Min (if diff from Tx Min) Procedure, Rationale, Specifics   39  [id number] Aquatic Therapy (timed):  decrease pain, improve strength, flexibility, and range of motion using the buoyancy, thermal properties and consistent resistance of the water to improve patient's ability to progress to PLOF and address remaining functional goals. (see flow sheet as applicable)    Details if applicable:                           44  [facility] Totals Reminder: bill using total billable min of TIMED therapeutic procedures (example: do not include dry needle or estim unattended, both untimed codes, in totals to left)  8-22 min = 1 unit; 23-37 min = 2 units; 38-52 min = 3 units; 53-67 min = 4 units; 68-82 min = 5 units   Total Total     TOTAL TREATMENT TIME:        39     [x]  Patient Education billed concurrently with other procedures   [x] Review HEP    [] Progressed/Changed HEP, detail:    [] Other detail:       Objective Information/Functional Measures/Assessment    No changes made in exercises today. Pt ambulating without AD  and with improved valdez and decreased gait deviation.      Patient will continue to benefit from skilled PT / OT services to modify and progress therapeutic interventions, analyze and address functional

## 2023-11-14 ENCOUNTER — HOSPITAL ENCOUNTER (OUTPATIENT)
Facility: HOSPITAL | Age: 68
Setting detail: RECURRING SERIES
Discharge: HOME | End: 2023-11-17
Payer: MEDICARE

## 2023-11-14 PROCEDURE — 97113 AQUATIC THERAPY/EXERCISES: CPT

## 2023-11-14 NOTE — PROGRESS NOTES
PHYSICAL / OCCUPATIONAL THERAPY - DAILY TREATMENT NOTE (updated )    Patient Name: Yu Cortes    Date: 2023    : 1955  Insurance: Payor: MEDICARE / Plan: MEDICARE PART A AND B / Product Type: *No Product type* /      Patient  verified Yes     Visit #   Current / Total 11 24   Time   In / Out 11:18 11:56   Pain   In / Out 0 0   Subjective Functional Status/Changes: Pt states that her right hip is doing very well. She c/o intermittent pain in her left knee and states she will discuss this with Dr. Rebeca Javier when she has her next f/u visit. Changes to: Allergies, Med Hx, Sx Hx?   no       TREATMENT AREA =  Pain in right hip [M25.551]    OBJECTIVE      Therapeutic Procedures: Tx Min Billable or 1:1 Min (if diff from Tx Min) Procedure, Rationale, Specifics   38  [id number] Aquatic Therapy (timed):  decrease pain, improve strength, flexibility, and range of motion using the buoyancy, thermal properties and consistent resistance of the water to improve patient's ability to progress to PLOF and address remaining functional goals. (see flow sheet as applicable)    Details if applicable:                           45  [facility] Totals Reminder: bill using total billable min of TIMED therapeutic procedures (example: do not include dry needle or estim unattended, both untimed codes, in totals to left)  8-22 min = 1 unit; 23-37 min = 2 units; 38-52 min = 3 units; 53-67 min = 4 units; 68-82 min = 5 units   Total Total     TOTAL TREATMENT TIME:        38     [x]  Patient Education billed concurrently with other procedures   [x] Review HEP    [] Progressed/Changed HEP, detail:    [] Other detail:       Objective Information/Functional Measures/Assessment    Pt performs all exercises with good postural awareness and good form.      Patient will continue to benefit from skilled PT / OT services to modify and progress therapeutic interventions, analyze and address functional mobility deficits, analyze and address ROM

## 2023-11-16 ENCOUNTER — HOSPITAL ENCOUNTER (OUTPATIENT)
Facility: HOSPITAL | Age: 68
Setting detail: RECURRING SERIES
Discharge: HOME | End: 2023-11-19
Payer: MEDICARE

## 2023-11-16 PROCEDURE — 97113 AQUATIC THERAPY/EXERCISES: CPT

## 2023-11-16 NOTE — PROGRESS NOTES
PHYSICAL / OCCUPATIONAL THERAPY - DAILY TREATMENT NOTE (updated )    Patient Name: Janessa Johnson    Date: 2023    : 1955  Insurance: Payor: MEDICARE / Plan: MEDICARE PART A AND B / Product Type: *No Product type* /      Patient  verified Yes     Visit #   Current / Total 12 24   Time   In / Out 11:10 11:50   Pain   In / Out 0 0   Subjective Functional Status/Changes: Pt voiced no complaints today. Pt is a [employer] member and plans to continue with aquatic exercises on her own at the [facility]. Changes to: Allergies, Med Hx, Sx Hx?   no       TREATMENT AREA =  Pain in right hip [M25.551]    OBJECTIVE        Therapeutic Procedures: Tx Min Billable or 1:1 Min (if diff from Tx Min) Procedure, Rationale, Specifics   40  [id number] Aquatic Therapy (timed):  decrease pain, improve strength, flexibility, and range of motion using the buoyancy, thermal properties and consistent resistance of the water to improve patient's ability to progress to PLOF and address remaining functional goals. (see flow sheet as applicable)    Details if applicable:                           36  [facility] Totals Reminder: bill using total billable min of TIMED therapeutic procedures (example: do not include dry needle or estim unattended, both untimed codes, in totals to left)  8-22 min = 1 unit; 23-37 min = 2 units; 38-52 min = 3 units; 53-67 min = 4 units; 68-82 min = 5 units   Total Total     TOTAL TREATMENT TIME:        40     [x]  Patient Education billed concurrently with other procedures   [x] Review HEP    [] Progressed/Changed HEP, detail:    [] Other detail:       Objective Information/Functional Measures/Assessment    Aquatic HEP issued to pt. MMT: right glute med /5. FOTO: 61      Progress toward goals / Updated goals:  []  See Progress Note/Recertification    Short Term Goals: To be accomplished in 3 weeks  The pt will be I and compliant with HEP  IE- issued HEP   Current: Met per patient report. 2023  2.  The pt

## 2023-11-16 NOTE — PROGRESS NOTES
[facility]  [address]  [phone] fax    Physical Therapy Discharge Summary    Patient name: Nidia Velazco Start of Care: 2023   Referral source: Trav Mg : 1955               Medical Diagnosis: Pain in right hip [M25.551]        Onset AAZD:34               Treatment Diagnosis: M25.551  RIGHT HIP PAIN                                      Prior Hospitalization: see medical history Provider#: [id number]   Medications: Verified on Patient Summary List      Comorbidities: RA, right MIKE 23, HTN, osteoporosis  Prior Level of Function: functionally I with daily activities, gait with cane leading up to surgery      Visits from Start of Care: 12    Missed Visits: 3    Reporting Period : 23 to 23    Goals/Measure of Progress:  Short Term Goals: To be accomplished in 3 weeks  The pt will be I and compliant with HEP  IE- issued HEP   Current: Met per patient report. 2023  2. The pt will demonstrate 4+/5 right hip flexion MMT to improve ability for functional tasks              IE- 4/5              Current: Met; 4+/5 (10/19/23)  Long Term Goals: To be accomplished in 12 weeks  Improve FOTO score to 60 for improved tolerance to functional tasks  IE- 47   Current: Met; 63. (23)   2. The pt will report a little difficulty with performing her usual work/housework              IE- moderate              Current: Met; reports having a little bit of difficulty. (23)   3. Improve right glute med MMT to 4/5 for improved gait. IE- 3+/5              Current: Met; 4/5. (23)   4. The pt will be issued aquatic program for [employer] transition at discharge. IE- not yet issued              Current: Met; aquatic HEP issued to pt.  (23)     Assessment/ Summary of Care: Pt progressed very well with PT and is member of the [facility] and plans to transition to her HEP at

## 2023-12-01 ENCOUNTER — OFFICE VISIT (OUTPATIENT)
Age: 68
End: 2023-12-01
Payer: MEDICARE

## 2023-12-01 VITALS — WEIGHT: 176 LBS | BODY MASS INDEX: 30.05 KG/M2 | HEIGHT: 64 IN

## 2023-12-01 DIAGNOSIS — M47.816 LUMBAR SPONDYLOSIS: ICD-10-CM

## 2023-12-01 DIAGNOSIS — Z96.641 PRESENCE OF RIGHT ARTIFICIAL HIP JOINT: Primary | ICD-10-CM

## 2023-12-01 DIAGNOSIS — M16.12 PRIMARY OSTEOARTHRITIS OF LEFT HIP: ICD-10-CM

## 2023-12-01 DIAGNOSIS — R26.89 LIMP: ICD-10-CM

## 2023-12-01 DIAGNOSIS — M87.052 AVASCULAR NECROSIS OF BONE OF LEFT HIP (HCC): ICD-10-CM

## 2023-12-01 DIAGNOSIS — M81.0 AGE-RELATED OSTEOPOROSIS WITHOUT CURRENT PATHOLOGICAL FRACTURE: ICD-10-CM

## 2023-12-01 DIAGNOSIS — M17.12 PRIMARY OSTEOARTHRITIS OF LEFT KNEE: ICD-10-CM

## 2023-12-01 PROCEDURE — G8417 CALC BMI ABV UP PARAM F/U: HCPCS | Performed by: PHYSICIAN ASSISTANT

## 2023-12-01 PROCEDURE — 3017F COLORECTAL CA SCREEN DOC REV: CPT | Performed by: PHYSICIAN ASSISTANT

## 2023-12-01 PROCEDURE — G8427 DOCREV CUR MEDS BY ELIG CLIN: HCPCS | Performed by: PHYSICIAN ASSISTANT

## 2023-12-01 PROCEDURE — G8399 PT W/DXA RESULTS DOCUMENT: HCPCS | Performed by: PHYSICIAN ASSISTANT

## 2023-12-01 PROCEDURE — 72170 X-RAY EXAM OF PELVIS: CPT | Performed by: PHYSICIAN ASSISTANT

## 2023-12-01 PROCEDURE — 1123F ACP DISCUSS/DSCN MKR DOCD: CPT | Performed by: PHYSICIAN ASSISTANT

## 2023-12-01 PROCEDURE — G8484 FLU IMMUNIZE NO ADMIN: HCPCS | Performed by: PHYSICIAN ASSISTANT

## 2023-12-01 PROCEDURE — 99214 OFFICE O/P EST MOD 30 MIN: CPT | Performed by: PHYSICIAN ASSISTANT

## 2023-12-01 PROCEDURE — 1036F TOBACCO NON-USER: CPT | Performed by: PHYSICIAN ASSISTANT

## 2023-12-01 PROCEDURE — 1090F PRES/ABSN URINE INCON ASSESS: CPT | Performed by: PHYSICIAN ASSISTANT

## 2023-12-01 NOTE — PROGRESS NOTES
discussed the progression of AVN and would like to get an x-ray in 3 months time or sooner if necessary. She is instructed on gait training. She will continue with home excise program.  We will see her back at the 3-month [facility] for reevaluation and repeat x-ray. Care plan outlined and precautions discussed. Radiographs and Results were reviewed with the patient. Medications were reviewed with the patient. All of pt's questions and concerns were addressed. Increasing symptoms and return precautions associated with chief complaint and evaluation were reviewed with the patient in detail. The patient demonstrated adequate understanding.               JR Teddy COSTA, PAMauroC, ATC

## 2024-01-03 ENCOUNTER — TELEPHONE (OUTPATIENT)
Age: 69
End: 2024-01-03

## 2024-01-03 RX ORDER — AMOXICILLIN 500 MG/1
CAPSULE ORAL
Qty: 4 CAPSULE | Refills: 3 | Status: SHIPPED | OUTPATIENT
Start: 2024-01-03

## 2024-01-03 NOTE — TELEPHONE ENCOUNTER
Patient called and said that she needs some Antibiotic medication from [facility], for when she goes to the Dentist.     Patient said she had sx done by  on her hip back on 6/5/2023.    Patient said she has not scheduled the dentist appt as yet. She will schedule the appt once she has the Antibiotic medication.     [facility] [address]  Tel. [phone]    Patient would like a call once the medication is sent to the pharmacy. That she would also like to know the name of the Antibiotic that will be prescribed.     Patient  tel. [phone].

## 2024-03-01 ENCOUNTER — OFFICE VISIT (OUTPATIENT)
Age: 69
End: 2024-03-01
Payer: MEDICARE

## 2024-03-01 VITALS — BODY MASS INDEX: 28.85 KG/M2 | WEIGHT: 169 LBS | HEIGHT: 64 IN

## 2024-03-01 DIAGNOSIS — Z96.641 PRESENCE OF RIGHT ARTIFICIAL HIP JOINT: ICD-10-CM

## 2024-03-01 DIAGNOSIS — M87.052 AVASCULAR NECROSIS OF BONE OF LEFT HIP (HCC): ICD-10-CM

## 2024-03-01 DIAGNOSIS — M25.552 PAIN OF LEFT HIP: Primary | ICD-10-CM

## 2024-03-01 DIAGNOSIS — M16.12 PRIMARY OSTEOARTHRITIS OF LEFT HIP: ICD-10-CM

## 2024-03-01 PROCEDURE — 3017F COLORECTAL CA SCREEN DOC REV: CPT | Performed by: PHYSICIAN ASSISTANT

## 2024-03-01 PROCEDURE — 72170 X-RAY EXAM OF PELVIS: CPT | Performed by: PHYSICIAN ASSISTANT

## 2024-03-01 PROCEDURE — 1090F PRES/ABSN URINE INCON ASSESS: CPT | Performed by: PHYSICIAN ASSISTANT

## 2024-03-01 PROCEDURE — 1036F TOBACCO NON-USER: CPT | Performed by: PHYSICIAN ASSISTANT

## 2024-03-01 PROCEDURE — 1123F ACP DISCUSS/DSCN MKR DOCD: CPT | Performed by: PHYSICIAN ASSISTANT

## 2024-03-01 PROCEDURE — G8417 CALC BMI ABV UP PARAM F/U: HCPCS | Performed by: PHYSICIAN ASSISTANT

## 2024-03-01 PROCEDURE — G8484 FLU IMMUNIZE NO ADMIN: HCPCS | Performed by: PHYSICIAN ASSISTANT

## 2024-03-01 PROCEDURE — G8399 PT W/DXA RESULTS DOCUMENT: HCPCS | Performed by: PHYSICIAN ASSISTANT

## 2024-03-01 PROCEDURE — G8427 DOCREV CUR MEDS BY ELIG CLIN: HCPCS | Performed by: PHYSICIAN ASSISTANT

## 2024-03-01 PROCEDURE — 99214 OFFICE O/P EST MOD 30 MIN: CPT | Performed by: PHYSICIAN ASSISTANT

## 2024-03-01 NOTE — PROGRESS NOTES
Patient: Yesy Peng                MRN: [id number]       SSN: [id number]  YOB: 1955        AGE: 69 y.o.        SEX: female  Body mass index is 29.01 kg/m².    PCP: Anne-Marie Cortez MD  03/01/24      This office note has been dictated.      REVIEW OF SYSTEMS:  Constitutional: Negative for fever, chills, weight loss and malaise/fatigue.   HENT: Negative.    Eyes: Negative.    Respiratory: Negative.   Cardiovascular: Negative.   Gastrointestinal: No bowel incontinence or constipation.  Genitourinary: No bladder incontinence or saddle anesthesia.  Skin: Negative.   Neurological: Negative.    Endo/Heme/Allergies: Negative.    Psychiatric/Behavioral: Negative.  Musculoskeletal: As per HPI above.     Past Medical History:   Diagnosis Date    Ankle fracture 09/2020    no cast or sx    Arthritis     Rheumatoid arthritis    Ganglion 10/2019    Glaucoma     Hypertension     Osteoporosis     Rheumatoid arthritis (HCC)     Dr. asencio.  Cimzia infusions monthly         Current Outpatient Medications:     amoxicillin (AMOXIL) 500 MG capsule, 4 po one hour prior to appt, Disp: 4 capsule, Rfl: 3    amLODIPine (NORVASC) 5 MG tablet, Take 1 tablet by mouth daily, Disp: 90 tablet, Rfl: 3    lisinopril-hydroCHLOROthiazide (PRINZIDE;ZESTORETIC) 20-12.5 MG per tablet, Take 1 tablet by mouth daily, Disp: 90 tablet, Rfl: 3    Cholecalciferol (VITAMIN D3) 50 MCG (2000 UT) CAPS, Take by mouth daily, Disp: , Rfl:     Multiple Vitamins-Minerals (THERAPEUTIC MULTIVITAMIN-MINERALS) tablet, Take 1 tablet by mouth daily, Disp: , Rfl:     Teriparatide, Recombinant, (FORTEO SC), Inject into the skin daily, Disp: , Rfl:     Abatacept (ORENCIA CLICKJECT) 125 MG/ML SOAJ, weekly, Disp: , Rfl:     dorzolamide-timolol (COSOPT) 22.3-6.8 MG/ML ophthalmic solution, 1 drop into affected eye, Disp: , Rfl:     latanoprost (XALATAN) 0.005 % ophthalmic solution, Apply 1 drop to eye, Disp: , Rfl:     Allergies   Allergen Reactions

## 2024-04-09 ENCOUNTER — HOSPITAL ENCOUNTER (OUTPATIENT)
Facility: HOSPITAL | Age: 69
Setting detail: SPECIMEN
Discharge: HOME | End: 2024-04-12
Payer: MEDICARE

## 2024-04-09 DIAGNOSIS — E78.5 DYSLIPIDEMIA: ICD-10-CM

## 2024-04-09 LAB
CHOLEST SERPL-MCNC: 245 MG/DL
HDLC SERPL-MCNC: 46 MG/DL (ref 40–60)
HDLC SERPL: 5.3 (ref 0–5)
LDLC SERPL CALC-MCNC: 175.8 MG/DL (ref 0–100)
LIPID PANEL: ABNORMAL
TRIGL SERPL-MCNC: 116 MG/DL
VLDLC SERPL CALC-MCNC: 23.2 MG/DL

## 2024-04-09 PROCEDURE — 80061 LIPID PANEL: CPT

## 2024-04-09 PROCEDURE — 36415 COLL VENOUS BLD VENIPUNCTURE: CPT

## 2024-04-16 ENCOUNTER — OFFICE VISIT (OUTPATIENT)
Age: 69
End: 2024-04-16
Payer: MEDICARE

## 2024-04-16 VITALS
TEMPERATURE: 98.2 F | HEART RATE: 85 BPM | HEIGHT: 64 IN | RESPIRATION RATE: 18 BRPM | OXYGEN SATURATION: 100 % | SYSTOLIC BLOOD PRESSURE: 126 MMHG | BODY MASS INDEX: 28.68 KG/M2 | WEIGHT: 168 LBS | DIASTOLIC BLOOD PRESSURE: 70 MMHG

## 2024-04-16 DIAGNOSIS — M05.79 RHEUMATOID ARTHRITIS OF MULTIPLE SITES WITHOUT ORGAN OR SYSTEM INVOLVEMENT WITH POSITIVE RHEUMATOID FACTOR (HCC): ICD-10-CM

## 2024-04-16 DIAGNOSIS — I10 ESSENTIAL (PRIMARY) HYPERTENSION: Primary | ICD-10-CM

## 2024-04-16 DIAGNOSIS — E78.5 DYSLIPIDEMIA: ICD-10-CM

## 2024-04-16 PROCEDURE — 1036F TOBACCO NON-USER: CPT | Performed by: INTERNAL MEDICINE

## 2024-04-16 PROCEDURE — G8427 DOCREV CUR MEDS BY ELIG CLIN: HCPCS | Performed by: INTERNAL MEDICINE

## 2024-04-16 PROCEDURE — 1123F ACP DISCUSS/DSCN MKR DOCD: CPT | Performed by: INTERNAL MEDICINE

## 2024-04-16 PROCEDURE — 3017F COLORECTAL CA SCREEN DOC REV: CPT | Performed by: INTERNAL MEDICINE

## 2024-04-16 PROCEDURE — 99214 OFFICE O/P EST MOD 30 MIN: CPT | Performed by: INTERNAL MEDICINE

## 2024-04-16 PROCEDURE — G8417 CALC BMI ABV UP PARAM F/U: HCPCS | Performed by: INTERNAL MEDICINE

## 2024-04-16 PROCEDURE — G8399 PT W/DXA RESULTS DOCUMENT: HCPCS | Performed by: INTERNAL MEDICINE

## 2024-04-16 PROCEDURE — 3074F SYST BP LT 130 MM HG: CPT | Performed by: INTERNAL MEDICINE

## 2024-04-16 PROCEDURE — 1090F PRES/ABSN URINE INCON ASSESS: CPT | Performed by: INTERNAL MEDICINE

## 2024-04-16 PROCEDURE — 3078F DIAST BP <80 MM HG: CPT | Performed by: INTERNAL MEDICINE

## 2024-04-16 RX ORDER — LISINOPRIL AND HYDROCHLOROTHIAZIDE 20; 12.5 MG/1; MG/1
1 TABLET ORAL DAILY
Qty: 90 TABLET | Refills: 3 | Status: SHIPPED | OUTPATIENT
Start: 2024-04-16

## 2024-04-16 RX ORDER — AMLODIPINE BESYLATE 5 MG/1
5 TABLET ORAL DAILY
Qty: 90 TABLET | Refills: 3 | Status: SHIPPED | OUTPATIENT
Start: 2024-04-16

## 2024-04-16 ASSESSMENT — ENCOUNTER SYMPTOMS
BLOOD IN STOOL: 0
SHORTNESS OF BREATH: 0

## 2024-04-16 ASSESSMENT — PATIENT HEALTH QUESTIONNAIRE - PHQ9
2. FEELING DOWN, DEPRESSED OR HOPELESS: NOT AT ALL
SUM OF ALL RESPONSES TO PHQ9 QUESTIONS 1 & 2: 0
SUM OF ALL RESPONSES TO PHQ QUESTIONS 1-9: 0
1. LITTLE INTEREST OR PLEASURE IN DOING THINGS: NOT AT ALL
SUM OF ALL RESPONSES TO PHQ QUESTIONS 1-9: 0

## 2024-04-17 NOTE — PROGRESS NOTES
Yesy Peng presents today for BP              1. \"Have you been to the ER, urgent care clinic since your last visit?  Hospitalized since your last visit?\" no    2. \"Have you seen or consulted any other health care providers outside of the [facility] since your last visit?\" no     3. For patients aged 45-75: Has the patient had a colonoscopy / FIT/ Cologuard? Yes - no Care Gap present      If the patient is female:    4. For patients aged 40-74: Has the patient had a mammogram within the past 2 years? Yes - no Care Gap present      5. For patients aged 21-65: Has the patient had a pap smear? NA - based on age or sex  
multiple sites without organ or system involvement with positive rheumatoid factor (HCC)  Comments:  per DR. Leal.  hip arthritis is from osteoarthritis, procedure planned, will have pre-op eval closer to date.         No follow-up provider specified.         Anne-Marie Cortez MD

## 2024-06-07 ENCOUNTER — OFFICE VISIT (OUTPATIENT)
Age: 69
End: 2024-06-07
Payer: MEDICARE

## 2024-06-07 VITALS — HEIGHT: 64 IN | BODY MASS INDEX: 28.34 KG/M2 | WEIGHT: 166 LBS

## 2024-06-07 DIAGNOSIS — M81.0 AGE-RELATED OSTEOPOROSIS WITHOUT CURRENT PATHOLOGICAL FRACTURE: ICD-10-CM

## 2024-06-07 DIAGNOSIS — M87.052 AVASCULAR NECROSIS OF BONE OF LEFT HIP (HCC): Primary | ICD-10-CM

## 2024-06-07 DIAGNOSIS — M16.12 PRIMARY OSTEOARTHRITIS OF LEFT HIP: ICD-10-CM

## 2024-06-07 DIAGNOSIS — Z96.641 PRESENCE OF RIGHT ARTIFICIAL HIP JOINT: ICD-10-CM

## 2024-06-07 PROCEDURE — 1123F ACP DISCUSS/DSCN MKR DOCD: CPT | Performed by: PHYSICIAN ASSISTANT

## 2024-06-07 PROCEDURE — 1036F TOBACCO NON-USER: CPT | Performed by: PHYSICIAN ASSISTANT

## 2024-06-07 PROCEDURE — G8427 DOCREV CUR MEDS BY ELIG CLIN: HCPCS | Performed by: PHYSICIAN ASSISTANT

## 2024-06-07 PROCEDURE — G8399 PT W/DXA RESULTS DOCUMENT: HCPCS | Performed by: PHYSICIAN ASSISTANT

## 2024-06-07 PROCEDURE — 1090F PRES/ABSN URINE INCON ASSESS: CPT | Performed by: PHYSICIAN ASSISTANT

## 2024-06-07 PROCEDURE — 3017F COLORECTAL CA SCREEN DOC REV: CPT | Performed by: PHYSICIAN ASSISTANT

## 2024-06-07 PROCEDURE — G8417 CALC BMI ABV UP PARAM F/U: HCPCS | Performed by: PHYSICIAN ASSISTANT

## 2024-06-07 PROCEDURE — 99213 OFFICE O/P EST LOW 20 MIN: CPT | Performed by: PHYSICIAN ASSISTANT

## 2024-06-07 NOTE — PROGRESS NOTES
Motrin [Ibuprofen] Anaphylaxis     Nose bleeds       Social History     Socioeconomic History    Marital status:      Spouse name: Not on file    Number of children: Not on file    Years of education: Not on file    Highest education level: Not on file   Occupational History    Not on file   Tobacco Use    Smoking status: Former     Current packs/day: 0.00     Average packs/day: 0.5 packs/day for 30.0 years (15.0 ttl pk-yrs)     Types: Cigarettes     Start date: 1973     Quit date: 2003     Years since quittin.4    Smokeless tobacco: Never   Substance and Sexual Activity    Alcohol use: Not Currently     Comment: quit     Drug use: Never    Sexual activity: Not Currently     Partners: Male   Other Topics Concern    Not on file   Social History Narrative    Not on file     Social Determinants of Health     Financial Resource Strain: Low Risk  (10/10/2023)    Overall Financial Resource Strain (CARDIA)     Difficulty of Paying Living Expenses: Not hard at all   Food Insecurity: Not on file (10/10/2023)   Transportation Needs: Unknown (10/10/2023)    PRAPARE - Transportation     Lack of Transportation (Medical): Not on file     Lack of Transportation (Non-Medical): No   Physical Activity: Sufficiently Active (10/10/2023)    Exercise Vital Sign     Days of Exercise per Week: 7 days     Minutes of Exercise per Session: 50 min   Stress: Not on file   Social Connections: Not on file   Intimate Partner Violence: Not on file   Housing Stability: Unknown (10/10/2023)    Housing Stability Vital Sign     Unable to Pay for Housing in the Last Year: Not on file     Number of Places Lived in the Last Year: Not on file     Unstable Housing in the Last Year: No       Past Surgical History:   Procedure Laterality Date    CARPAL TUNNEL RELEASE Left 2017    CARPAL TUNNEL RELEASE Right 2016    CHOLECYSTECTOMY  1993    TOTAL HIP ARTHROPLASTY Right 2023    RIGHT TOTAL HIP ARTHROPLASTY LATERAL performed by Josh

## 2024-07-08 ENCOUNTER — PREP FOR PROCEDURE (OUTPATIENT)
Age: 69
End: 2024-07-08

## 2024-07-08 DIAGNOSIS — Z01.818 PRE-OP TESTING: Primary | ICD-10-CM

## 2024-07-08 DIAGNOSIS — M16.12 PRIMARY OSTEOARTHRITIS OF LEFT HIP: ICD-10-CM

## 2024-07-22 ENCOUNTER — HOSPITAL ENCOUNTER (OUTPATIENT)
Facility: HOSPITAL | Age: 69
Discharge: HOME | End: 2024-07-25
Payer: MEDICARE

## 2024-07-22 ENCOUNTER — NURSE ONLY (OUTPATIENT)
Age: 69
End: 2024-07-22
Payer: MEDICARE

## 2024-07-22 DIAGNOSIS — Z01.818 PRE-OP TESTING: ICD-10-CM

## 2024-07-22 DIAGNOSIS — M25.552 PAIN OF LEFT HIP: ICD-10-CM

## 2024-07-22 DIAGNOSIS — M16.12 PRIMARY OSTEOARTHRITIS OF LEFT HIP: Primary | ICD-10-CM

## 2024-07-22 DIAGNOSIS — M16.12 PRIMARY OSTEOARTHRITIS OF LEFT HIP: ICD-10-CM

## 2024-07-22 DIAGNOSIS — M87.052 AVASCULAR NECROSIS OF BONE OF LEFT HIP (HCC): ICD-10-CM

## 2024-07-22 LAB
ALBUMIN SERPL-MCNC: 4 G/DL (ref 3.4–5)
AMORPH CRY URNS QL MICRO: ABNORMAL
ANION GAP SERPL CALC-SCNC: 7 MMOL/L (ref 3–18)
APPEARANCE UR: CLEAR
APTT PPP: 28.1 SEC (ref 23–36.4)
BASOPHILS # BLD: 0 K/UL (ref 0–0.1)
BASOPHILS NFR BLD: 0 % (ref 0–2)
BILIRUB UR QL: NEGATIVE
BUN SERPL-MCNC: 16 MG/DL (ref 7–18)
BUN/CREAT SERPL: 31 (ref 12–20)
CALCIUM SERPL-MCNC: 10.6 MG/DL (ref 8.5–10.1)
CHLORIDE SERPL-SCNC: 103 MMOL/L (ref 100–111)
CO2 SERPL-SCNC: 27 MMOL/L (ref 21–32)
COLOR UR: YELLOW
CREAT SERPL-MCNC: 0.51 MG/DL (ref 0.6–1.3)
DIFFERENTIAL METHOD BLD: ABNORMAL
EKG ATRIAL RATE: 69 BPM
EKG DIAGNOSIS: NORMAL
EKG P AXIS: 67 DEGREES
EKG P-R INTERVAL: 158 MS
EKG Q-T INTERVAL: 384 MS
EKG QRS DURATION: 70 MS
EKG QTC CALCULATION (BAZETT): 411 MS
EKG R AXIS: 51 DEGREES
EKG T AXIS: 42 DEGREES
EKG VENTRICULAR RATE: 69 BPM
EOSINOPHIL # BLD: 0.1 K/UL (ref 0–0.4)
EOSINOPHIL NFR BLD: 1 % (ref 0–5)
EPITH CASTS URNS QL MICRO: ABNORMAL /LPF (ref 0–5)
ERYTHROCYTE [DISTWIDTH] IN BLOOD BY AUTOMATED COUNT: 13.8 % (ref 11.6–14.5)
EST. AVERAGE GLUCOSE BLD GHB EST-MCNC: 100 MG/DL
GLUCOSE SERPL-MCNC: 109 MG/DL (ref 74–99)
GLUCOSE UR STRIP.AUTO-MCNC: NEGATIVE MG/DL
HBA1C MFR BLD: 5.1 % (ref 4.2–5.6)
HCT VFR BLD AUTO: 46.1 % (ref 35–45)
HGB BLD-MCNC: 14.5 G/DL (ref 12–16)
HGB UR QL STRIP: NEGATIVE
IMM GRANULOCYTES # BLD AUTO: 0 K/UL (ref 0–0.04)
IMM GRANULOCYTES NFR BLD AUTO: 0 % (ref 0–0.5)
INR PPP: 1 (ref 0.9–1.1)
KETONES UR QL STRIP.AUTO: NEGATIVE MG/DL
LEUKOCYTE ESTERASE UR QL STRIP.AUTO: NEGATIVE
LYMPHOCYTES # BLD: 3.6 K/UL (ref 0.9–3.6)
LYMPHOCYTES NFR BLD: 38 % (ref 21–52)
MCH RBC QN AUTO: 30.1 PG (ref 24–34)
MCHC RBC AUTO-ENTMCNC: 31.5 G/DL (ref 31–37)
MCV RBC AUTO: 95.8 FL (ref 78–100)
MONOCYTES # BLD: 0.7 K/UL (ref 0.05–1.2)
MONOCYTES NFR BLD: 7 % (ref 3–10)
NEUTS SEG # BLD: 5 K/UL (ref 1.8–8)
NEUTS SEG NFR BLD: 53 % (ref 40–73)
NITRITE UR QL STRIP.AUTO: NEGATIVE
NRBC # BLD: 0 K/UL (ref 0–0.01)
NRBC BLD-RTO: 0 PER 100 WBC
PH UR STRIP: 7.5 (ref 5–8)
PLATELET # BLD AUTO: 252 K/UL (ref 135–420)
PMV BLD AUTO: 12 FL (ref 9.2–11.8)
POTASSIUM SERPL-SCNC: 4.7 MMOL/L (ref 3.5–5.5)
PROT UR STRIP-MCNC: NEGATIVE MG/DL
PROTHROMBIN TIME: 12.9 SEC (ref 11.9–14.9)
RBC # BLD AUTO: 4.81 M/UL (ref 4.2–5.3)
RBC #/AREA URNS HPF: ABNORMAL /HPF (ref 0–5)
SODIUM SERPL-SCNC: 137 MMOL/L (ref 136–145)
SP GR UR REFRACTOMETRY: 1.01 (ref 1–1.03)
UROBILINOGEN UR QL STRIP.AUTO: 0.2 EU/DL (ref 0.2–1)
WBC # BLD AUTO: 9.5 K/UL (ref 4.6–13.2)
WBC URNS QL MICRO: ABNORMAL /HPF (ref 0–4)

## 2024-07-22 PROCEDURE — 71046 X-RAY EXAM CHEST 2 VIEWS: CPT

## 2024-07-22 PROCEDURE — 80048 BASIC METABOLIC PNL TOTAL CA: CPT

## 2024-07-22 PROCEDURE — 81001 URINALYSIS AUTO W/SCOPE: CPT

## 2024-07-22 PROCEDURE — 36415 COLL VENOUS BLD VENIPUNCTURE: CPT

## 2024-07-22 PROCEDURE — 83036 HEMOGLOBIN GLYCOSYLATED A1C: CPT

## 2024-07-22 PROCEDURE — 85025 COMPLETE CBC W/AUTO DIFF WBC: CPT

## 2024-07-22 PROCEDURE — 85730 THROMBOPLASTIN TIME PARTIAL: CPT

## 2024-07-22 PROCEDURE — 73502 X-RAY EXAM HIP UNI 2-3 VIEWS: CPT | Performed by: ORTHOPAEDIC SURGERY

## 2024-07-22 PROCEDURE — 87086 URINE CULTURE/COLONY COUNT: CPT

## 2024-07-22 PROCEDURE — 93005 ELECTROCARDIOGRAM TRACING: CPT

## 2024-07-22 PROCEDURE — 82040 ASSAY OF SERUM ALBUMIN: CPT

## 2024-07-22 PROCEDURE — 85610 PROTHROMBIN TIME: CPT

## 2024-07-22 PROCEDURE — 93010 ELECTROCARDIOGRAM REPORT: CPT | Performed by: INTERNAL MEDICINE

## 2024-07-23 LAB
BACTERIA SPEC CULT: NORMAL
SERVICE CMNT-IMP: NORMAL

## 2024-07-24 ENCOUNTER — OFFICE VISIT (OUTPATIENT)
Age: 69
End: 2024-07-24

## 2024-07-24 VITALS
DIASTOLIC BLOOD PRESSURE: 73 MMHG | HEIGHT: 64 IN | SYSTOLIC BLOOD PRESSURE: 132 MMHG | WEIGHT: 173 LBS | BODY MASS INDEX: 29.53 KG/M2

## 2024-07-24 DIAGNOSIS — M16.12 PRIMARY OSTEOARTHRITIS OF LEFT HIP: Primary | ICD-10-CM

## 2024-07-24 RX ORDER — DEXAMETHASONE SODIUM PHOSPHATE 4 MG/ML
8 INJECTION, SOLUTION INTRA-ARTICULAR; INTRALESIONAL; INTRAMUSCULAR; INTRAVENOUS; SOFT TISSUE
OUTPATIENT
Start: 2024-07-24 | End: 2024-07-24

## 2024-07-24 RX ORDER — PREGABALIN 25 MG/1
75 CAPSULE ORAL
OUTPATIENT
Start: 2024-07-24 | End: 2024-07-24

## 2024-07-24 RX ORDER — ACETAMINOPHEN 325 MG/1
1000 TABLET ORAL
OUTPATIENT
Start: 2024-07-24 | End: 2024-07-24

## 2024-07-24 RX ORDER — TAMSULOSIN HYDROCHLORIDE 0.4 MG/1
0.4 CAPSULE ORAL
OUTPATIENT
Start: 2024-07-24 | End: 2024-07-24

## 2024-07-24 NOTE — H&P (VIEW-ONLY)
HISTORY & PHYSICAL      Patient: Yesy Peng                MRN: [id number]       SSN: [id number]  YOB: 1955        AGE: 69 y.o.        SEX: female  Body mass index is 29.7 kg/m².    PCP: Anne-Marie Cortez MD  07/24/24      CC: left hip end stage OA  Problem List Items Addressed This Visit    None        HPI:  The patient is a pleasant 69 y.o. whom has end stage OA of their left hip and has failed conservative treatment including but not limited to NSAIDS, cortisone injections, viscosupplementation, PT, and pain medicine.  Due to the current findings and affected activities of daily living, surgical intervention is indicated.  The alternatives, risks, complications, as well as expected outcome were discussed.  These include but are not limited to infection, blood loss, need for blood transfusion, neurovascular damage, DVT, PE,  post-op stiffness and pain, leg length discrepancy, dislocation, anesthetic complications, prothesis longevity, need for more surgery, MI, stroke, and even death.  The patient understands and wishes to proceed with surgery.      Past Medical History:   Diagnosis Date    Ankle fracture 09/2020    no cast or sx    Arthritis     Rheumatoid arthritis    Ganglion 10/2019    Glaucoma     Hypertension     Osteoporosis     Rheumatoid arthritis (HCC)     Dr. asencio.  Cimzia infusions monthly         Current Outpatient Medications:     lisinopril-hydroCHLOROthiazide (PRINZIDE;ZESTORETIC) 20-12.5 MG per tablet, Take 1 tablet by mouth daily, Disp: 90 tablet, Rfl: 3    amLODIPine (NORVASC) 5 MG tablet, Take 1 tablet by mouth daily, Disp: 90 tablet, Rfl: 3    amoxicillin (AMOXIL) 500 MG capsule, 4 po one hour prior to appt, Disp: 4 capsule, Rfl: 3    Cholecalciferol (VITAMIN D3) 50 MCG (2000 UT) CAPS, Take by mouth daily, Disp: , Rfl:     Multiple Vitamins-Minerals (THERAPEUTIC MULTIVITAMIN-MINERALS) tablet, Take 1 tablet by mouth daily, Disp: , Rfl:     Teriparatide,

## 2024-07-24 NOTE — PROGRESS NOTES
HISTORY & PHYSICAL      Patient: Yesy Peng                MRN: [id number]       SSN: [id number]  YOB: 1955        AGE: 69 y.o.        SEX: female  Body mass index is 29.7 kg/m².    PCP: Anne-Marie Cortez MD  07/24/24      CC: Left hip end stage OA  Problem List Items Addressed This Visit    None        HPI:  The patient is a pleasant 69 y.o. whom has end stage OA of their left hip and has failed conservative treatment including but not limited to NSAIDS, cortisone injections, viscosupplementation, PT, and pain medicine.  Due to the current findings and affected activities of daily living, surgical intervention is indicated.  The alternatives, risks, complications, as well as expected outcome were discussed.  These include but are not limited to infection, blood loss, need for blood transfusion, neurovascular damage, DVT, PE,  post-op stiffness and pain, leg length discrepancy, dislocation, anesthetic complications, prothesis longevity, need for more surgery, MI, stroke, and even death.  The patient understands and wishes to proceed with surgery.      Past Medical History:   Diagnosis Date    Ankle fracture 09/2020    no cast or sx    Arthritis     Rheumatoid arthritis    Ganglion 10/2019    Glaucoma     Hypertension     Osteoporosis     Rheumatoid arthritis (HCC)     Dr. asencio.  Cimzia infusions monthly         Current Outpatient Medications:     lisinopril-hydroCHLOROthiazide (PRINZIDE;ZESTORETIC) 20-12.5 MG per tablet, Take 1 tablet by mouth daily, Disp: 90 tablet, Rfl: 3    amLODIPine (NORVASC) 5 MG tablet, Take 1 tablet by mouth daily, Disp: 90 tablet, Rfl: 3    amoxicillin (AMOXIL) 500 MG capsule, 4 po one hour prior to appt, Disp: 4 capsule, Rfl: 3    Cholecalciferol (VITAMIN D3) 50 MCG (2000 UT) CAPS, Take by mouth daily, Disp: , Rfl:     Multiple Vitamins-Minerals (THERAPEUTIC MULTIVITAMIN-MINERALS) tablet, Take 1 tablet by mouth daily, Disp: , Rfl:     Teriparatide,

## 2024-07-24 NOTE — H&P
Recombinant, (FORTEO SC), Inject into the skin daily, Disp: , Rfl:     Abatacept (ORENCIA CLICKJECT) 125 MG/ML SOAJ, weekly, Disp: , Rfl:     dorzolamide-timolol (COSOPT) 22.3-6.8 MG/ML ophthalmic solution, 1 drop into affected eye, Disp: , Rfl:     latanoprost (XALATAN) 0.005 % ophthalmic solution, Apply 1 drop to eye, Disp: , Rfl:     Allergies   Allergen Reactions    Motrin [Ibuprofen] Anaphylaxis     Nose bleeds       Social History     Socioeconomic History    Marital status:      Spouse name: Not on file    Number of children: Not on file    Years of education: Not on file    Highest education level: Not on file   Occupational History    Not on file   Tobacco Use    Smoking status: Former     Current packs/day: 0.00     Average packs/day: 0.5 packs/day for 30.0 years (15.0 ttl pk-yrs)     Types: Cigarettes     Start date: 1973     Quit date: 2003     Years since quittin.5    Smokeless tobacco: Never   Substance and Sexual Activity    Alcohol use: Not Currently     Comment: quit     Drug use: Never    Sexual activity: Not Currently     Partners: Male   Other Topics Concern    Not on file   Social History Narrative    Not on file     Social Determinants of Health     Financial Resource Strain: Low Risk  (10/10/2023)    Overall Financial Resource Strain (CARDIA)     Difficulty of Paying Living Expenses: Not hard at all   Food Insecurity: Not on file (10/10/2023)   Transportation Needs: Unknown (10/10/2023)    PRAPARE - Transportation     Lack of Transportation (Medical): Not on file     Lack of Transportation (Non-Medical): No   Physical Activity: Sufficiently Active (10/10/2023)    Exercise Vital Sign     Days of Exercise per Week: 7 days     Minutes of Exercise per Session: 50 min   Stress: Not on file   Social Connections: Not on file   Intimate Partner Violence: Not on file   Housing Stability: Unknown (10/10/2023)    Housing Stability Vital Sign     Unable to Pay for Housing in the

## 2024-07-29 ENCOUNTER — OFFICE VISIT (OUTPATIENT)
Facility: CLINIC | Age: 69
End: 2024-07-29
Payer: MEDICARE

## 2024-07-29 VITALS
HEIGHT: 64 IN | TEMPERATURE: 98.2 F | OXYGEN SATURATION: 100 % | SYSTOLIC BLOOD PRESSURE: 125 MMHG | WEIGHT: 171 LBS | RESPIRATION RATE: 18 BRPM | HEART RATE: 95 BPM | DIASTOLIC BLOOD PRESSURE: 70 MMHG | BODY MASS INDEX: 29.19 KG/M2

## 2024-07-29 DIAGNOSIS — I10 ESSENTIAL (PRIMARY) HYPERTENSION: ICD-10-CM

## 2024-07-29 DIAGNOSIS — Z01.818 PREOPERATIVE EVALUATION TO RULE OUT SURGICAL CONTRAINDICATION: Primary | ICD-10-CM

## 2024-07-29 DIAGNOSIS — R79.9 ELEVATED BUN: ICD-10-CM

## 2024-07-29 DIAGNOSIS — E83.52 HYPERCALCEMIA: ICD-10-CM

## 2024-07-29 PROCEDURE — 3078F DIAST BP <80 MM HG: CPT | Performed by: INTERNAL MEDICINE

## 2024-07-29 PROCEDURE — G2211 COMPLEX E/M VISIT ADD ON: HCPCS | Performed by: INTERNAL MEDICINE

## 2024-07-29 PROCEDURE — G8417 CALC BMI ABV UP PARAM F/U: HCPCS | Performed by: INTERNAL MEDICINE

## 2024-07-29 PROCEDURE — G8427 DOCREV CUR MEDS BY ELIG CLIN: HCPCS | Performed by: INTERNAL MEDICINE

## 2024-07-29 PROCEDURE — 99214 OFFICE O/P EST MOD 30 MIN: CPT | Performed by: INTERNAL MEDICINE

## 2024-07-29 PROCEDURE — 3074F SYST BP LT 130 MM HG: CPT | Performed by: INTERNAL MEDICINE

## 2024-07-29 PROCEDURE — 1036F TOBACCO NON-USER: CPT | Performed by: INTERNAL MEDICINE

## 2024-07-29 PROCEDURE — 3017F COLORECTAL CA SCREEN DOC REV: CPT | Performed by: INTERNAL MEDICINE

## 2024-07-29 PROCEDURE — G8399 PT W/DXA RESULTS DOCUMENT: HCPCS | Performed by: INTERNAL MEDICINE

## 2024-07-29 PROCEDURE — 1090F PRES/ABSN URINE INCON ASSESS: CPT | Performed by: INTERNAL MEDICINE

## 2024-07-29 PROCEDURE — 1123F ACP DISCUSS/DSCN MKR DOCD: CPT | Performed by: INTERNAL MEDICINE

## 2024-07-29 RX ORDER — LISINOPRIL 20 MG/1
20 TABLET ORAL DAILY
Qty: 90 TABLET | Refills: 1 | Status: SHIPPED | OUTPATIENT
Start: 2024-07-29

## 2024-07-29 ASSESSMENT — PATIENT HEALTH QUESTIONNAIRE - PHQ9
SUM OF ALL RESPONSES TO PHQ QUESTIONS 1-9: 0
SUM OF ALL RESPONSES TO PHQ9 QUESTIONS 1 & 2: 0
SUM OF ALL RESPONSES TO PHQ QUESTIONS 1-9: 0
2. FEELING DOWN, DEPRESSED OR HOPELESS: NOT AT ALL
1. LITTLE INTEREST OR PLEASURE IN DOING THINGS: NOT AT ALL

## 2024-07-29 ASSESSMENT — ENCOUNTER SYMPTOMS: SHORTNESS OF BREATH: 0

## 2024-07-30 NOTE — PROGRESS NOTES
HPI     Yesy Peng is here with her sister for preoperative evaluation prior to left total hip replacement.    Denies any postoperative nausea or vomiting, just brief dizziness.  Has had a right hip replacement, cholecystectomy, and carpal tunnel releases without perioperative complications.    Denies signs or symptoms of infection, personal or family history of postoperative DVT or PE, and family or personal history of malignant hyperthermia.    She is not on any potential anticoagulants, including fish oil or vitamin E.    Past Medical History:   Diagnosis Date    Ankle fracture 09/2020    no cast or sx    Arthritis     Rheumatoid arthritis    Ganglion 10/2019    Glaucoma     Hypertension     Osteoporosis     Rheumatoid arthritis (HCC)     Dr. asencio.  Cimzia infusions monthly        Past Surgical History:   Procedure Laterality Date    CARPAL TUNNEL RELEASE Left 2017    CARPAL TUNNEL RELEASE Right 2016    CHOLECYSTECTOMY  1993    TOTAL HIP ARTHROPLASTY Right 6/5/2023    RIGHT TOTAL HIP ARTHROPLASTY LATERAL performed by Josh Galvez MD at [facility] MAIN OR        Current Outpatient Medications   Medication Sig Dispense Refill    lisinopril (PRINIVIL;ZESTRIL) 20 MG tablet Take 1 tablet by mouth daily 90 tablet 1    lisinopril-hydroCHLOROthiazide (PRINZIDE;ZESTORETIC) 20-12.5 MG per tablet Take 1 tablet by mouth daily 90 tablet 3    amLODIPine (NORVASC) 5 MG tablet Take 1 tablet by mouth daily 90 tablet 3    amoxicillin (AMOXIL) 500 MG capsule 4 po one hour prior to appt 4 capsule 3    Cholecalciferol (VITAMIN D3) 50 MCG (2000 UT) CAPS Take by mouth daily      Multiple Vitamins-Minerals (THERAPEUTIC MULTIVITAMIN-MINERALS) tablet Take 1 tablet by mouth daily      Abatacept (ORENCIA CLICKJECT) 125 MG/ML SOAJ weekly      dorzolamide-timolol (COSOPT) 22.3-6.8 MG/ML ophthalmic solution 1 drop into affected eye      latanoprost (XALATAN) 0.005 % ophthalmic solution Apply 1 drop to eye       No current

## 2024-08-06 ASSESSMENT — HOOS JR
HOOS JR RAW SCORE: 12
HOOS JR TOTAL INTERVAL SCORE: 52.965
WALKING ON UNEVEN SURFACE: SEVERE
RISING FROM SITTING: MODERATE
BENDING TO THE FLOOR TO PICK UP OBJECT: MODERATE
HOOS JR RAW SCORE: 12
LYING IN BED (TURNING OVER, MAINTAINING HIP POSITION): SEVERE
GOING UP OR DOWN STAIRS: MODERATE

## 2024-08-06 ASSESSMENT — PROMIS GLOBAL HEALTH SCALE
SUM OF RESPONSES TO QUESTIONS 3, 6, 7, & 8: 15
IN GENERAL, WOULD YOU SAY YOUR HEALTH IS...[ON A SCALE OF 1 (POOR) TO 5 (EXCELLENT)]: GOOD
SUM OF RESPONSES TO QUESTIONS 2, 4, 5, & 10: 14
IN THE PAST 7 DAYS, HOW OFTEN HAVE YOU BEEN BOTHERED BY EMOTIONAL PROBLEMS, SUCH AS FEELING ANXIOUS, DEPRESSED, OR IRRITABLE [ON A SCALE FROM 1 (NEVER) TO 5 (ALWAYS)]?: NEVER
IN GENERAL, HOW WOULD YOU RATE YOUR MENTAL HEALTH, INCLUDING YOUR MOOD AND YOUR ABILITY TO THINK [ON A SCALE OF 1 (POOR) TO 5 (EXCELLENT)]?: GOOD
IN GENERAL, WOULD YOU SAY YOUR QUALITY OF LIFE IS...[ON A SCALE OF 1 (POOR) TO 5 (EXCELLENT)]: GOOD
TO WHAT EXTENT ARE YOU ABLE TO CARRY OUT YOUR EVERYDAY PHYSICAL ACTIVITIES SUCH AS WALKING, CLIMBING STAIRS, CARRYING GROCERIES, OR MOVING A CHAIR [ON A SCALE OF 1 (NOT AT ALL) TO 5 (COMPLETELY)]?: MODERATELY
IN GENERAL, HOW WOULD YOU RATE YOUR SATISFACTION WITH YOUR SOCIAL ACTIVITIES AND RELATIONSHIPS [ON A SCALE OF 1 (POOR) TO 5 (EXCELLENT)]?: GOOD
HOW IS THE PROMIS V1.1 BEING ADMINISTERED?: TELEPHONE
IN THE PAST 7 DAYS, HOW WOULD YOU RATE YOUR PAIN ON AVERAGE [ON A SCALE FROM 0 (NO PAIN) TO 10 (WORST IMAGINABLE PAIN)]?: 6
IN THE PAST 7 DAYS, HOW WOULD YOU RATE YOUR FATIGUE ON AVERAGE [ON A SCALE FROM 1 (NONE) TO 5 (VERY SEVERE)]?: MILD
WHO IS THE PERSON COMPLETING THE PROMIS V1.1 SURVEY?: SELF
IN GENERAL, HOW WOULD YOU RATE YOUR PHYSICAL HEALTH [ON A SCALE OF 1 (POOR) TO 5 (EXCELLENT)]?: FAIR
IN GENERAL, PLEASE RATE HOW WELL YOU CARRY OUT YOUR USUAL SOCIAL ACTIVITIES (INCLUDES ACTIVITIES AT HOME, AT WORK, AND IN YOUR COMMUNITY, AND RESPONSIBILITIES AS A PARENT, CHILD, SPOUSE, EMPLOYEE, FRIEND, ETC) [ON A SCALE OF 1 (POOR) TO 5 (EXCELLENT)]?: FAIR

## 2024-08-06 NOTE — PROGRESS NOTES
Instructions for your surgery at [facility]      Today's Date:  8/6/2024      Patient's Name:  Yesy Peng           Surgery Date:  8/13/2024              Please enter the main entrance of the hospital and check-in at the front security desk located in the lobby. They will direct you to the area to report for your surgery.     Do NOT eat or drink anything, including candy, gum, or ice chips after midnight prior to your surgery, unless you have specific instructions from your surgeon or anesthesia provider to do so.  Brush your teeth before coming to the hospital. You may swish with water, but do not swallow.  No smoking/Vaping/E-Cigarettes 24 hours prior to the day of surgery.  No alcohol 24 hours prior to the day of surgery.  No recreational drugs for one week prior to the day of surgery.  Bring Photo ID, Insurance information, and Co-pay if required on day of surgery.  Bring in pertinent legal documents, such as, Medical Power of , DNR, Advance Directive, etc.  Leave all valuables, including money/purse, at home.  Remove all jewelry, including ALL body piercings, nail polish, acrylic nails, and makeup (including mascara); no lotions, powders, deodorant, or perfume/cologne/after shave on the skin.  Follow instruction for Hibiclens washes and CHG wipes from surgeon's office.   Glasses and dentures may be worn to the hospital. They must be removed prior to surgery. Please bring case/container for glasses or dentures.   Contact lenses should not be worn on day of surgery.   Call your doctor's office if symptoms of a cold or illness develop within 24-48 hours prior to your surgery.  Call your doctor's office if you have any questions concerning insurance or co-pays.  15. AN ADULT (relative or friend 18 years or older) MUST DRIVE YOU HOME AFTER YOUR SURGERY.  16. Please make arrangements for a responsible adult (18 years or older) to be with you for 24 hours after your surgery.   17.

## 2024-08-12 ENCOUNTER — TELEPHONE (OUTPATIENT)
Facility: HOSPITAL | Age: 69
End: 2024-08-12

## 2024-08-12 ENCOUNTER — ANESTHESIA EVENT (OUTPATIENT)
Facility: HOSPITAL | Age: 69
End: 2024-08-12
Payer: MEDICARE

## 2024-08-12 NOTE — TELEPHONE ENCOUNTER
Attempted to reach patient regarding preoperative education. VM left to return call to coordinator.

## 2024-08-13 ENCOUNTER — ANESTHESIA (OUTPATIENT)
Facility: HOSPITAL | Age: 69
End: 2024-08-13
Payer: MEDICARE

## 2024-08-13 ENCOUNTER — HOSPITAL ENCOUNTER (OUTPATIENT)
Facility: HOSPITAL | Age: 69
Setting detail: OBSERVATION
Discharge: HOME HEALTH | End: 2024-08-13
Attending: ORTHOPAEDIC SURGERY | Admitting: ORTHOPAEDIC SURGERY
Payer: MEDICARE

## 2024-08-13 ENCOUNTER — APPOINTMENT (OUTPATIENT)
Facility: HOSPITAL | Age: 69
End: 2024-08-13
Attending: ORTHOPAEDIC SURGERY
Payer: MEDICARE

## 2024-08-13 ENCOUNTER — TELEPHONE (OUTPATIENT)
Age: 69
End: 2024-08-13

## 2024-08-13 VITALS
TEMPERATURE: 97.6 F | OXYGEN SATURATION: 99 % | HEIGHT: 64 IN | DIASTOLIC BLOOD PRESSURE: 72 MMHG | HEART RATE: 92 BPM | SYSTOLIC BLOOD PRESSURE: 117 MMHG | WEIGHT: 171.2 LBS | BODY MASS INDEX: 29.23 KG/M2 | RESPIRATION RATE: 16 BRPM

## 2024-08-13 DIAGNOSIS — S82.001A CLOSED DISPLACED FRACTURE OF RIGHT PATELLA, UNSPECIFIED FRACTURE MORPHOLOGY, INITIAL ENCOUNTER: ICD-10-CM

## 2024-08-13 DIAGNOSIS — M16.11 ARTHRITIS OF RIGHT HIP: Primary | ICD-10-CM

## 2024-08-13 PROBLEM — Q65.89 HIP DYSPLASIA: Status: ACTIVE | Noted: 2024-08-13

## 2024-08-13 PROBLEM — M21.252: Status: ACTIVE | Noted: 2024-08-13

## 2024-08-13 PROBLEM — M24.652 ANKYLOSIS OF LEFT HIP: Status: ACTIVE | Noted: 2024-08-13

## 2024-08-13 PROBLEM — M83.9 OSTEOMALACIA: Status: ACTIVE | Noted: 2024-08-13

## 2024-08-13 PROBLEM — M16.10 ARTHRITIS OF HIP: Status: ACTIVE | Noted: 2024-08-13

## 2024-08-13 PROBLEM — M76.892: Status: ACTIVE | Noted: 2024-08-13

## 2024-08-13 PROCEDURE — 6360000002 HC RX W HCPCS: Performed by: ORTHOPAEDIC SURGERY

## 2024-08-13 PROCEDURE — 88311 DECALCIFY TISSUE: CPT

## 2024-08-13 PROCEDURE — G0378 HOSPITAL OBSERVATION PER HR: HCPCS

## 2024-08-13 PROCEDURE — 27130 TOTAL HIP ARTHROPLASTY: CPT | Performed by: ORTHOPAEDIC SURGERY

## 2024-08-13 PROCEDURE — 2580000003 HC RX 258: Performed by: ORTHOPAEDIC SURGERY

## 2024-08-13 PROCEDURE — 6370000000 HC RX 637 (ALT 250 FOR IP): Performed by: ORTHOPAEDIC SURGERY

## 2024-08-13 PROCEDURE — 2580000003 HC RX 258: Performed by: NURSE ANESTHETIST, CERTIFIED REGISTERED

## 2024-08-13 PROCEDURE — 88304 TISSUE EXAM BY PATHOLOGIST: CPT

## 2024-08-13 PROCEDURE — 6360000002 HC RX W HCPCS: Performed by: PHYSICIAN ASSISTANT

## 2024-08-13 PROCEDURE — 97116 GAIT TRAINING THERAPY: CPT

## 2024-08-13 PROCEDURE — 6370000000 HC RX 637 (ALT 250 FOR IP): Performed by: NURSE ANESTHETIST, CERTIFIED REGISTERED

## 2024-08-13 PROCEDURE — 73502 X-RAY EXAM HIP UNI 2-3 VIEWS: CPT

## 2024-08-13 PROCEDURE — 3700000000 HC ANESTHESIA ATTENDED CARE: Performed by: ORTHOPAEDIC SURGERY

## 2024-08-13 PROCEDURE — 97535 SELF CARE MNGMENT TRAINING: CPT

## 2024-08-13 PROCEDURE — 76942 ECHO GUIDE FOR BIOPSY: CPT | Performed by: ANESTHESIOLOGY

## 2024-08-13 PROCEDURE — 3600000003 HC SURGERY LEVEL 3 BASE: Performed by: ORTHOPAEDIC SURGERY

## 2024-08-13 PROCEDURE — C9290 INJ, BUPIVACAINE LIPOSOME: HCPCS | Performed by: PHYSICIAN ASSISTANT

## 2024-08-13 PROCEDURE — 7100000001 HC PACU RECOVERY - ADDTL 15 MIN: Performed by: ORTHOPAEDIC SURGERY

## 2024-08-13 PROCEDURE — 97530 THERAPEUTIC ACTIVITIES: CPT

## 2024-08-13 PROCEDURE — 2500000003 HC RX 250 WO HCPCS: Performed by: PHYSICIAN ASSISTANT

## 2024-08-13 PROCEDURE — 27130 TOTAL HIP ARTHROPLASTY: CPT | Performed by: PHYSICIAN ASSISTANT

## 2024-08-13 PROCEDURE — 94761 N-INVAS EAR/PLS OXIMETRY MLT: CPT

## 2024-08-13 PROCEDURE — 97165 OT EVAL LOW COMPLEX 30 MIN: CPT

## 2024-08-13 PROCEDURE — 2500000003 HC RX 250 WO HCPCS: Performed by: NURSE ANESTHETIST, CERTIFIED REGISTERED

## 2024-08-13 PROCEDURE — 2709999900 HC NON-CHARGEABLE SUPPLY: Performed by: ORTHOPAEDIC SURGERY

## 2024-08-13 PROCEDURE — 6360000002 HC RX W HCPCS: Performed by: NURSE ANESTHETIST, CERTIFIED REGISTERED

## 2024-08-13 PROCEDURE — 97162 PT EVAL MOD COMPLEX 30 MIN: CPT

## 2024-08-13 PROCEDURE — 6370000000 HC RX 637 (ALT 250 FOR IP): Performed by: PHYSICIAN ASSISTANT

## 2024-08-13 PROCEDURE — A4217 STERILE WATER/SALINE, 500 ML: HCPCS | Performed by: ORTHOPAEDIC SURGERY

## 2024-08-13 PROCEDURE — 7100000000 HC PACU RECOVERY - FIRST 15 MIN: Performed by: ORTHOPAEDIC SURGERY

## 2024-08-13 PROCEDURE — 3600000013 HC SURGERY LEVEL 3 ADDTL 15MIN: Performed by: ORTHOPAEDIC SURGERY

## 2024-08-13 PROCEDURE — 2500000003 HC RX 250 WO HCPCS: Performed by: ORTHOPAEDIC SURGERY

## 2024-08-13 PROCEDURE — 2720000010 HC SURG SUPPLY STERILE: Performed by: ORTHOPAEDIC SURGERY

## 2024-08-13 PROCEDURE — 6360000002 HC RX W HCPCS: Performed by: ANESTHESIOLOGY

## 2024-08-13 PROCEDURE — 2580000003 HC RX 258: Performed by: PHYSICIAN ASSISTANT

## 2024-08-13 PROCEDURE — C1776 JOINT DEVICE (IMPLANTABLE): HCPCS | Performed by: ORTHOPAEDIC SURGERY

## 2024-08-13 PROCEDURE — 3700000001 HC ADD 15 MINUTES (ANESTHESIA): Performed by: ORTHOPAEDIC SURGERY

## 2024-08-13 DEVICE — SHELL ACET SZ E DIA52MM 5 CLUS H TRITANIUM PRESSFIT PRI: Type: IMPLANTABLE DEVICE | Site: HIP | Status: FUNCTIONAL

## 2024-08-13 DEVICE — SCREW BNE L25MM DIA6.5MM HEX LO PROF TRIDENT II: Type: IMPLANTABLE DEVICE | Site: HIP | Status: FUNCTIONAL

## 2024-08-13 DEVICE — HEX DOME HOLE PLUG: Type: IMPLANTABLE DEVICE | Site: HIP | Status: FUNCTIONAL

## 2024-08-13 DEVICE — SCREW BNE L20MM DIA65MM LO PROF HEX TRIDENT LL: Type: IMPLANTABLE DEVICE | Site: HIP | Status: FUNCTIONAL

## 2024-08-13 DEVICE — INSERT ACET E 0 DEG 36 MM HIP X3 TRIDENT: Type: IMPLANTABLE DEVICE | Site: HIP | Status: FUNCTIONAL

## 2024-08-13 DEVICE — STEM FEM SZ 6 L111MM NK L35MM 45MM OFFSET 127DEG HIP TI: Type: IMPLANTABLE DEVICE | Site: HIP | Status: FUNCTIONAL

## 2024-08-13 DEVICE — HEAD FEM DIA36MM -5MM OFFSET HIP BIOLOX DELT CERAMIC TAPR: Type: IMPLANTABLE DEVICE | Site: HIP | Status: FUNCTIONAL

## 2024-08-13 RX ORDER — TAMSULOSIN HYDROCHLORIDE 0.4 MG/1
0.4 CAPSULE ORAL
Status: COMPLETED | OUTPATIENT
Start: 2024-08-13 | End: 2024-08-13

## 2024-08-13 RX ORDER — DOCUSATE SODIUM 100 MG/1
100 CAPSULE, LIQUID FILLED ORAL 2 TIMES DAILY
Qty: 60 CAPSULE | Refills: 1 | Status: SHIPPED | OUTPATIENT
Start: 2024-08-13 | End: 2024-10-12

## 2024-08-13 RX ORDER — FAMOTIDINE 20 MG/1
20 TABLET, FILM COATED ORAL ONCE
Status: COMPLETED | OUTPATIENT
Start: 2024-08-13 | End: 2024-08-13

## 2024-08-13 RX ORDER — ONDANSETRON 2 MG/ML
INJECTION INTRAMUSCULAR; INTRAVENOUS PRN
Status: DISCONTINUED | OUTPATIENT
Start: 2024-08-13 | End: 2024-08-13 | Stop reason: SDUPTHER

## 2024-08-13 RX ORDER — SODIUM CHLORIDE 9 MG/ML
INJECTION, SOLUTION INTRAVENOUS PRN
Status: DISCONTINUED | OUTPATIENT
Start: 2024-08-13 | End: 2024-08-13 | Stop reason: HOSPADM

## 2024-08-13 RX ORDER — MIDAZOLAM HYDROCHLORIDE 2 MG/2ML
2 INJECTION, SOLUTION INTRAMUSCULAR; INTRAVENOUS ONCE
Status: COMPLETED | OUTPATIENT
Start: 2024-08-13 | End: 2024-08-13

## 2024-08-13 RX ORDER — PHENYLEPHRINE HCL IN 0.9% NACL 1 MG/10 ML
SYRINGE (ML) INTRAVENOUS PRN
Status: DISCONTINUED | OUTPATIENT
Start: 2024-08-13 | End: 2024-08-13 | Stop reason: SDUPTHER

## 2024-08-13 RX ORDER — ROPIVACAINE HYDROCHLORIDE 2 MG/ML
30 INJECTION, SOLUTION EPIDURAL; INFILTRATION; PERINEURAL ONCE
Status: COMPLETED | OUTPATIENT
Start: 2024-08-13 | End: 2024-08-13

## 2024-08-13 RX ORDER — SODIUM CHLORIDE, SODIUM LACTATE, POTASSIUM CHLORIDE, CALCIUM CHLORIDE 600; 310; 30; 20 MG/100ML; MG/100ML; MG/100ML; MG/100ML
INJECTION, SOLUTION INTRAVENOUS CONTINUOUS
Status: DISCONTINUED | OUTPATIENT
Start: 2024-08-13 | End: 2024-08-13 | Stop reason: HOSPADM

## 2024-08-13 RX ORDER — TRAMADOL HYDROCHLORIDE 50 MG/1
50 TABLET ORAL
Status: COMPLETED | OUTPATIENT
Start: 2024-08-13 | End: 2024-08-13

## 2024-08-13 RX ORDER — LIDOCAINE HYDROCHLORIDE 20 MG/ML
INJECTION, SOLUTION EPIDURAL; INFILTRATION; INTRACAUDAL; PERINEURAL PRN
Status: DISCONTINUED | OUTPATIENT
Start: 2024-08-13 | End: 2024-08-13 | Stop reason: SDUPTHER

## 2024-08-13 RX ORDER — OXYCODONE HYDROCHLORIDE 5 MG/1
5 TABLET ORAL EVERY 4 HOURS PRN
Qty: 42 TABLET | Refills: 0 | Status: SHIPPED | OUTPATIENT
Start: 2024-08-13 | End: 2024-08-20

## 2024-08-13 RX ORDER — LIDOCAINE HYDROCHLORIDE 10 MG/ML
1 INJECTION, SOLUTION EPIDURAL; INFILTRATION; INTRACAUDAL; PERINEURAL
Status: COMPLETED | OUTPATIENT
Start: 2024-08-13 | End: 2024-08-13

## 2024-08-13 RX ORDER — POLYETHYLENE GLYCOL 3350 17 G/17G
17 POWDER, FOR SOLUTION ORAL DAILY PRN
Status: DISCONTINUED | OUTPATIENT
Start: 2024-08-13 | End: 2024-08-13 | Stop reason: HOSPADM

## 2024-08-13 RX ORDER — ONDANSETRON 4 MG/1
4 TABLET, ORALLY DISINTEGRATING ORAL EVERY 6 HOURS PRN
Status: DISCONTINUED | OUTPATIENT
Start: 2024-08-13 | End: 2024-08-13 | Stop reason: HOSPADM

## 2024-08-13 RX ORDER — SODIUM CHLORIDE 9 MG/ML
INJECTION, SOLUTION INTRAVENOUS CONTINUOUS
Status: DISCONTINUED | OUTPATIENT
Start: 2024-08-13 | End: 2024-08-13 | Stop reason: HOSPADM

## 2024-08-13 RX ORDER — SODIUM CHLORIDE 0.9 % (FLUSH) 0.9 %
5-40 SYRINGE (ML) INJECTION PRN
Status: DISCONTINUED | OUTPATIENT
Start: 2024-08-13 | End: 2024-08-13 | Stop reason: HOSPADM

## 2024-08-13 RX ORDER — PROCHLORPERAZINE EDISYLATE 5 MG/ML
5 INJECTION INTRAMUSCULAR; INTRAVENOUS
Status: DISCONTINUED | OUTPATIENT
Start: 2024-08-13 | End: 2024-08-13 | Stop reason: HOSPADM

## 2024-08-13 RX ORDER — FENTANYL CITRATE 50 UG/ML
100 INJECTION, SOLUTION INTRAMUSCULAR; INTRAVENOUS ONCE
Status: COMPLETED | OUTPATIENT
Start: 2024-08-13 | End: 2024-08-13

## 2024-08-13 RX ORDER — SODIUM CHLORIDE 0.9 % (FLUSH) 0.9 %
5-40 SYRINGE (ML) INJECTION EVERY 12 HOURS SCHEDULED
Status: DISCONTINUED | OUTPATIENT
Start: 2024-08-13 | End: 2024-08-13 | Stop reason: HOSPADM

## 2024-08-13 RX ORDER — ASPIRIN 81 MG/1
81 TABLET ORAL 2 TIMES DAILY
Status: DISCONTINUED | OUTPATIENT
Start: 2024-08-14 | End: 2024-08-13 | Stop reason: HOSPADM

## 2024-08-13 RX ORDER — ASPIRIN 81 MG/1
81 TABLET ORAL 2 TIMES DAILY
Qty: 60 TABLET | Refills: 3 | Status: SHIPPED | OUTPATIENT
Start: 2024-08-13

## 2024-08-13 RX ORDER — EPHEDRINE SULFATE/0.9% NACL/PF 25 MG/5 ML
SYRINGE (ML) INTRAVENOUS PRN
Status: DISCONTINUED | OUTPATIENT
Start: 2024-08-13 | End: 2024-08-13 | Stop reason: SDUPTHER

## 2024-08-13 RX ORDER — PREGABALIN 75 MG/1
75 CAPSULE ORAL
Status: COMPLETED | OUTPATIENT
Start: 2024-08-13 | End: 2024-08-13

## 2024-08-13 RX ORDER — ROPIVACAINE HYDROCHLORIDE 2 MG/ML
INJECTION, SOLUTION EPIDURAL; INFILTRATION; PERINEURAL
Status: COMPLETED | OUTPATIENT
Start: 2024-08-13 | End: 2024-08-13

## 2024-08-13 RX ORDER — AMLODIPINE BESYLATE 5 MG/1
5 TABLET ORAL DAILY
Status: DISCONTINUED | OUTPATIENT
Start: 2024-08-14 | End: 2024-08-13 | Stop reason: HOSPADM

## 2024-08-13 RX ORDER — OXYCODONE HYDROCHLORIDE 5 MG/1
5 TABLET ORAL
Status: DISCONTINUED | OUTPATIENT
Start: 2024-08-13 | End: 2024-08-13 | Stop reason: HOSPADM

## 2024-08-13 RX ORDER — CEFADROXIL 500 MG/1
500 CAPSULE ORAL 2 TIMES DAILY
Qty: 10 CAPSULE | Refills: 0 | Status: SHIPPED | OUTPATIENT
Start: 2024-08-13 | End: 2024-08-18

## 2024-08-13 RX ORDER — LATANOPROST 50 UG/ML
1 SOLUTION/ DROPS OPHTHALMIC NIGHTLY
Status: DISCONTINUED | OUTPATIENT
Start: 2024-08-13 | End: 2024-08-13 | Stop reason: HOSPADM

## 2024-08-13 RX ORDER — DEXAMETHASONE SODIUM PHOSPHATE 4 MG/ML
8 INJECTION, SOLUTION INTRA-ARTICULAR; INTRALESIONAL; INTRAMUSCULAR; INTRAVENOUS; SOFT TISSUE
Status: COMPLETED | OUTPATIENT
Start: 2024-08-13 | End: 2024-08-13

## 2024-08-13 RX ORDER — ONDANSETRON 4 MG/1
4 TABLET, FILM COATED ORAL EVERY 8 HOURS PRN
Qty: 30 TABLET | Refills: 2 | Status: SHIPPED | OUTPATIENT
Start: 2024-08-13

## 2024-08-13 RX ORDER — ACETAMINOPHEN 500 MG
1000 TABLET ORAL EVERY 8 HOURS PRN
Qty: 90 TABLET | Refills: 1 | Status: SHIPPED | OUTPATIENT
Start: 2024-08-13

## 2024-08-13 RX ORDER — ONDANSETRON 2 MG/ML
4 INJECTION INTRAMUSCULAR; INTRAVENOUS EVERY 6 HOURS PRN
Status: DISCONTINUED | OUTPATIENT
Start: 2024-08-13 | End: 2024-08-13 | Stop reason: HOSPADM

## 2024-08-13 RX ORDER — LISINOPRIL AND HYDROCHLOROTHIAZIDE 20; 12.5 MG/1; MG/1
1 TABLET ORAL DAILY
Status: DISCONTINUED | OUTPATIENT
Start: 2024-08-13 | End: 2024-08-13 | Stop reason: CLARIF

## 2024-08-13 RX ORDER — MAGNESIUM HYDROXIDE 1200 MG/15ML
LIQUID ORAL CONTINUOUS PRN
Status: DISCONTINUED | OUTPATIENT
Start: 2024-08-13 | End: 2024-08-13 | Stop reason: HOSPADM

## 2024-08-13 RX ORDER — ROCURONIUM BROMIDE 10 MG/ML
INJECTION, SOLUTION INTRAVENOUS PRN
Status: DISCONTINUED | OUTPATIENT
Start: 2024-08-13 | End: 2024-08-13 | Stop reason: SDUPTHER

## 2024-08-13 RX ORDER — SENNA AND DOCUSATE SODIUM 50; 8.6 MG/1; MG/1
1 TABLET, FILM COATED ORAL 2 TIMES DAILY
Status: DISCONTINUED | OUTPATIENT
Start: 2024-08-13 | End: 2024-08-13 | Stop reason: HOSPADM

## 2024-08-13 RX ORDER — FAMOTIDINE 20 MG/1
20 TABLET, FILM COATED ORAL 2 TIMES DAILY
Status: DISCONTINUED | OUTPATIENT
Start: 2024-08-13 | End: 2024-08-13 | Stop reason: HOSPADM

## 2024-08-13 RX ORDER — KETOROLAC TROMETHAMINE 15 MG/ML
15 INJECTION, SOLUTION INTRAMUSCULAR; INTRAVENOUS EVERY 6 HOURS
Status: DISCONTINUED | OUTPATIENT
Start: 2024-08-13 | End: 2024-08-13 | Stop reason: HOSPADM

## 2024-08-13 RX ORDER — FENTANYL CITRATE 50 UG/ML
25 INJECTION, SOLUTION INTRAMUSCULAR; INTRAVENOUS EVERY 5 MIN PRN
Status: DISCONTINUED | OUTPATIENT
Start: 2024-08-13 | End: 2024-08-13 | Stop reason: HOSPADM

## 2024-08-13 RX ORDER — ACETAMINOPHEN 500 MG
1000 TABLET ORAL EVERY 6 HOURS
Status: DISCONTINUED | OUTPATIENT
Start: 2024-08-13 | End: 2024-08-13 | Stop reason: HOSPADM

## 2024-08-13 RX ORDER — ACETAMINOPHEN 500 MG
1000 TABLET ORAL
Status: COMPLETED | OUTPATIENT
Start: 2024-08-13 | End: 2024-08-13

## 2024-08-13 RX ORDER — HYDROCHLOROTHIAZIDE 25 MG/1
12.5 TABLET ORAL DAILY
Status: DISCONTINUED | OUTPATIENT
Start: 2024-08-14 | End: 2024-08-13 | Stop reason: HOSPADM

## 2024-08-13 RX ORDER — PROPOFOL 10 MG/ML
INJECTION, EMULSION INTRAVENOUS PRN
Status: DISCONTINUED | OUTPATIENT
Start: 2024-08-13 | End: 2024-08-13 | Stop reason: SDUPTHER

## 2024-08-13 RX ORDER — SUCCINYLCHOLINE/SOD CL,ISO/PF 100 MG/5ML
SYRINGE (ML) INTRAVENOUS PRN
Status: DISCONTINUED | OUTPATIENT
Start: 2024-08-13 | End: 2024-08-13 | Stop reason: SDUPTHER

## 2024-08-13 RX ORDER — FENTANYL CITRATE 50 UG/ML
INJECTION, SOLUTION INTRAMUSCULAR; INTRAVENOUS PRN
Status: DISCONTINUED | OUTPATIENT
Start: 2024-08-13 | End: 2024-08-13 | Stop reason: SDUPTHER

## 2024-08-13 RX ORDER — DORZOLAMIDE HYDROCHLORIDE AND TIMOLOL MALEATE 20; 5 MG/ML; MG/ML
1 SOLUTION/ DROPS OPHTHALMIC 2 TIMES DAILY
Status: DISCONTINUED | OUTPATIENT
Start: 2024-08-13 | End: 2024-08-13 | Stop reason: HOSPADM

## 2024-08-13 RX ORDER — OXYCODONE HYDROCHLORIDE 5 MG/1
2.5 TABLET ORAL
Status: DISCONTINUED | OUTPATIENT
Start: 2024-08-13 | End: 2024-08-13 | Stop reason: HOSPADM

## 2024-08-13 RX ORDER — ONDANSETRON 2 MG/ML
4 INJECTION INTRAMUSCULAR; INTRAVENOUS
Status: DISCONTINUED | OUTPATIENT
Start: 2024-08-13 | End: 2024-08-13 | Stop reason: HOSPADM

## 2024-08-13 RX ORDER — NALOXONE HYDROCHLORIDE 0.4 MG/ML
INJECTION, SOLUTION INTRAMUSCULAR; INTRAVENOUS; SUBCUTANEOUS PRN
Status: DISCONTINUED | OUTPATIENT
Start: 2024-08-13 | End: 2024-08-13 | Stop reason: HOSPADM

## 2024-08-13 RX ORDER — LISINOPRIL 5 MG/1
20 TABLET ORAL DAILY
Status: DISCONTINUED | OUTPATIENT
Start: 2024-08-14 | End: 2024-08-13 | Stop reason: HOSPADM

## 2024-08-13 RX ADMIN — KETOROLAC TROMETHAMINE 15 MG: 15 INJECTION, SOLUTION INTRAMUSCULAR; INTRAVENOUS at 14:28

## 2024-08-13 RX ADMIN — EPHEDRINE SULFATE 5 MG: 5 INJECTION INTRAVENOUS at 11:08

## 2024-08-13 RX ADMIN — ROPIVACAINE HYDROCHLORIDE 30 ML: 2 INJECTION EPIDURAL; INFILTRATION; PERINEURAL at 09:48

## 2024-08-13 RX ADMIN — SUGAMMADEX 200 MG: 100 INJECTION, SOLUTION INTRAVENOUS at 11:34

## 2024-08-13 RX ADMIN — TRANEXAMIC ACID 1000 MG: 100 INJECTION, SOLUTION INTRAVENOUS at 10:30

## 2024-08-13 RX ADMIN — EPHEDRINE SULFATE 5 MG: 5 INJECTION INTRAVENOUS at 11:34

## 2024-08-13 RX ADMIN — SODIUM CHLORIDE, SODIUM LACTATE, POTASSIUM CHLORIDE, AND CALCIUM CHLORIDE: 600; 310; 30; 20 INJECTION, SOLUTION INTRAVENOUS at 11:00

## 2024-08-13 RX ADMIN — EPHEDRINE SULFATE 5 MG: 5 INJECTION INTRAVENOUS at 11:29

## 2024-08-13 RX ADMIN — WATER 2000 MG: 1 INJECTION INTRAMUSCULAR; INTRAVENOUS; SUBCUTANEOUS at 16:32

## 2024-08-13 RX ADMIN — ROCURONIUM BROMIDE 30 MG: 50 INJECTION INTRAVENOUS at 10:28

## 2024-08-13 RX ADMIN — KETOROLAC TROMETHAMINE: 15 INJECTION, SOLUTION INTRAMUSCULAR; INTRAVENOUS at 11:47

## 2024-08-13 RX ADMIN — ACETAMINOPHEN 1000 MG: 500 TABLET ORAL at 08:34

## 2024-08-13 RX ADMIN — Medication 200 MCG: at 10:52

## 2024-08-13 RX ADMIN — DEXAMETHASONE SODIUM PHOSPHATE 8 MG: 4 INJECTION, SOLUTION INTRAMUSCULAR; INTRAVENOUS at 10:28

## 2024-08-13 RX ADMIN — WATER 2000 MG: 1 INJECTION INTRAMUSCULAR; INTRAVENOUS; SUBCUTANEOUS at 10:25

## 2024-08-13 RX ADMIN — MIDAZOLAM 2 MG: 1 INJECTION INTRAMUSCULAR; INTRAVENOUS at 09:46

## 2024-08-13 RX ADMIN — LIDOCAINE HYDROCHLORIDE 100 MG: 20 INJECTION, SOLUTION EPIDURAL; INFILTRATION; INTRACAUDAL; PERINEURAL at 10:19

## 2024-08-13 RX ADMIN — DORZOLAMIDE HYDROCHLORIDE AND TIMOLOL MALEATE 1 DROP: 20; 5 SOLUTION/ DROPS OPHTHALMIC at 16:29

## 2024-08-13 RX ADMIN — LIDOCAINE HYDROCHLORIDE 1.5 ML: 10 INJECTION, SOLUTION EPIDURAL; INFILTRATION; INTRACAUDAL; PERINEURAL at 09:46

## 2024-08-13 RX ADMIN — ROPIVACAINE HYDROCHLORIDE 30 ML: 2 INJECTION, SOLUTION EPIDURAL; INFILTRATION at 09:55

## 2024-08-13 RX ADMIN — FAMOTIDINE 20 MG: 20 TABLET ORAL at 08:34

## 2024-08-13 RX ADMIN — EPHEDRINE SULFATE 5 MG: 5 INJECTION INTRAVENOUS at 11:15

## 2024-08-13 RX ADMIN — ONDANSETRON 4 MG: 2 INJECTION INTRAMUSCULAR; INTRAVENOUS at 11:34

## 2024-08-13 RX ADMIN — Medication 100 MCG: at 10:31

## 2024-08-13 RX ADMIN — EPHEDRINE SULFATE 5 MG: 5 INJECTION INTRAVENOUS at 11:37

## 2024-08-13 RX ADMIN — PROPOFOL 150 MG: 10 INJECTION, EMULSION INTRAVENOUS at 10:19

## 2024-08-13 RX ADMIN — TAMSULOSIN HYDROCHLORIDE 0.4 MG: 0.4 CAPSULE ORAL at 08:34

## 2024-08-13 RX ADMIN — TRANEXAMIC ACID 1000 MG: 100 INJECTION, SOLUTION INTRAVENOUS at 11:35

## 2024-08-13 RX ADMIN — FENTANYL CITRATE 50 MCG: 50 INJECTION INTRAMUSCULAR; INTRAVENOUS at 10:16

## 2024-08-13 RX ADMIN — SENNOSIDES AND DOCUSATE SODIUM 1 TABLET: 50; 8.6 TABLET ORAL at 14:28

## 2024-08-13 RX ADMIN — Medication 100 MG: at 10:19

## 2024-08-13 RX ADMIN — PREGABALIN 75 MG: 75 CAPSULE ORAL at 08:34

## 2024-08-13 RX ADMIN — Medication 200 MCG: at 10:47

## 2024-08-13 RX ADMIN — TRAMADOL HYDROCHLORIDE 50 MG: 50 TABLET ORAL at 12:38

## 2024-08-13 RX ADMIN — Medication 100 MCG: at 10:37

## 2024-08-13 RX ADMIN — Medication 200 MCG: at 10:58

## 2024-08-13 RX ADMIN — ACETAMINOPHEN 1000 MG: 500 TABLET ORAL at 14:28

## 2024-08-13 RX ADMIN — Medication 200 MCG: at 10:43

## 2024-08-13 RX ADMIN — SODIUM CHLORIDE, SODIUM LACTATE, POTASSIUM CHLORIDE, AND CALCIUM CHLORIDE: 600; 310; 30; 20 INJECTION, SOLUTION INTRAVENOUS at 08:49

## 2024-08-13 RX ADMIN — FENTANYL CITRATE 100 MCG: 50 INJECTION INTRAMUSCULAR; INTRAVENOUS at 09:46

## 2024-08-13 RX ADMIN — FENTANYL CITRATE 50 MCG: 50 INJECTION INTRAMUSCULAR; INTRAVENOUS at 10:40

## 2024-08-13 ASSESSMENT — PAIN DESCRIPTION - PAIN TYPE
TYPE: SURGICAL PAIN

## 2024-08-13 ASSESSMENT — PAIN DESCRIPTION - DESCRIPTORS
DESCRIPTORS: ACHING
DESCRIPTORS: SORE
DESCRIPTORS: SORE;OTHER (COMMENT)
DESCRIPTORS: SORE;OTHER (COMMENT)
DESCRIPTORS: SORE

## 2024-08-13 ASSESSMENT — PAIN DESCRIPTION - LOCATION
LOCATION: HIP

## 2024-08-13 ASSESSMENT — PAIN SCALES - GENERAL
PAINLEVEL_OUTOF10: 4
PAINLEVEL_OUTOF10: 3
PAINLEVEL_OUTOF10: 0
PAINLEVEL_OUTOF10: 4
PAINLEVEL_OUTOF10: 5

## 2024-08-13 ASSESSMENT — PAIN - FUNCTIONAL ASSESSMENT
PAIN_FUNCTIONAL_ASSESSMENT: ACTIVITIES ARE NOT PREVENTED
PAIN_FUNCTIONAL_ASSESSMENT: 0-10

## 2024-08-13 ASSESSMENT — PAIN DESCRIPTION - FREQUENCY
FREQUENCY: INTERMITTENT
FREQUENCY: INTERMITTENT

## 2024-08-13 ASSESSMENT — PAIN DESCRIPTION - ORIENTATION
ORIENTATION: LEFT

## 2024-08-13 ASSESSMENT — PAIN DESCRIPTION - ONSET: ONSET: GRADUAL

## 2024-08-13 NOTE — ANESTHESIA PRE PROCEDURE
Social History     Tobacco Use   • Smoking status: Former     Current packs/day: 0.00     Average packs/day: 0.5 packs/day for 30.0 years (15.0 ttl pk-yrs)     Types: Cigarettes     Start date: 1973     Quit date: 2003     Years since quittin.6   • Smokeless tobacco: Never   Substance Use Topics   • Alcohol use: Not Currently     Comment: quit                                 Counseling given: Not Answered      Vital Signs (Current):   Vitals:    24 1021 24 0820   BP:  137/72   Pulse:  86   Resp:  20   Temp:  97.9 °F (36.6 °C)   TempSrc:  Oral   SpO2:  100%   Weight: 77.6 kg (171 lb) 77.7 kg (171 lb 3.2 oz)   Height: 1.626 m (5' 4\") 1.626 m (5' 4\")                                              BP Readings from Last 3 Encounters:   24 137/72   24 125/70   24 132/73       NPO Status: Time of last liquid consumption: 0500 (small sip of water with meds)                        Time of last solid consumption:                         Date of last liquid consumption: 24                        Date of last solid food consumption: 24    BMI:   Wt Readings from Last 3 Encounters:   24 77.7 kg (171 lb 3.2 oz)   24 77.6 kg (171 lb)   24 78.5 kg (173 lb)     Body mass index is 29.39 kg/m².    CBC:   Lab Results   Component Value Date/Time    WBC 9.5 2024 12:34 PM    RBC 4.81 2024 12:34 PM    HGB 14.5 2024 12:34 PM    HCT 46.1 2024 12:34 PM    MCV 95.8 2024 12:34 PM    RDW 13.8 2024 12:34 PM     2024 12:34 PM       CMP:   Lab Results   Component Value Date/Time     2024 12:34 PM    K 4.7 2024 12:34 PM     2024 12:34 PM    CO2 27 2024 12:34 PM    BUN 16 2024 12:34 PM    CREATININE 0.51 2024 12:34 PM    GFRAA >60 2021 11:17 AM    AGRATIO 0.9 2021 11:44 AM    LABGLOM >90 2024 12:34 PM    LABGLOM >60 2023 09:48 AM    GLUCOSE 109

## 2024-08-13 NOTE — BRIEF OP NOTE
Brief Postoperative Note      Patient: Yesy Peng  YOB: 1955  MRN: [id number]    Date of Procedure: 8/13/2024    Pre-Op Diagnosis Codes:      * Osteoarthritis of left hip [M16.12] with dysplasia, likely avn and capsular contracture, FFD, osteomalacia    Post-Op Diagnosis: Same       Procedure(s):  Left total hip arthroplasty    Surgeon(s):  Josh Galvez MD    Assistant:  Surgical Assistant: Justin Padron  Physician Assistant: Kali Espinal PA-C    Anesthesia: General    Estimated Blood Loss (mL): 300     Complications: None    Specimens:   ID Type Source Tests Collected by Time Destination   A : LEFT FEMUR HEAD Tissue Tissue SURGICAL PATHOLOGY Josh Galvez MD 8/13/2024 1054        Implants:  Implant Name Type Inv. Item Serial No.  Lot No. LRB No. Used Action   SCREW BNE L25MM DIA6.5MM HEX LO PROF TRIDENT II - [id number]  SCREW BNE L25MM DIA6.5MM HEX LO PROF TRIDENT II  [employer] [facility] [id number] Left 1 Implanted   SCREW BNE L20MM [id number] LO PROF HEX TRIDENT LL - [id number]  SCREW BNE L20MM [id number] LO PROF HEX TRIDENT LL  [employer] [facility] [id number] Left 1 Implanted   HEX DOME HOLE PLUG - [id number]  HEX DOME HOLE PLUG  [employer] [facility] [id number] Left 1 Implanted   SHELL ACET SZ E [id number] 5 CLUS H TRITANIUM PRESSFIT KELLEY - [id number]  SHELL ACET SZ E [id number] 5 CLUS H TRITANIUM PRESSFIT KELLEY  [employer] [facility] [id number] Left 1 Implanted   INSERT ACET E 0 DEG 36 MM HIP X3 TRIDENT - [id number]  INSERT ACET E 0 DEG 36 MM HIP X3 TRIDENT  [employer] [facility] [id number] Left 1 Implanted   STEM FEM SZ 6 L111MM NK L35MM 45MM OFFSET 127DEG HIP TI - [id number]  STEM FEM SZ 6 L111MM NK L35MM 45MM OFFSET 127DEG HIP TI  [employer] [facility] [id number] Left 1 Implanted   HEAD FEM [id number] -5MM OFFSET HIP BIOLOX DELT CERAMIC TAPR - [id number]  HEAD FEM [id number] -5MM OFFSET HIP BIOLOX DELT CERAMIC TAPR  [employer] HOW-WD [id number] Left 1

## 2024-08-13 NOTE — CARE COORDINATION
KHAI faxed the patient clinicals to [facility] [phone].      Saji Yao, MSW     [phone]

## 2024-08-13 NOTE — ANESTHESIA POSTPROCEDURE EVALUATION
Department of Anesthesiology  Postprocedure Note    Patient: Yesy Peng  MRN: [id number]  YOB: 1955  Date of evaluation: 8/13/2024    Procedure Summary       Date: 08/13/24 Room / Location: [facility] MAIN 07 / [facility] MAIN OR    Anesthesia Start: 1014 Anesthesia Stop: 1201    Procedure: Left total hip arthroplasty (Left: Hip) Diagnosis:       Osteoarthritis of left hip      (Osteoarthritis of left hip [M16.12])    Surgeons: Josh Galvez MD Responsible Provider: James Roberts MD    Anesthesia Type: general, regional ASA Status: 2            Anesthesia Type: No value filed.    Primo Phase I: Primo Score: 10    Primo Phase II:      Anesthesia Post Evaluation    Patient location during evaluation: bedside  Patient participation: complete - patient participated  Level of consciousness: responsive to verbal stimuli  Airway patency: patent  Nausea & Vomiting: no nausea  Respiratory status: acceptable  Hydration status: euvolemic    No notable events documented.

## 2024-08-13 NOTE — ANESTHESIA PROCEDURE NOTES
Peripheral Block    Patient location during procedure: pre-op  Reason for block: post-op pain management and at surgeon's request  Start time: 8/13/2024 9:45 AM  End time: 8/13/2024 9:55 AM  Staffing  Performed: anesthesiologist   Anesthesiologist: James Roberts MD  Performed by: James Roberts MD  Authorized by: James Roberts MD    Preanesthetic Checklist  Completed: patient identified, IV checked, site marked, risks and benefits discussed, surgical/procedural consents, equipment checked, pre-op evaluation, timeout performed, anesthesia consent given, oxygen available, monitors applied/VS acknowledged, fire risk safety assessment completed and verbalized and blood product R/B/A discussed and consented  Peripheral Block   Patient position: supine  Prep: ChloraPrep  Provider prep: mask and sterile gloves  Patient monitoring: cardiac monitor, continuous pulse ox, continuous capnometry, frequent blood pressure checks, IV access, oxygen and responsive to questions  Block type: Fascia iliaca  Laterality: left  Injection technique: single-shot  Guidance: ultrasound guided  Local infiltration: lidocaine  Infiltration strength: 1 %  Local infiltration: lidocaine  Dose: 1 mL    Needle   Needle gauge: 21 G  Needle localization: ultrasound guidance  Needle length: 10 cm  Assessment   Injection assessment: negative aspiration for heme, low pressure verified by pressure monitor, no paresthesia on injection, local visualized surrounding nerve on ultrasound and no intravascular symptoms  Paresthesia pain: none  Slow fractionated injection: yes  Hemodynamics: stable  Outcomes: uncomplicated    Medications Administered  ropivacaine (NAROPIN) injection 0.2% - Perineural   30 mL - 8/13/2024 9:55:00 AM

## 2024-08-13 NOTE — PROGRESS NOTES
Occupational Therapy    OCCUPATIONAL THERAPY EVALUATION/DISCHARGE    Patient: Yesy Peng (69 y.o. female)  Date: 8/13/2024  Primary Diagnosis: Osteoarthritis of left hip [M16.12]  Arthritis of hip [M16.10]  Procedure(s) (LRB):  Left total hip arthroplasty (Left) Day of Surgery   Precautions: General Precautions, Surgical Protocols, Fall Risk, Weight Bearing,  , Left Lower Extremity Weight Bearing: Weight Bearing As Tolerated,  Hip Precautions: Posterior hip precautions  PLOF: Pt lives alone in a one level apartment, level entry, tub/shower w/ shower chair. Pt also has RW sock aide, shoe horn Pt was independent w/ ADLs and mobility.      ASSESSMENT AND RECOMMENDATIONS:  Pt sitting in recliner upon Ot's entry, sister present in room. Pt denies pain but reports nausea. Pt agreeable w/ encouragement. OT educated pt on LLE WBAT and posterior hip precautions; pt demonstrated good understanding throughout session. OT gives pt reacher and long handled sponge, as pt states she already has shoe horn and sock aid and does not want another. Pt completes LE dressing mod I w/ verbal cues, UE dressing independently. Sit>stand SBA. Pt begins to ambulate to toile using RW, SBA, but then reports nausea and ambulates back to chair, SBA. No LOB noted. Pt overall tolerates well despite nausea. No further OT needs at this time. Pt left seated in recliner, sister present in room, all needs in reach.    Maximum therapeutic gains met at current level of care and patient will be discharged from occupational therapy at this time.    Further Equipment Recommendations for Discharge: no further needs    [facility]: AM-PAC Inpatient Daily Activity Raw Score: 23    At this time and based on an AM-PAC score, no further OT is recommended upon discharge.  Recommend patient returns to prior setting with prior services.    This [facility] score should be considered in conjunction with interdisciplinary team recommendations to determine the most appropriate

## 2024-08-13 NOTE — OP NOTE
[address]  [address]                            OPERATIVE REPORT      PATIENT NAME: MIGEL BROUSSARD              : 1955  MED REC NO: [id number]                       ROOM: OR  ACCOUNT NO: [id number]                       ADMIT DATE: 2024  PROVIDER: Josh Galvez MD    DATE OF SERVICE:  2024    PREOPERATIVE DIAGNOSES:  End-stage of the left hip with suspected avascular necrosis, inflammatory arthritis, hip dysplasia, osteomalacia, adhesive capsulitis, fixed flexion deformity, and ankylosis.    POSTOPERATIVE DIAGNOSES:  End-stage of the left hip with suspected avascular necrosis, inflammatory arthritis, hip dysplasia, osteomalacia, adhesive capsulitis, fixed flexion deformity, and ankylosis.    PROCEDURES PERFORMED:       1. Left total hip replacement using the Pascual Accolade II system with a 52 mm Trident II acetabular shell with a neutral 36 liner, a size 6, 127 Accolade II high offset femoral component and a 36, -5 ceramic femoral head.     2. Release of adhesive capsulitis.     3. Correction of fixed flexion deformity.     4. Correction of ankylosis.     5. Rebalancing of hip.    SURGEON:  Josh Galvez MD    ASSISTANT:  JR Espinal, first assistant.    ANESTHESIA:  Nerve block with light general.    ILA Espinal was the first assistant, assisted with all phases of surgery commencing with the patient positioning, patient prep, patient drape, leg positioning during surgery, retracting, assisting with the surgery itself, closure, dressing placement, and transfer.    ESTIMATED BLOOD LOSS:  Just about 275 mL.    SPECIMENS REMOVED:  Femoral head.    INTRAOPERATIVE FINDINGS:  Same.     COMPLICATIONS:  None.    IMPLANTS:  As above-mentioned.    INDICATIONS:  Same.    DESCRIPTION OF PROCEDURE:  After anesthetic was induced, the leg lengths were determined.  She was found perhaps even slightly long on the affected extremity

## 2024-08-13 NOTE — NURSE NAVIGATOR
Rounded on patient s/p left total hip replacement with Dr. Galvez, dos 08/13/2024. Patient observed to be alert and oriented x 3, sitting up in bedside chair. She denies  chest pain, shortness of breath, nausea, or vomiting. She denies any residual numbness in her lower extremities, she is able to purposefully move both legs. She was able to safely ambulate to her bedside chair with the use of a rolling walker. She was provided with a lunch and a gingerale. She is rating her pain 5/10 at this time. Dressing to left hip observed to be clean, dry and intact with ice pack in place for comfort. Patient provided with total hip replacement education book, medication education sheet, medication schedule and asael hose. She was reminded that asael hose are for daytime wear only to assist with swelling and should be removed at night when she goes to bed. Reviewed the use of incentive spirometry. Patient was instructed to use ten times hourly while in hospital and to continue use at home for the next few days to keep lungs expanded and free from complications. Reviewed postoperative showering instructions. Patient was reminded that she may shower in two days, no tubs or submersion in water. She is to contact clinic with any dressing issues. Encouraged hourly ambulation , short distances every hour to assist with stiffness. Encouraged icing 20 minutes every hour, not to be placed directly on on her skin to assist with swelling. Patient verbalized understanding of all information provided. She would like to discharge home today. Her medications have been sent to the [facility] outpatient pharmacy for . She has all required DME at home. She will discharge home with home physical therapy once she has been cleared safe for discharge by PT/OT and has voided on her own. Will continue to follow until patient is discharged and then postoperatively.

## 2024-08-13 NOTE — PERIOP NOTE
Patient /Family /Designee has been informed that [facility] is not responsible for patient belongings per policy and the signed [facility] Patient Agreement document.  Personal items should be sent home or checked in with security.  Patient /Family /Designee selected the following action:                            [x]  Send personal items home with a family member or friend                                                 []  Check in personal items with security, excluding clothing                            []  Maintain personal items at the bedside, against recommendation                                 by Noah Deng [facility]

## 2024-08-13 NOTE — DISCHARGE INSTRUCTIONS
DISCHARGE ACTIVITY  ?   Walking is the most important therapy after a hip replacement. Gradually increase your walking daily. Start bywalking daily 5-10 minutes 3-4 times per day. The goal is to be walking 20-30 minutes 1-2 times per day by 6 weeks post-op. You may progress to a single crutch or cane as tolerated.  ?  Weight-bearing as tolerated with walker or crutches for a minimum of 2 weeks to prevent falls. (Discuss with your surgeon at 2 week post-op appointment) Slowly transition to a cane and then nothing when you feel comfortable and safe (everyone is different). This can take anywhere from a few weeks to a few months.  ?  No running or high impact activities.  ?  You may put full weight on your hip unless instructed otherwise.  ?  HIP REPLACEMENT PRECAUTIONS     Avoid extremes of motion.  No pivoting on operative leg for 6 weeks.     SLEEP     You may sleep on your back, stomach or either side with a pillow between your knees.  You may resume sexual relations according to your comfort and must avoid extremes of motion.     BLOOD CLOT PREVENTION     You will be on a medication to prevent blood clots for 4-6 weeks after surgery. Take as directed until all tablets are taken.  ?   Walking regularly every day helps decrease the risk of blood clots!     COMPRESSION STOCKINGS     Wear knee-high compression stockings during the day as needed until swelling resolves. Remove stockings at bedtime.     You may use an ace wrap instead of stockings to help with swelling. Apply the ace wrap from middle of calf to middle of thigh.     WOUND DRESSING INSTRUCTIONS     Please do not remove the bandage. Keep in place until follow-up appointment 10-14 days after surgery.        SHOWERING  ?   It is OK to shower ONLY if incision remains dry.  ?   You may shower with the outer dressing in place, immediately following surgery. Make sure the edges are sealed to avoid getting the wound wet. If becomes wet under bandage, remove and

## 2024-08-13 NOTE — TELEPHONE ENCOUNTER
Faith (Nurse Coordinator - [facility]) called to request the Home Health Protocol.          Ph: [phone]  Fax: [phone]

## 2024-08-13 NOTE — INTERVAL H&P NOTE
Update History & Physical    The patient's History and Physical of August 13, 2024 was reviewed with the patient and I examined the patient. There was no change. The surgical site was confirmed by the patient and me.     Plan: The risks, benefits, expected outcome, and alternative to the recommended procedure have been discussed with the patient. Patient understands and wants to proceed with the procedure.     Electronically signed by ANIL ZAMORANO MD on 8/13/2024 at 9:32 AM

## 2024-08-13 NOTE — DISCHARGE SUMMARY
CAPS Take by mouth daily      Multiple Vitamins-Minerals (THERAPEUTIC MULTIVITAMIN-MINERALS) tablet Take 1 tablet by mouth daily      Abatacept (ORENCIA CLICKJECT) 125 MG/ML SOAJ weekly      dorzolamide-timolol (COSOPT) 22.3-6.8 MG/ML ophthalmic solution 1 drop into affected eye      latanoprost (XALATAN) 0.005 % ophthalmic solution Apply 1 drop to eye      lisinopril (PRINIVIL;ZESTRIL) 20 MG tablet Take 1 tablet by mouth daily  Qty: 90 tablet, Refills: 1    Associated Diagnoses: Essential (primary) hypertension      amoxicillin (AMOXIL) 500 MG capsule 4 po one hour prior to appt  Qty: 4 capsule, Refills: 3             Discharge Plan:  The patient will be d/c'd to home, total hip protocol, WBAT.  They will have HH PT and nursing.  Total joint protocol.  Pt safe for homebound transfer, after being cleared by PT, sp Total joint replacement.  A walker, bedside commode, and shower chair will be utilized for ADL's.  Follow up with Dr. Galvez in 14 days.  Call with any questions or concerns.

## 2024-08-13 NOTE — PLAN OF CARE
Problem: Physical Therapy - Adult  Goal: By Discharge: Performs mobility at highest level of function for planned discharge setting.  See evaluation for individualized goals.  Description: Physical Therapy Goals:  Initiated 8/13/2024 to be met within 7-10 days.    1.  Patient will move from supine to sit and sit to supine , scoot up and down, and roll side to side in bed with supervision/set-up while maintaining surgical precautions in order to safely get into/out of bed.    2.  Patient will transfer from bed to chair and chair to bed with supervision/set-up using RW while maintaining surgical precautions in order to safely partake in OOB mobility.  3.  Patient will perform sit to stand with supervision/set-up while maintaining surgical precautions in order to safely partake in OOB mobility.  4.  Patient will ambulate with supervision/set-up for 50 feet using RW in order to safely navigate household distances.       PLOF: lives alone in 1 level apartment with 0 MANNY, independent at baseline using SPC, sister plans to stay with patient at discharge to assist as needed    Outcome: Progressing     PHYSICAL THERAPY EVALUATION    Patient: Yesy Peng (69 y.o. female)  Date: 8/13/2024  Primary Diagnosis: Osteoarthritis of left hip [M16.12]  Arthritis of hip [M16.10]  Procedure(s) (LRB):  Left total hip arthroplasty (Left) Day of Surgery   Precautions: Surgical Protocols, General Precautions, Weight Bearing, Fall Risk,  , Left Lower Extremity Weight Bearing: Weight Bearing As Tolerated,  ,  ,  ,  , Hip Precautions: Posterior hip precautions    ASSESSMENT :  Patient seen for PT evaluation and treatment. Received seated; agreeable. Presenting with deficits as listed below, impacting patient's ability to safely and independently mobilize. Prior to mobilization, surgical precautions reviewed, with patient demonstrating good verbal understanding and functional compliance. Able to complete transfers with SBA and ambulation

## 2024-08-14 ENCOUNTER — TELEPHONE (OUTPATIENT)
Age: 69
End: 2024-08-14

## 2024-08-14 NOTE — TELEPHONE ENCOUNTER
Patient had a hip replacement 8/13 and there is more drainage than normal. Patient's sister stated that the bandage is covered with fluids. Please contact patient's sister Marianna Flores @ [phone]

## 2024-08-14 NOTE — TELEPHONE ENCOUNTER
Have HH change dressing  Hold blood thinner- aspirin  Ice to hip    Come in for appt if not improved.

## 2024-08-14 NOTE — TELEPHONE ENCOUNTER
08/14/2024  Spoke with Pt sister Marianna Flores in regard to her sister was instructed to ice hip, hold blood thinners and have HH change dressing if not improved to [facility] for an appt.

## 2024-08-14 NOTE — TELEPHONE ENCOUNTER
Attempted to call patients sister. Message left for her to contact the office.  Please give recommendations to patient sister.

## 2024-08-15 ENCOUNTER — TELEPHONE (OUTPATIENT)
Facility: HOSPITAL | Age: 69
End: 2024-08-15

## 2024-08-15 NOTE — TELEPHONE ENCOUNTER
Call placed to patient, ID verified x 2. Patient is s/p left total hip replacement with Dr. Galvez, dos 08/13/2024. She denies chest pain, shortness of breath, nausea, vomiting, fever or chills. She denies any residual numbness in her left lower extremity, she denies any difficulty with bladder, she is passing gas. She states that the pain was the worst last night but right now she is just suffering with stiffness. She is up ambulating hourly with her walker, she is icing to assist with pain and swelling. She states that her dressing became saturated so she called the office. She states that JR was able to get her in and change it for her. She states that since then her dressing has remained clean, dry and intact. She reports than home physical therapy will be out to work with her tomorrow. She is concerned that her dressing is now not waterproof. Offered to allow patients sister to come to coordinators office to  a dressing so that it can be changed tomorrow by PT when they come out. Patient verbalized she would try to do this and thanked coordinator for the offer. Overall she feels she is doing very well. She has no questions or concerns at this time. She will follow up with Dr. Galvez in two weeks or sooner if needed.

## 2024-08-30 ENCOUNTER — OFFICE VISIT (OUTPATIENT)
Age: 69
End: 2024-08-30

## 2024-08-30 ENCOUNTER — HOSPITAL ENCOUNTER (OUTPATIENT)
Facility: HOSPITAL | Age: 69
End: 2024-08-30
Payer: MEDICARE

## 2024-08-30 DIAGNOSIS — Z96.642 HISTORY OF TOTAL LEFT HIP REPLACEMENT: Primary | ICD-10-CM

## 2024-08-30 DIAGNOSIS — Z96.642 HISTORY OF TOTAL LEFT HIP REPLACEMENT: ICD-10-CM

## 2024-08-30 PROCEDURE — 99024 POSTOP FOLLOW-UP VISIT: CPT | Performed by: PHYSICIAN ASSISTANT

## 2024-08-30 PROCEDURE — 93971 EXTREMITY STUDY: CPT

## 2024-08-30 NOTE — PATIENT INSTRUCTIONS
If we order a Diagnostic test (such as MRI or CT) during your office visit please see below:     Coordination of Care will be calling you to schedule your diagnostic test. If you have not heard from Coordination of Care within 3 business days, please call or text [phone].     Once you have a date scheduled for your diagnostic test, you will need to contact our office to schedule a follow up appointment, as this is when the physician will review your diagnostic test results with you. You can contact our office to schedule appointment by phone at [phone], or you can send a message via [employer] to request an appointment.    Stat doppler ordered today, 8/30/2024

## 2024-08-30 NOTE — PROGRESS NOTES
Patient: Yesy Peng                MRN: [id number]       SSN: [id number]  YOB: 1955        AGE: 69 y.o.        SEX: female  There is no height or weight on file to calculate BMI.    PCP: Anne-Marie Cortez MD  08/30/24      This office note has been dictated.      REVIEW OF SYSTEMS:  Constitutional: Negative for fever, chills, weight loss and malaise/fatigue.   HENT: Negative.    Eyes: Negative.    Respiratory: Negative.   Cardiovascular: Negative.   Gastrointestinal: No bowel incontinence or constipation.  Genitourinary: No bladder incontinence or saddle anesthesia.  Skin: Negative.   Neurological: Negative.    Endo/Heme/Allergies: Negative.    Psychiatric/Behavioral: Negative.  Musculoskeletal: As per HPI above.     Past Medical History:   Diagnosis Date    Ankle fracture 09/2020    no cast or sx    Arthritis     Rheumatoid arthritis    Ganglion 10/2019    Glaucoma     Hypertension     Osteoporosis     Rheumatoid arthritis (HCC)     Dr. asencio.  Cimzia infusions monthly         Current Outpatient Medications:     aspirin (ASPIRIN 81) 81 MG EC tablet, Take 1 tablet by mouth in the morning and at bedtime, Disp: 60 tablet, Rfl: 3    docusate sodium (COLACE) 100 MG capsule, Take 1 capsule by mouth 2 times daily, Disp: 60 capsule, Rfl: 1    naloxone (NARCAN) 4 MG/0.1ML LIQD nasal spray, 1 spray by Nasal route as needed for Opioid Reversal, Disp: 1 each, Rfl: 1    acetaminophen (TYLENOL) 500 MG tablet, Take 2 tablets by mouth every 8 hours as needed for Pain, Disp: 90 tablet, Rfl: 1    ondansetron (ZOFRAN) 4 MG tablet, Take 1 tablet by mouth every 8 hours as needed for Nausea or Vomiting, Disp: 30 tablet, Rfl: 2    lisinopril (PRINIVIL;ZESTRIL) 20 MG tablet, Take 1 tablet by mouth daily (Patient not taking: Reported on 8/6/2024), Disp: 90 tablet, Rfl: 1    lisinopril-hydroCHLOROthiazide (PRINZIDE;ZESTORETIC) 20-12.5 MG per tablet, Take 1 tablet by mouth daily, Disp: 90 tablet, Rfl: 3     true   Transportation Needs: No Transportation Needs (8/13/2024)    PRAPARE - Transportation     Lack of Transportation (Medical): No     Lack of Transportation (Non-Medical): No   Physical Activity: Sufficiently Active (10/10/2023)    Exercise Vital Sign     Days of Exercise per Week: 7 days     Minutes of Exercise per Session: 50 min   Stress: Not on file   Social Connections: Patient Declined (8/30/2024)    Social Connections ([facility] HRSN)     If for any reason you need help with day-to-day activities such as bathing, preparing meals, shopping, managing finances, etc., do you get the help you need?: Not on file   Intimate Partner Violence: Not on file   Housing Stability: Low Risk  (8/13/2024)    Housing Stability Vital Sign     Unable to Pay for Housing in the Last Year: No     Number of Times Moved in the Last Year: 1     Homeless in the Last Year: No       Past Surgical History:   Procedure Laterality Date    CARPAL TUNNEL RELEASE Left 2017    CARPAL TUNNEL RELEASE Right 2016    CHOLECYSTECTOMY  1993    TOTAL HIP ARTHROPLASTY Right 6/5/2023    RIGHT TOTAL HIP ARTHROPLASTY LATERAL performed by Josh Galvez MD at [facility] OR    TOTAL HIP ARTHROPLASTY Left 8/13/2024    Left total hip arthroplasty performed by Josh Galvez MD at [facility] MAIN OR         Patient seen and evaluated today for her left hip. She is now 17 days status post left total hip replacement and she is doing well. She is quite pleased with the results. She complains of left leg edema.  She is taking tylenol PRN. She is wearing a compression sock. No recent falls or injuries. She also complains of a rash above the wound.     Patient denies recent fevers, chills, chest pain, SOB, or injuries.   No recent systemic changes noted.  A 12-point review of systems is performed today.  Pertinent positives are noted.  All other systems reviewed and otherwise are negative.    Physical exam: General: Alert and oriented x3, nad.  well-developed, well

## 2024-08-31 PROCEDURE — 93971 EXTREMITY STUDY: CPT | Performed by: SURGERY

## 2024-09-03 ENCOUNTER — TELEPHONE (OUTPATIENT)
Age: 69
End: 2024-09-03

## 2024-09-03 NOTE — TELEPHONE ENCOUNTER
Patient called to inform us she had an allergic reaction to the bandage placed at her last visit. States she has tried the OTC cortisone cream the was suggested and it is not working. She is asking for a call back to discuss if possible.    Tel. [phone]

## 2024-09-04 NOTE — TELEPHONE ENCOUNTER
Patient call is returned. She is advised to change bandage,bandage using paper tape, and Benadryl.   Patient states she is no longer covering wound with a bandage. She states she is using OTC hydrocortisone cream 15, she stages she still has irritation and itching. She is questioning is there is a stronger OTC cream? She states she doesn't want to take Benadryl while taking other post op medication.. Please advise

## 2024-09-04 NOTE — TELEPHONE ENCOUNTER
Patient advised per JR Teddy Moreau to take benadryl     Please cover the wound with a bandage.  Continue hydorcortisone cream.

## 2024-09-10 ENCOUNTER — HOSPITAL ENCOUNTER (OUTPATIENT)
Facility: HOSPITAL | Age: 69
Setting detail: RECURRING SERIES
Discharge: HOME | End: 2024-09-13
Payer: MEDICARE

## 2024-09-10 PROCEDURE — 97535 SELF CARE MNGMENT TRAINING: CPT

## 2024-09-10 PROCEDURE — 97530 THERAPEUTIC ACTIVITIES: CPT

## 2024-09-10 PROCEDURE — 97162 PT EVAL MOD COMPLEX 30 MIN: CPT

## 2024-09-13 ENCOUNTER — HOSPITAL ENCOUNTER (OUTPATIENT)
Facility: HOSPITAL | Age: 69
Setting detail: RECURRING SERIES
Discharge: HOME | End: 2024-09-16
Payer: MEDICARE

## 2024-09-13 PROCEDURE — 97110 THERAPEUTIC EXERCISES: CPT

## 2024-09-13 PROCEDURE — 97530 THERAPEUTIC ACTIVITIES: CPT

## 2024-09-17 ENCOUNTER — HOSPITAL ENCOUNTER (OUTPATIENT)
Facility: HOSPITAL | Age: 69
Setting detail: RECURRING SERIES
Discharge: HOME | End: 2024-09-20
Payer: MEDICARE

## 2024-09-17 PROCEDURE — 97110 THERAPEUTIC EXERCISES: CPT

## 2024-09-17 PROCEDURE — 97140 MANUAL THERAPY 1/> REGIONS: CPT

## 2024-09-17 PROCEDURE — 97530 THERAPEUTIC ACTIVITIES: CPT

## 2024-09-18 ENCOUNTER — OFFICE VISIT (OUTPATIENT)
Age: 69
End: 2024-09-18
Payer: MEDICARE

## 2024-09-18 VITALS — WEIGHT: 173.4 LBS | BODY MASS INDEX: 29.6 KG/M2 | HEIGHT: 64 IN

## 2024-09-18 DIAGNOSIS — Z96.642 HISTORY OF TOTAL LEFT HIP REPLACEMENT: Primary | ICD-10-CM

## 2024-09-18 PROCEDURE — 73502 X-RAY EXAM HIP UNI 2-3 VIEWS: CPT | Performed by: PHYSICIAN ASSISTANT

## 2024-09-18 PROCEDURE — 99024 POSTOP FOLLOW-UP VISIT: CPT | Performed by: PHYSICIAN ASSISTANT

## 2024-09-19 ENCOUNTER — HOSPITAL ENCOUNTER (OUTPATIENT)
Facility: HOSPITAL | Age: 69
Setting detail: RECURRING SERIES
Discharge: HOME | End: 2024-09-22
Payer: MEDICARE

## 2024-09-19 PROCEDURE — 97530 THERAPEUTIC ACTIVITIES: CPT

## 2024-09-19 PROCEDURE — 97110 THERAPEUTIC EXERCISES: CPT

## 2024-09-20 ENCOUNTER — HOSPITAL ENCOUNTER (OUTPATIENT)
Facility: HOSPITAL | Age: 69
Setting detail: SPECIMEN
Discharge: HOME | End: 2024-09-23
Payer: MEDICARE

## 2024-09-20 ENCOUNTER — TELEPHONE (OUTPATIENT)
Facility: CLINIC | Age: 69
End: 2024-09-20

## 2024-09-20 ENCOUNTER — OFFICE VISIT (OUTPATIENT)
Facility: CLINIC | Age: 69
End: 2024-09-20

## 2024-09-20 VITALS
TEMPERATURE: 98.4 F | OXYGEN SATURATION: 98 % | BODY MASS INDEX: 29.53 KG/M2 | WEIGHT: 173 LBS | RESPIRATION RATE: 18 BRPM | HEIGHT: 64 IN | SYSTOLIC BLOOD PRESSURE: 117 MMHG | DIASTOLIC BLOOD PRESSURE: 70 MMHG | HEART RATE: 83 BPM

## 2024-09-20 DIAGNOSIS — I10 ESSENTIAL (PRIMARY) HYPERTENSION: ICD-10-CM

## 2024-09-20 DIAGNOSIS — E83.52 HYPERCALCEMIA: ICD-10-CM

## 2024-09-20 LAB
ANION GAP SERPL CALC-SCNC: 6 MMOL/L (ref 3–18)
BUN SERPL-MCNC: 9 MG/DL (ref 7–18)
BUN/CREAT SERPL: 16 (ref 12–20)
CALCIUM SERPL-MCNC: 10.1 MG/DL (ref 8.5–10.1)
CHLORIDE SERPL-SCNC: 107 MMOL/L (ref 100–111)
CO2 SERPL-SCNC: 25 MMOL/L (ref 21–32)
CREAT SERPL-MCNC: 0.58 MG/DL (ref 0.6–1.3)
GLUCOSE SERPL-MCNC: 107 MG/DL (ref 74–99)
POTASSIUM SERPL-SCNC: 4.7 MMOL/L (ref 3.5–5.5)
SODIUM SERPL-SCNC: 138 MMOL/L (ref 136–145)

## 2024-09-20 PROCEDURE — 36415 COLL VENOUS BLD VENIPUNCTURE: CPT

## 2024-09-20 PROCEDURE — 80048 BASIC METABOLIC PNL TOTAL CA: CPT

## 2024-09-20 RX ORDER — AMOXICILLIN 500 MG/1
CAPSULE ORAL
Qty: 4 CAPSULE | Refills: 3 | COMMUNITY
Start: 2024-09-20 | End: 2024-09-20

## 2024-09-20 RX ORDER — AMOXICILLIN 500 MG/1
500 CAPSULE ORAL 3 TIMES DAILY
COMMUNITY

## 2024-09-20 RX ORDER — LISINOPRIL 20 MG/1
20 TABLET ORAL DAILY
COMMUNITY

## 2024-09-20 ASSESSMENT — PATIENT HEALTH QUESTIONNAIRE - PHQ9
SUM OF ALL RESPONSES TO PHQ QUESTIONS 1-9: 0
SUM OF ALL RESPONSES TO PHQ9 QUESTIONS 1 & 2: 0
SUM OF ALL RESPONSES TO PHQ QUESTIONS 1-9: 0
1. LITTLE INTEREST OR PLEASURE IN DOING THINGS: NOT AT ALL
2. FEELING DOWN, DEPRESSED OR HOPELESS: NOT AT ALL

## 2024-09-20 ASSESSMENT — ENCOUNTER SYMPTOMS: BLOOD IN STOOL: 0

## 2024-09-23 ENCOUNTER — TELEPHONE (OUTPATIENT)
Facility: HOSPITAL | Age: 69
End: 2024-09-23

## 2024-09-23 ENCOUNTER — APPOINTMENT (OUTPATIENT)
Facility: HOSPITAL | Age: 69
End: 2024-09-23
Payer: MEDICARE

## 2024-09-25 ENCOUNTER — TELEPHONE (OUTPATIENT)
Facility: HOSPITAL | Age: 69
End: 2024-09-25

## 2024-09-25 ENCOUNTER — APPOINTMENT (OUTPATIENT)
Facility: HOSPITAL | Age: 69
End: 2024-09-25
Payer: MEDICARE

## 2024-09-27 ENCOUNTER — HOSPITAL ENCOUNTER (OUTPATIENT)
Facility: HOSPITAL | Age: 69
Setting detail: RECURRING SERIES
Discharge: HOME | End: 2024-09-30
Payer: MEDICARE

## 2024-09-27 PROCEDURE — 97112 NEUROMUSCULAR REEDUCATION: CPT

## 2024-09-27 PROCEDURE — 97110 THERAPEUTIC EXERCISES: CPT

## 2024-09-27 PROCEDURE — 97530 THERAPEUTIC ACTIVITIES: CPT

## 2024-09-27 NOTE — PROGRESS NOTES
PHYSICAL / OCCUPATIONAL THERAPY - DAILY TREATMENT NOTE    Patient Name: Yesy Peng    Date: 2024    : 1955  Insurance: Payor: MEDICARE / Plan: MEDICARE PART A AND B / Product Type: *No Product type* /      Patient  verified Yes     Visit #   Current / Total 5 16   Time   In / Out 253 pm 332 pm   Pain   In / Out 0/10  0/10    Subjective Functional Status/Changes: Patient denies any pain today, just stiffness.      TREATMENT AREA =  Left hip pain [M25.552]     OBJECTIVE         Therapeutic Procedures:    Tx Min Billable or 1:1 Min (if diff from Tx Min) Procedure, Rationale, Specifics   24  37638 Therapeutic Exercise (timed):  increase ROM, strength, coordination, balance, and proprioception to improve patient's ability to progress to PLOF and address remaining functional goals. (see flow sheet as applicable)     Details if applicable:       15  02217 Therapeutic Activity (timed):  use of dynamic activities replicating functional movements to increase ROM, strength, coordination, balance, and proprioception in order to improve patient's ability to progress to PLOF and address remaining functional goals.  (see flow sheet as applicable)     Details if applicable:           Details if applicable:            Details if applicable:            Details if applicable:     39   BC Totals Reminder: bill using total billable min of TIMED therapeutic procedures (example: do not include dry needle or estim unattended, both untimed codes, in totals to left)  8-22 min = 1 unit; 23-37 min = 2 units; 38-52 min = 3 units; 53-67 min = 4 units; 68-82 min = 5 units   Total Total     [x]  Patient Education billed concurrently with other procedures   [x] Review HEP    [] Progressed/Changed HEP, detail:    [] Other detail:       Objective Information/Functional Measures/Assessment    Patient presents to today's session with no reports of left hip pain  Added peach TB to lateral stepping  Progressed reps of 3 way hip and

## 2024-09-30 ENCOUNTER — HOSPITAL ENCOUNTER (OUTPATIENT)
Facility: HOSPITAL | Age: 69
Setting detail: RECURRING SERIES
Discharge: HOME | End: 2024-10-03
Payer: MEDICARE

## 2024-09-30 PROCEDURE — 97110 THERAPEUTIC EXERCISES: CPT

## 2024-09-30 PROCEDURE — 97530 THERAPEUTIC ACTIVITIES: CPT

## 2024-09-30 NOTE — PROGRESS NOTES
PHYSICAL / OCCUPATIONAL THERAPY - DAILY TREATMENT NOTE    Patient Name: Yesy Peng    Date: 2024    : 1955  Insurance: Payor: MEDICARE / Plan: MEDICARE PART A AND B / Product Type: *No Product type* /      Patient  verified Yes     Visit #   Current / Total 6 16   Time   In / Out 1133 am  1215 pm    Pain   In / Out 0/10 0/10    Subjective Functional Status/Changes: Patient reports that her feels good today.      TREATMENT AREA =  Left hip pain [M25.552]     OBJECTIVE         Therapeutic Procedures:    Tx Min Billable or 1:1 Min (if diff from Tx Min) Procedure, Rationale, Specifics   24  02763 Therapeutic Exercise (timed):  increase ROM, strength, coordination, balance, and proprioception to improve patient's ability to progress to PLOF and address remaining functional goals. (see flow sheet as applicable)     Details if applicable:       18  02917 Therapeutic Activity (timed):  use of dynamic activities replicating functional movements to increase ROM, strength, coordination, balance, and proprioception in order to improve patient's ability to progress to PLOF and address remaining functional goals.  (see flow sheet as applicable)     Details if applicable:           Details if applicable:            Details if applicable:            Details if applicable:     42   BC Totals Reminder: bill using total billable min of TIMED therapeutic procedures (example: do not include dry needle or estim unattended, both untimed codes, in totals to left)  8-22 min = 1 unit; 23-37 min = 2 units; 38-52 min = 3 units; 53-67 min = 4 units; 68-82 min = 5 units   Total Total     [x]  Patient Education billed concurrently with other procedures   [x] Review HEP    [] Progressed/Changed HEP, detail:    [] Other detail:       Objective Information/Functional Measures/Assessment    Patient presents to today's session with no reports of left hip pain. Exercises focused on improving left LE strength and stability for

## 2024-10-02 ENCOUNTER — HOSPITAL ENCOUNTER (OUTPATIENT)
Facility: HOSPITAL | Age: 69
Setting detail: RECURRING SERIES
Discharge: HOME | End: 2024-10-05
Payer: MEDICARE

## 2024-10-02 PROCEDURE — 97530 THERAPEUTIC ACTIVITIES: CPT

## 2024-10-02 PROCEDURE — 97110 THERAPEUTIC EXERCISES: CPT

## 2024-10-02 NOTE — PROGRESS NOTES
PHYSICAL / OCCUPATIONAL THERAPY - DAILY TREATMENT NOTE    Patient Name: Yesy Peng    Date: 10/2/2024    : 1955  Insurance: Payor: MEDICARE / Plan: MEDICARE PART A AND B / Product Type: *No Product type* /      Patient  verified Yes     Visit #   Current / Total 7 16   Time   In / Out 1130 am  1225 pm    Pain   In / Out 0/10  0/10   Subjective Functional Status/Changes: Patient denies any pain or stiffness today.      TREATMENT AREA =  Left hip pain [M25.552]     OBJECTIVE    Modalities Rationale:     decrease inflammation and decrease pain to improve patient's ability to progress to PLOF and address remaining functional goals.     min [] Estim Unattended, type/location:                                      []  w/ice    []  w/heat    min [] Estim Attended, type/location:                                     []  w/US     []  w/ice    []  w/heat    []  TENS insruct      min []  Mechanical Traction: type/lbs                   []  pro   []  sup   []  int   []  cont    []  before manual    []  after manual    min []  Ultrasound, settings/location:     10 min  unbill [x]  Ice     []  Heat    location/position: Supine to left hip with leg elevator    min []  Paraffin,  details:     min []  Vasopneumatic Device, press/temp:     min []  Whirlpool / Fluido:    If using vaso (only need to measure limb vaso being performed on)      pre-treatment girth :       post-treatment girth :       measured at (landmark location) :      min []  Other:    Skin assessment post-treatment:   Intact      Therapeutic Procedures:    Tx Min Billable or 1:1 Min (if diff from Tx Min) Procedure, Rationale, Specifics   25  62693 Therapeutic Exercise (timed):  increase ROM, strength, coordination, balance, and proprioception to improve patient's ability to progress to PLOF and address remaining functional goals. (see flow sheet as applicable)     Details if applicable:       20  55797 Therapeutic Activity (timed):  use of dynamic

## 2024-10-07 ENCOUNTER — TELEPHONE (OUTPATIENT)
Facility: HOSPITAL | Age: 69
End: 2024-10-07

## 2024-10-07 ENCOUNTER — HOSPITAL ENCOUNTER (OUTPATIENT)
Facility: HOSPITAL | Age: 69
Setting detail: RECURRING SERIES
End: 2024-10-07
Payer: MEDICARE

## 2024-10-09 ENCOUNTER — HOSPITAL ENCOUNTER (OUTPATIENT)
Facility: HOSPITAL | Age: 69
Setting detail: RECURRING SERIES
Discharge: HOME | End: 2024-10-12
Payer: MEDICARE

## 2024-10-09 PROCEDURE — 97110 THERAPEUTIC EXERCISES: CPT

## 2024-10-09 PROCEDURE — 97140 MANUAL THERAPY 1/> REGIONS: CPT

## 2024-10-09 PROCEDURE — 97530 THERAPEUTIC ACTIVITIES: CPT

## 2024-10-09 NOTE — PROGRESS NOTES
PHYSICAL / OCCUPATIONAL THERAPY - DAILY TREATMENT NOTE    Patient Name: Yesy Peng    Date: 10/9/2024    : 1955  Insurance: Payor: MEDICARE / Plan: MEDICARE PART A AND B / Product Type: *No Product type* /      Patient  verified Yes     Visit #   Current / Total 8 16   Time   In / Out 1130 am  1214 pm    Pain   In / Out 0/10  0/10    Subjective Functional Status/Changes: Patient denies any hip pain today. Patient explains that her low back is bothering her.      TREATMENT AREA =  Left hip pain [M25.552]     OBJECTIVE    Therapeutic Procedures:    Tx Min Billable or 1:1 Min (if diff from Tx Min) Procedure, Rationale, Specifics   12  53413 Therapeutic Exercise (timed):  increase ROM, strength, coordination, balance, and proprioception to improve patient's ability to progress to PLOF and address remaining functional goals. (see flow sheet as applicable)     Details if applicable:         37455 Therapeutic Activity (timed):  use of dynamic activities replicating functional movements to increase ROM, strength, coordination, balance, and proprioception in order to improve patient's ability to progress to PLOF and address remaining functional goals.  (see flow sheet as applicable)     Details if applicable:     10  24822 Manual Therapy (timed):  decrease pain, increase ROM, increase tissue extensibility, decrease trigger points, and increase postural awareness to improve patient's ability to progress to PLOF and address remaining functional goals.  The manual therapy interventions were performed at a separate and distinct time from the therapeutic activities interventions . (see flow sheet as applicable)      Details if applicable:  Shot gun mobs/MET; DTM and stretching (B) piriformis and glute med           Details if applicable:            Details if applicable:     44  MC BC Totals Reminder: bill using total billable min of TIMED therapeutic procedures (example: do not include dry needle or estim

## 2024-10-09 NOTE — THERAPY RECERTIFICATION
LATOYA HERNÁNDEZ [facility]  [address], [address], [place] [address]  Ph:[phone] Fx:[phone]  PHYSICAL THERAPY PROGRESS NOTE  Patient Name: Yesy Peng : 1955   Treatment/Medical Diagnosis: Left hip pain [M25.552]   Referral Source: Kali Espinal PA-C     Date of Initial Visit: 9/10/2023 Attended Visits: 8 Missed Visits: 3     SUMMARY OF TREATMENT  PT intervention has consisted of therapeutic exercise, functional activities, manual therapy, neuromuscular re-education and HEP to improve left hip strength, ROM and stability to improve patient's ability to perform functional and recreational activities with ease. CP utilized for pain management.      CURRENT STATUS    Patient has attended 8 total PT sessions and is making good progress towards PT goals. Patient presents no AD and minimal gait deviations. Patient reports improvements with left hip strength, decreased pain, ability to perform ADL's, ambulate and sleep throughout the night. Patient continues to report increased difficulty and left hip discomfort with stair negotiation, tasks that involve squatting down, lifting weighted objects and would like to return back to bowling. Patient reports 80% improvement with PT and will benefit from additional PT to further address remaining deficits and return patient to OF.        Short Term Goals: To be accomplished in  3-4weeks:  1. Independent with HEP.  EVAL: N/A  CURRENT: Patient reports compliance with HEP. MET      2. Decrease max pain to <2/10 to assist with being able to tolerate standing long enough to cook ad meal and walking around her house without a SPC.   EVAL: 3/10 pain and limited standing and walking tolerance.   CURRENT: Patient reports max left hip pain to be 0/10. MET      3. Pt is able to performed stairs with reciprocal gait pattern and use of 1 railing.   EVAL: step to gait pattern on stairs with 2 railings.  CURRENT- Patient was

## 2024-10-16 ENCOUNTER — HOSPITAL ENCOUNTER (OUTPATIENT)
Facility: HOSPITAL | Age: 69
Setting detail: RECURRING SERIES
Discharge: HOME | End: 2024-10-19
Payer: MEDICARE

## 2024-10-16 PROCEDURE — 97140 MANUAL THERAPY 1/> REGIONS: CPT

## 2024-10-16 PROCEDURE — 97530 THERAPEUTIC ACTIVITIES: CPT

## 2024-10-16 PROCEDURE — 97110 THERAPEUTIC EXERCISES: CPT

## 2024-10-16 NOTE — PROGRESS NOTES
PHYSICAL / OCCUPATIONAL THERAPY - DAILY TREATMENT NOTE    Patient Name: Yesy Peng    Date: 10/16/2024    : 1955  Insurance: Payor: MEDICARE / Plan: MEDICARE PART A AND B / Product Type: *No Product type* /      Patient  verified Yes     Visit #   Current / Total 9 16   Time   In / Out 1256 pm  135 pm    Pain   In / Out 0/10  0/10    Subjective Functional Status/Changes: Patient reports no pain today.      TREATMENT AREA =  Left hip pain [M25.552]     OBJECTIVE         Therapeutic Procedures:    Tx Min Billable or 1:1 Min (if diff from Tx Min) Procedure, Rationale, Specifics   15  16864 Therapeutic Exercise (timed):  increase ROM, strength, coordination, balance, and proprioception to improve patient's ability to progress to PLOF and address remaining functional goals. (see flow sheet as applicable)     Details if applicable:       16  17397 Therapeutic Activity (timed):  use of dynamic activities replicating functional movements to increase ROM, strength, coordination, balance, and proprioception in order to improve patient's ability to progress to PLOF and address remaining functional goals.  (see flow sheet as applicable)     Details if applicable:     8  70621 Manual Therapy (timed):  decrease pain, increase ROM, increase tissue extensibility, decrease trigger points, and increase postural awareness to improve patient's ability to progress to PLOF and address remaining functional goals.  The manual therapy interventions were performed at a separate and distinct time from the therapeutic activities interventions . (see flow sheet as applicable)     Details if applicable:  Shot gun mobs/MET; DTM and stretching (B) piriformis and glute med           Details if applicable:            Details if applicable:     39   BC Totals Reminder: bill using total billable min of TIMED therapeutic procedures (example: do not include dry needle or estim unattended, both untimed codes, in totals to left)  8

## 2024-10-18 ENCOUNTER — OFFICE VISIT (OUTPATIENT)
Age: 69
End: 2024-10-18

## 2024-10-18 VITALS — HEIGHT: 64 IN | BODY MASS INDEX: 30.35 KG/M2 | WEIGHT: 177.8 LBS

## 2024-10-18 DIAGNOSIS — Z96.642 HISTORY OF TOTAL LEFT HIP REPLACEMENT: Primary | ICD-10-CM

## 2024-10-18 PROCEDURE — 99024 POSTOP FOLLOW-UP VISIT: CPT | Performed by: PHYSICIAN ASSISTANT

## 2024-10-18 NOTE — PROGRESS NOTES
Patient: Yesy Peng                MRN: [id number]       SSN: [id number]  YOB: 1955        AGE: 69 y.o.        SEX: female  Body mass index is 30.52 kg/m².    PCP: Anne-Marie Cortez MD  10/18/24      This office note has been dictated.      REVIEW OF SYSTEMS:  Constitutional: Negative for fever, chills, weight loss and malaise/fatigue.   HENT: Negative.    Eyes: Negative.    Respiratory: Negative.   Cardiovascular: Negative.   Gastrointestinal: No bowel incontinence or constipation.  Genitourinary: No bladder incontinence or saddle anesthesia.  Skin: Negative.   Neurological: Negative.    Endo/Heme/Allergies: Negative.    Psychiatric/Behavioral: Negative.  Musculoskeletal: As per HPI above.     Past Medical History:   Diagnosis Date    Ankle fracture 09/2020    no cast or sx    Arthritis     Rheumatoid arthritis    Ganglion 10/2019    Glaucoma     Hypertension     Osteoporosis     Rheumatoid arthritis (HCC)     Dr. asencio.  Cimzia infusions monthly         Current Outpatient Medications:     lisinopril (PRINIVIL;ZESTRIL) 20 MG tablet, Take 1 tablet by mouth daily, Disp: , Rfl:     acetaminophen (TYLENOL) 500 MG tablet, Take 2 tablets by mouth every 8 hours as needed for Pain, Disp: 90 tablet, Rfl: 1    amLODIPine (NORVASC) 5 MG tablet, Take 1 tablet by mouth daily, Disp: 90 tablet, Rfl: 3    Cholecalciferol (VITAMIN D3) 50 MCG (2000 UT) CAPS, Take by mouth daily, Disp: , Rfl:     Multiple Vitamins-Minerals (THERAPEUTIC MULTIVITAMIN-MINERALS) tablet, Take 1 tablet by mouth daily, Disp: , Rfl:     Abatacept (ORENCIA CLICKJECT) 125 MG/ML SOAJ, weekly, Disp: , Rfl:     dorzolamide-timolol (COSOPT) 22.3-6.8 MG/ML ophthalmic solution, 1 drop into affected eye, Disp: , Rfl:     latanoprost (XALATAN) 0.005 % ophthalmic solution, Apply 1 drop to eye, Disp: , Rfl:     amoxicillin (AMOXIL) 500 MG capsule, Take 1 capsule by mouth 3 times daily (Patient not taking: Reported on 10/18/2024), Disp: ,

## 2024-10-23 ENCOUNTER — TELEPHONE (OUTPATIENT)
Facility: HOSPITAL | Age: 69
End: 2024-10-23

## 2024-10-23 ENCOUNTER — APPOINTMENT (OUTPATIENT)
Facility: HOSPITAL | Age: 69
End: 2024-10-23
Payer: MEDICARE

## 2024-10-24 ENCOUNTER — TELEPHONE (OUTPATIENT)
Facility: HOSPITAL | Age: 69
End: 2024-10-24

## 2024-10-24 NOTE — TELEPHONE ENCOUNTER
Patient cancelled appt. on 10/24/24 at 10:26am due to the patient not having any transportation to get to her appt. & she is not able to make her appt. tomorrow.

## 2024-10-25 ENCOUNTER — APPOINTMENT (OUTPATIENT)
Facility: HOSPITAL | Age: 69
End: 2024-10-25
Payer: MEDICARE

## 2024-10-29 ENCOUNTER — APPOINTMENT (OUTPATIENT)
Facility: HOSPITAL | Age: 69
End: 2024-10-29
Payer: MEDICARE

## 2024-11-08 ENCOUNTER — HOSPITAL ENCOUNTER (OUTPATIENT)
Facility: HOSPITAL | Age: 69
Discharge: HOME | End: 2024-11-11
Attending: INTERNAL MEDICINE
Payer: MEDICARE

## 2024-11-08 VITALS — WEIGHT: 174 LBS | HEIGHT: 64 IN | BODY MASS INDEX: 29.71 KG/M2

## 2024-11-08 DIAGNOSIS — Z12.31 VISIT FOR SCREENING MAMMOGRAM: ICD-10-CM

## 2024-11-08 PROCEDURE — 77063 BREAST TOMOSYNTHESIS BI: CPT

## 2024-11-15 NOTE — TELEPHONE ENCOUNTER
PCP: Anne-Marie Cortez MD    Refill Request     LAST OFFICE VISIT:      Medication: lisinopril (PRINIVIL;ZESTRIL) 20 MG tablet     Dispense:       Pharmacy [facility]ARMACY #[id number] - [place] [id number] [facility] [phone] - F [phone] [[id number]]       Future Appointments   Date Time Provider Department Center   1/24/2025 10:50 AM Kali Espinal PA-C [facility] BS [facility]   3/20/2025 10:40 AM Anne-Marie Cortez MD [facility] DEP       Needs a new Rx sent to the pharmacy

## 2024-11-21 RX ORDER — LISINOPRIL 20 MG/1
20 TABLET ORAL DAILY
Qty: 30 TABLET | Refills: 3 | Status: SHIPPED | OUTPATIENT
Start: 2024-11-21

## 2024-12-09 RX ORDER — LISINOPRIL 20 MG/1
20 TABLET ORAL DAILY
Qty: 90 TABLET | Refills: 0 | Status: SHIPPED | OUTPATIENT
Start: 2024-12-09

## 2024-12-10 ENCOUNTER — HOSPITAL ENCOUNTER (OUTPATIENT)
Facility: HOSPITAL | Age: 69
Discharge: HOME | End: 2024-12-13
Payer: MEDICARE

## 2024-12-10 DIAGNOSIS — M89.9 BONE LESION: ICD-10-CM

## 2024-12-10 LAB — CREAT UR-MCNC: 0.7 MG/DL (ref 0.6–1.3)

## 2024-12-10 PROCEDURE — 6360000004 HC RX CONTRAST MEDICATION: Performed by: INTERNAL MEDICINE

## 2024-12-10 PROCEDURE — 72197 MRI PELVIS W/O & W/DYE: CPT

## 2024-12-10 PROCEDURE — 82565 ASSAY OF CREATININE: CPT

## 2024-12-10 PROCEDURE — A9577 INJ MULTIHANCE: HCPCS | Performed by: INTERNAL MEDICINE

## 2024-12-10 RX ADMIN — GADOBENATE DIMEGLUMINE 17 ML: 529 INJECTION, SOLUTION INTRAVENOUS at 15:32

## 2024-12-20 ENCOUNTER — TELEPHONE (OUTPATIENT)
Facility: CLINIC | Age: 69
End: 2024-12-20

## 2024-12-20 NOTE — TELEPHONE ENCOUNTER
Patient was notified of Dr. Cortez's M[facility]. Patient aware that future appointments with Dr. Cortez have been cancelled and [facility] will provide patient the needed refills on medication for 90 days.   Patient was offered the phone numbers of offices in [facility] taking new patients, [facility] [phone] and [facility] [phone].  All questions were answered and patient voiced understanding.

## 2025-01-17 ENCOUNTER — OFFICE VISIT (OUTPATIENT)
Age: 70
End: 2025-01-17
Payer: MEDICARE

## 2025-01-17 VITALS — WEIGHT: 179 LBS | BODY MASS INDEX: 30.56 KG/M2 | HEIGHT: 64 IN

## 2025-01-17 DIAGNOSIS — Z96.642 HISTORY OF TOTAL LEFT HIP REPLACEMENT: Primary | ICD-10-CM

## 2025-01-17 PROCEDURE — G8417 CALC BMI ABV UP PARAM F/U: HCPCS | Performed by: PHYSICIAN ASSISTANT

## 2025-01-17 PROCEDURE — 99214 OFFICE O/P EST MOD 30 MIN: CPT | Performed by: PHYSICIAN ASSISTANT

## 2025-01-17 PROCEDURE — 1036F TOBACCO NON-USER: CPT | Performed by: PHYSICIAN ASSISTANT

## 2025-01-17 PROCEDURE — G8427 DOCREV CUR MEDS BY ELIG CLIN: HCPCS | Performed by: PHYSICIAN ASSISTANT

## 2025-01-17 PROCEDURE — G8399 PT W/DXA RESULTS DOCUMENT: HCPCS | Performed by: PHYSICIAN ASSISTANT

## 2025-01-17 PROCEDURE — 1160F RVW MEDS BY RX/DR IN RCRD: CPT | Performed by: PHYSICIAN ASSISTANT

## 2025-01-17 PROCEDURE — 3017F COLORECTAL CA SCREEN DOC REV: CPT | Performed by: PHYSICIAN ASSISTANT

## 2025-01-17 PROCEDURE — 1159F MED LIST DOCD IN RCRD: CPT | Performed by: PHYSICIAN ASSISTANT

## 2025-01-17 PROCEDURE — 1090F PRES/ABSN URINE INCON ASSESS: CPT | Performed by: PHYSICIAN ASSISTANT

## 2025-01-17 PROCEDURE — 1126F AMNT PAIN NOTED NONE PRSNT: CPT | Performed by: PHYSICIAN ASSISTANT

## 2025-01-17 PROCEDURE — 1123F ACP DISCUSS/DSCN MKR DOCD: CPT | Performed by: PHYSICIAN ASSISTANT

## 2025-01-17 NOTE — PROGRESS NOTES
Patient: Yesy Peng                MRN: [id number]       SSN: [id number]  YOB: 1955        AGE: 69 y.o.        SEX: female  Body mass index is 30.73 kg/m².    PCP: Unknown, Provider, ALONZO  01/17/25      This office note has been dictated.      REVIEW OF SYSTEMS:  Constitutional: Negative for fever, chills, weight loss and malaise/fatigue.   HENT: Negative.    Eyes: Negative.    Respiratory: Negative.   Cardiovascular: Negative.   Gastrointestinal: No bowel incontinence or constipation.  Genitourinary: No bladder incontinence or saddle anesthesia.  Skin: Negative.   Neurological: Negative.    Endo/Heme/Allergies: Negative.    Psychiatric/Behavioral: Negative.  Musculoskeletal: As per HPI above.     Past Medical History:   Diagnosis Date    Ankle fracture 09/2020    no cast or sx    Arthritis     Rheumatoid arthritis    Ganglion 10/2019    Glaucoma     Hypertension     Osteoporosis     Rheumatoid arthritis (HCC)     Dr. asencio.  Cimzia infusions monthly         Current Outpatient Medications:     lisinopril (PRINIVIL;ZESTRIL) 20 MG tablet, TAKE 1 TABLET BY MOUTH EVERY DAY, Disp: 90 tablet, Rfl: 0    amoxicillin (AMOXIL) 500 MG capsule, Take 1 capsule by mouth 3 times daily (Patient not taking: Reported on 10/18/2024), Disp: , Rfl:     acetaminophen (TYLENOL) 500 MG tablet, Take 2 tablets by mouth every 8 hours as needed for Pain, Disp: 90 tablet, Rfl: 1    amLODIPine (NORVASC) 5 MG tablet, Take 1 tablet by mouth daily, Disp: 90 tablet, Rfl: 3    Cholecalciferol (VITAMIN D3) 50 MCG (2000 UT) CAPS, Take by mouth daily, Disp: , Rfl:     Multiple Vitamins-Minerals (THERAPEUTIC MULTIVITAMIN-MINERALS) tablet, Take 1 tablet by mouth daily, Disp: , Rfl:     Abatacept (ORENCIA CLICKJECT) 125 MG/ML SOAJ, weekly, Disp: , Rfl:     dorzolamide-timolol (COSOPT) 22.3-6.8 MG/ML ophthalmic solution, 1 drop into affected eye, Disp: , Rfl:     latanoprost (XALATAN) 0.005 % ophthalmic solution, Apply 1 drop

## 2025-03-27 ENCOUNTER — OFFICE VISIT (OUTPATIENT)
Facility: CLINIC | Age: 70
End: 2025-03-27
Payer: MEDICARE

## 2025-03-27 VITALS
BODY MASS INDEX: 30.39 KG/M2 | RESPIRATION RATE: 16 BRPM | DIASTOLIC BLOOD PRESSURE: 70 MMHG | OXYGEN SATURATION: 98 % | HEIGHT: 64 IN | HEART RATE: 83 BPM | TEMPERATURE: 97.4 F | WEIGHT: 178 LBS | SYSTOLIC BLOOD PRESSURE: 134 MMHG

## 2025-03-27 DIAGNOSIS — M81.0 AGE-RELATED OSTEOPOROSIS WITHOUT CURRENT PATHOLOGICAL FRACTURE: ICD-10-CM

## 2025-03-27 DIAGNOSIS — M05.79 RHEUMATOID ARTHRITIS OF MULTIPLE SITES WITHOUT ORGAN OR SYSTEM INVOLVEMENT WITH POSITIVE RHEUMATOID FACTOR (HCC): ICD-10-CM

## 2025-03-27 DIAGNOSIS — E78.00 PURE HYPERCHOLESTEROLEMIA: ICD-10-CM

## 2025-03-27 DIAGNOSIS — E55.9 VITAMIN D DEFICIENCY: ICD-10-CM

## 2025-03-27 DIAGNOSIS — I10 ESSENTIAL (PRIMARY) HYPERTENSION: Primary | ICD-10-CM

## 2025-03-27 PROCEDURE — 1126F AMNT PAIN NOTED NONE PRSNT: CPT | Performed by: FAMILY MEDICINE

## 2025-03-27 PROCEDURE — G8417 CALC BMI ABV UP PARAM F/U: HCPCS | Performed by: FAMILY MEDICINE

## 2025-03-27 PROCEDURE — G8399 PT W/DXA RESULTS DOCUMENT: HCPCS | Performed by: FAMILY MEDICINE

## 2025-03-27 PROCEDURE — 99204 OFFICE O/P NEW MOD 45 MIN: CPT | Performed by: FAMILY MEDICINE

## 2025-03-27 PROCEDURE — 1036F TOBACCO NON-USER: CPT | Performed by: FAMILY MEDICINE

## 2025-03-27 PROCEDURE — G8427 DOCREV CUR MEDS BY ELIG CLIN: HCPCS | Performed by: FAMILY MEDICINE

## 2025-03-27 PROCEDURE — 1123F ACP DISCUSS/DSCN MKR DOCD: CPT | Performed by: FAMILY MEDICINE

## 2025-03-27 PROCEDURE — 1090F PRES/ABSN URINE INCON ASSESS: CPT | Performed by: FAMILY MEDICINE

## 2025-03-27 PROCEDURE — 1159F MED LIST DOCD IN RCRD: CPT | Performed by: FAMILY MEDICINE

## 2025-03-27 PROCEDURE — 3075F SYST BP GE 130 - 139MM HG: CPT | Performed by: FAMILY MEDICINE

## 2025-03-27 PROCEDURE — 1160F RVW MEDS BY RX/DR IN RCRD: CPT | Performed by: FAMILY MEDICINE

## 2025-03-27 PROCEDURE — 3017F COLORECTAL CA SCREEN DOC REV: CPT | Performed by: FAMILY MEDICINE

## 2025-03-27 PROCEDURE — 3078F DIAST BP <80 MM HG: CPT | Performed by: FAMILY MEDICINE

## 2025-03-27 RX ORDER — LISINOPRIL 20 MG/1
20 TABLET ORAL DAILY
Qty: 90 TABLET | Refills: 1 | Status: SHIPPED | OUTPATIENT
Start: 2025-03-27

## 2025-03-27 RX ORDER — AMOXICILLIN 500 MG/1
CAPSULE ORAL
Qty: 4 CAPSULE | Refills: 3 | Status: SHIPPED | OUTPATIENT
Start: 2025-03-27

## 2025-03-27 RX ORDER — AMLODIPINE BESYLATE 5 MG/1
5 TABLET ORAL DAILY
Qty: 90 TABLET | Refills: 1 | Status: SHIPPED | OUTPATIENT
Start: 2025-03-27

## 2025-03-27 SDOH — ECONOMIC STABILITY: FOOD INSECURITY: WITHIN THE PAST 12 MONTHS, THE FOOD YOU BOUGHT JUST DIDN'T LAST AND YOU DIDN'T HAVE MONEY TO GET MORE.: NEVER TRUE

## 2025-03-27 SDOH — ECONOMIC STABILITY: FOOD INSECURITY: WITHIN THE PAST 12 MONTHS, YOU WORRIED THAT YOUR FOOD WOULD RUN OUT BEFORE YOU GOT MONEY TO BUY MORE.: NEVER TRUE

## 2025-03-27 ASSESSMENT — PATIENT HEALTH QUESTIONNAIRE - PHQ9
SUM OF ALL RESPONSES TO PHQ QUESTIONS 1-9: 0
1. LITTLE INTEREST OR PLEASURE IN DOING THINGS: NOT AT ALL
SUM OF ALL RESPONSES TO PHQ QUESTIONS 1-9: 0
2. FEELING DOWN, DEPRESSED OR HOPELESS: NOT AT ALL

## 2025-03-27 NOTE — PATIENT INSTRUCTIONS
Component  Ref Range & Units (hover) 4/9/24 0950 10/4/22 1340 4/9/21 1117   LIPID PANEL           Cholesterol, Total 245 High  207 High  196   Triglycerides 116 153 High   High  CM   Comment: The drugs N-acetylcysteine (NAC) and  Metamiszole have been found to cause falsely  low results in this chemical assay. Please  be sure to submit blood samples obtained  BEFORE administration of either of these  drugs to assure correct results.   HDL 46 39 Low  45   LDL Calculated 175.8 High  137.4 High  99.2   VLDL Cholesterol Calculated 23.2 30.6 51.8   Chol/HDL Ratio 5.3 High  5.3 High  4.4   Resulting Agency [facility]               Schedule your appointment with your gastroenterology to complete your colonoscopy as you are due now.

## 2025-03-27 NOTE — PROGRESS NOTES
\"Have you been to the ER, urgent care clinic since your last visit?  Hospitalized since your last visit?\"    NO    “Have you seen or consulted any other health care providers outside our system since your last visit?”    Rheumatology, Dr. Leal LOV: 2/28/24 for follow-up and blood work.      “Have you had a colorectal cancer screening such as a colonoscopy/FIT/Cologuard?    NO    Date of last Colonoscopy: 2/22/2022 - sees [facility]  No cologuard on file  No FIT/FOBT on file   No flexible sigmoidoscopy on file     Chief Complaint   Patient presents with    New Patient            
cholesterol test will be ordered. Advised to schedule an appointment with her gastroenterologist for a colonoscopy.    Pt understood and agree with the plan     Follow-up  The patient will follow up in 4 months.    Results  Labs   - Kidney function test: 09/2024, Normal   - A1c: 09/2024, Within normal limit   - CBC: Hematocrit was high   - Cholesterol: 04/2024, High  1. Essential (primary) hypertension  Comments:  Controlled, continue medications, refilled.    Orders:  -     amLODIPine (NORVASC) 5 MG tablet; Take 1 tablet by mouth daily, Disp-90 tablet, R-1Normal  2. Rheumatoid arthritis of multiple sites without organ or system involvement with positive rheumatoid factor (HCC)  3. BMI 30.0-30.9,adult  4. Age-related osteoporosis without current pathological fracture  5. Pure hypercholesterolemia  -     Lipid Panel; Future  -     Comprehensive Metabolic Panel; Future  6. Vitamin D deficiency    Return in about 4 months (around 7/27/2025) for wellness + follow up .       Subjective   History of Present Illness  The patient presents for evaluation of hypertension, rheumatoid arthritis, osteoporosis, and health maintenance.    A documented history of hypertension is noted. Currently, no symptoms such as headache, dizziness, nausea, vomiting, chest pain, respiratory distress, palpitations, abdominal pain, constipation, diarrhea, urinary issues, depression, or anxiety are reported. Sleep patterns are normal, and no significant changes in weight or appetite are noted.    A known diagnosis of rheumatoid arthritis is managed by Dr. Charles. No recent exacerbation of joint pain, swelling, or unusual fatigue beyond the chronic baseline is reported. Symptoms are influenced by weather changes. Blood work was completed on 02/28/2025, as ordered by the rheumatologist. Orencia is currently prescribed for this condition.    A history of osteoporosis is noted, with previous daily injections prior to surgery. Vitamin D supplements

## 2025-07-18 ENCOUNTER — OFFICE VISIT (OUTPATIENT)
Age: 70
End: 2025-07-18

## 2025-07-18 VITALS — WEIGHT: 178 LBS | BODY MASS INDEX: 30.55 KG/M2

## 2025-07-18 DIAGNOSIS — Z96.642 HISTORY OF TOTAL LEFT HIP REPLACEMENT: Primary | ICD-10-CM

## 2025-07-18 NOTE — PROGRESS NOTES
Patient: Yesy Peng                MRN: [id number]       SSN: [id number]  YOB: 1955        AGE: 70 y.o.        SEX: female  Body mass index is 30.55 kg/m².    PCP: Alyssa Gandhi MD  07/18/25      This office note has been dictated.      REVIEW OF SYSTEMS:  Constitutional: Negative for fever, chills, weight loss and malaise/fatigue.   HENT: Negative.    Eyes: Negative.    Respiratory: Negative.   Cardiovascular: Negative.   Gastrointestinal: No bowel incontinence or constipation.  Genitourinary: No bladder incontinence or saddle anesthesia.  Skin: Negative.   Neurological: Negative.    Endo/Heme/Allergies: Negative.    Psychiatric/Behavioral: Negative.  Musculoskeletal: As per HPI above.     Past Medical History:   Diagnosis Date    Ankle fracture 09/2020    no cast or sx    Arthritis     Rheumatoid arthritis    Ganglion 10/2019    Glaucoma     Hypertension     Osteoporosis     Rheumatoid arthritis (HCC)     Dr. asencio.  Cimzia infusions monthly         Current Outpatient Medications:     amoxicillin (AMOXIL) 500 MG capsule, TAKE 4 CAPSULES BY MOUTH ONE HOUR PRIOR TO APPOINTMENT (Patient not taking: Reported on 3/27/2025), Disp: 4 capsule, Rfl: 3    lisinopril (PRINIVIL;ZESTRIL) 20 MG tablet, Take 1 tablet by mouth daily, Disp: 90 tablet, Rfl: 1    amLODIPine (NORVASC) 5 MG tablet, Take 1 tablet by mouth daily, Disp: 90 tablet, Rfl: 1    acetaminophen (TYLENOL) 500 MG tablet, Take 2 tablets by mouth every 8 hours as needed for Pain, Disp: 90 tablet, Rfl: 1    Cholecalciferol (VITAMIN D3) 50 MCG (2000 UT) CAPS, Take 1 capsule by mouth daily, Disp: , Rfl:     Multiple Vitamins-Minerals (THERAPEUTIC MULTIVITAMIN-MINERALS) tablet, Take 1 tablet by mouth daily, Disp: , Rfl:     Abatacept (ORENCIA CLICKJECT) 125 MG/ML SOAJ, weekly, Disp: , Rfl:     dorzolamide-timolol (COSOPT) 22.3-6.8 MG/ML ophthalmic solution, 1 drop into affected eye, Disp: , Rfl:     latanoprost (XALATAN) 0.005 %

## 2025-08-06 ENCOUNTER — TELEPHONE (OUTPATIENT)
Age: 70
End: 2025-08-06

## 2025-08-06 DIAGNOSIS — Z96.641 PRESENCE OF RIGHT ARTIFICIAL HIP JOINT: ICD-10-CM

## 2025-08-06 DIAGNOSIS — Z96.642 HISTORY OF TOTAL LEFT HIP REPLACEMENT: Primary | ICD-10-CM

## 2025-08-07 RX ORDER — AMOXICILLIN 500 MG/1
CAPSULE ORAL
Qty: 4 CAPSULE | Status: SHIPPED | OUTPATIENT
Start: 2025-08-07

## 2025-08-13 ENCOUNTER — HOSPITAL ENCOUNTER (OUTPATIENT)
Age: 70
Discharge: HOME | End: 2025-08-13
Payer: MEDICARE

## 2025-08-13 DIAGNOSIS — E78.00 PURE HYPERCHOLESTEROLEMIA: ICD-10-CM

## 2025-08-13 LAB
ALBUMIN SERPL-MCNC: 3.5 G/DL (ref 3.4–5)
ALBUMIN/GLOB SERPL: 1 (ref 0.8–1.7)
ALP SERPL-CCNC: 80 U/L (ref 45–117)
ALT SERPL-CCNC: 29 U/L (ref 10–35)
ANION GAP SERPL CALC-SCNC: 11 MMOL/L (ref 3–18)
AST SERPL-CCNC: 23 U/L (ref 10–38)
BILIRUB SERPL-MCNC: 0.5 MG/DL (ref 0.2–1)
BUN SERPL-MCNC: 11 MG/DL (ref 6–23)
BUN/CREAT SERPL: 20 (ref 12–20)
CALCIUM SERPL-MCNC: 10.4 MG/DL (ref 8.5–10.1)
CHLORIDE SERPL-SCNC: 103 MMOL/L (ref 98–107)
CHOLEST SERPL-MCNC: 234 MG/DL
CO2 SERPL-SCNC: 26 MMOL/L (ref 21–32)
CREAT SERPL-MCNC: 0.55 MG/DL (ref 0.6–1.3)
GLOBULIN SER CALC-MCNC: 3.4 G/DL (ref 2–4)
GLUCOSE SERPL-MCNC: 97 MG/DL (ref 74–108)
HDLC SERPL-MCNC: 43 MG/DL (ref 40–60)
HDLC SERPL: 5.4 (ref 0–5)
LDLC SERPL CALC-MCNC: 164 MG/DL (ref 0–100)
POTASSIUM SERPL-SCNC: 5 MMOL/L (ref 3.5–5.5)
PROT SERPL-MCNC: 7 G/DL (ref 6.4–8.2)
SODIUM SERPL-SCNC: 139 MMOL/L (ref 136–145)
TRIGL SERPL-MCNC: 135 MG/DL (ref 0–150)
VLDLC SERPL CALC-MCNC: 27 MG/DL

## 2025-08-13 PROCEDURE — 36415 COLL VENOUS BLD VENIPUNCTURE: CPT

## 2025-08-13 PROCEDURE — 80061 LIPID PANEL: CPT

## 2025-08-13 PROCEDURE — 80053 COMPREHEN METABOLIC PANEL: CPT

## 2025-08-18 ENCOUNTER — OFFICE VISIT (OUTPATIENT)
Facility: CLINIC | Age: 70
End: 2025-08-18

## 2025-08-18 VITALS
HEART RATE: 80 BPM | TEMPERATURE: 97.3 F | RESPIRATION RATE: 16 BRPM | DIASTOLIC BLOOD PRESSURE: 66 MMHG | SYSTOLIC BLOOD PRESSURE: 110 MMHG | HEIGHT: 64 IN | WEIGHT: 178 LBS | OXYGEN SATURATION: 96 % | BODY MASS INDEX: 30.39 KG/M2

## 2025-08-18 DIAGNOSIS — E83.52 SERUM CALCIUM ELEVATED: ICD-10-CM

## 2025-08-18 DIAGNOSIS — J04.0 LARYNGITIS: ICD-10-CM

## 2025-08-18 DIAGNOSIS — E78.00 PURE HYPERCHOLESTEROLEMIA: ICD-10-CM

## 2025-08-18 DIAGNOSIS — M81.0 AGE-RELATED OSTEOPOROSIS WITHOUT CURRENT PATHOLOGICAL FRACTURE: ICD-10-CM

## 2025-08-18 DIAGNOSIS — I10 ESSENTIAL (PRIMARY) HYPERTENSION: ICD-10-CM

## 2025-08-18 DIAGNOSIS — Z00.00 MEDICARE ANNUAL WELLNESS VISIT, SUBSEQUENT: Primary | ICD-10-CM

## 2025-08-18 DIAGNOSIS — R05.1 ACUTE COUGH: ICD-10-CM

## 2025-08-18 DIAGNOSIS — M05.79 RHEUMATOID ARTHRITIS OF MULTIPLE SITES WITHOUT ORGAN OR SYSTEM INVOLVEMENT WITH POSITIVE RHEUMATOID FACTOR (HCC): ICD-10-CM

## 2025-08-18 DIAGNOSIS — R09.81 NASAL CONGESTION: ICD-10-CM

## 2025-08-18 RX ORDER — PREDNISONE 5 MG/1
TABLET ORAL
COMMUNITY
Start: 2025-07-22

## 2025-08-18 RX ORDER — LISINOPRIL 20 MG/1
20 TABLET ORAL DAILY
Qty: 90 TABLET | Refills: 1 | Status: SHIPPED | OUTPATIENT
Start: 2025-08-18

## 2025-08-18 RX ORDER — ALBUTEROL SULFATE 90 UG/1
2 INHALANT RESPIRATORY (INHALATION) 4 TIMES DAILY PRN
Qty: 18 G | Refills: 0 | Status: SHIPPED | OUTPATIENT
Start: 2025-08-18

## 2025-08-18 RX ORDER — AMLODIPINE BESYLATE 5 MG/1
5 TABLET ORAL DAILY
Qty: 90 TABLET | Refills: 1 | Status: SHIPPED | OUTPATIENT
Start: 2025-08-18

## 2025-08-18 ASSESSMENT — PATIENT HEALTH QUESTIONNAIRE - PHQ9
SUM OF ALL RESPONSES TO PHQ QUESTIONS 1-9: 0
SUM OF ALL RESPONSES TO PHQ QUESTIONS 1-9: 0
1. LITTLE INTEREST OR PLEASURE IN DOING THINGS: NOT AT ALL
2. FEELING DOWN, DEPRESSED OR HOPELESS: NOT AT ALL
SUM OF ALL RESPONSES TO PHQ QUESTIONS 1-9: 0
SUM OF ALL RESPONSES TO PHQ QUESTIONS 1-9: 0

## 2025-08-18 ASSESSMENT — LIFESTYLE VARIABLES
HOW MANY STANDARD DRINKS CONTAINING ALCOHOL DO YOU HAVE ON A TYPICAL DAY: PATIENT DOES NOT DRINK
HOW OFTEN DO YOU HAVE A DRINK CONTAINING ALCOHOL: NEVER

## (undated) DEVICE — BIPOLAR SEALER 23-112-1 AQM 6.0: Brand: AQUAMANTYS ®

## (undated) DEVICE — WEREWOLF FASTSEAL 6.0 HEMOSTASIS WAND: Brand: FASTSEAL 6.0 HEMOSTASIS WAND

## (undated) DEVICE — SYRINGE MED 3ML NDL 22GA L1 1/2IN REG BVL SFGLDE

## (undated) DEVICE — INTENDED FOR TISSUE SEPARATION, AND OTHER PROCEDURES THAT REQUIRE A SHARP SURGICAL BLADE TO PUNCTURE OR CUT.: Brand: BARD-PARKER SAFETY BLADES SIZE 15, STERILE

## (undated) DEVICE — SYRINGE MED 30ML STD CLR PLAS LUERLOCK TIP N CTRL DISP

## (undated) DEVICE — X-RAY SPONGES,12 PLY: Brand: DERMACEA

## (undated) DEVICE — PILLOW POS W15XH6XL22IN RASPBERRY FOAM ABD W/ STRP DISP FOR

## (undated) DEVICE — ELECTRODE PT RET AD L9FT HI MOIST COND ADH HYDRGEL CORDED

## (undated) DEVICE — Device

## (undated) DEVICE — GOWN,REINFORCED,POLY,AURORA,XXLARGE,STR: Brand: MEDLINE

## (undated) DEVICE — GOWN ,SIRUS ,NONREINFORCED 4XL: Brand: MEDLINE

## (undated) DEVICE — 3M™ STERI-DRAPE™ U-DRAPE 1015: Brand: STERI-DRAPE™

## (undated) DEVICE — SUTURE VCRL SZ 0 L27IN ABSRB UD L36MM CT-1 1/2 CIR J260H

## (undated) DEVICE — KIT CLN UP BON SECOURS MARYV

## (undated) DEVICE — Device: Brand: JELCO

## (undated) DEVICE — BLANKET WRM W29.9XL79.1IN UP BODY FORC AIR MISTRAL-AIR

## (undated) DEVICE — SUTURE VCRL SZ 2 L27IN ABSRB VLT L65MM TP-1 1/2 CIR J649G

## (undated) DEVICE — STRYKER PERFORMANCE SERIES SAGITTAL BLADE: Brand: STRYKER PERFORMANCE SERIES

## (undated) DEVICE — 4-PORT MANIFOLD: Brand: NEPTUNE 2

## (undated) DEVICE — 3M™ STERI-DRAPE™ INSTRUMENT POUCH 1018: Brand: STERI-DRAPE™

## (undated) DEVICE — SUTURE VICRYL SZ 2 L27IN ABSRB VLT L65MM TP-1 1/2 CIR J649G

## (undated) DEVICE — SOLUTION IV 1000ML 0.9% SOD CHL

## (undated) DEVICE — SKIN MARKER,REGULAR TIP WITH RULER AND LABELS: Brand: DEVON

## (undated) DEVICE — TAPE,CLOTH/SILK,CURAD,3"X10YD,LF,40/CS: Brand: CURAD

## (undated) DEVICE — APPLICATOR MEDICATED 26 CC SOLUTION HI LT ORNG CHLORAPREP

## (undated) DEVICE — DRESSING HYDROFIBER AQUACEL AG ADVANTAGE 3.5X14 IN

## (undated) DEVICE — SUTURE VICRYL SZ 1 L27IN ABSRB VLT CTX L48MM 1 2 CIR SGL ARMED J365H

## (undated) DEVICE — PREMIUM DRY TRAY LF: Brand: MEDLINE INDUSTRIES, INC.

## (undated) DEVICE — SOLUTION IRRIG 3000ML LAC R FLX CONT

## (undated) DEVICE — INTENDED FOR TISSUE SEPARATION, AND OTHER PROCEDURES THAT REQUIRE A SHARP SURGICAL BLADE TO PUNCTURE OR CUT.: Brand: BARD-PARKER ® CARBON RIB-BACK BLADES

## (undated) DEVICE — DISPOSABLE MULTI BAG ADAPTERS Y                                    TUBING, STERILE, 2 TO A SET 6 SETS                                    PER BOX

## (undated) DEVICE — KIT OR TURNOVER

## (undated) DEVICE — DECANTER BAG 9": Brand: MEDLINE INDUSTRIES, INC.

## (undated) DEVICE — SUTURE VCRL SZ 1 L27IN ABSRB VLT CTX L48MM 1 2 CIR SGL ARMED J365H

## (undated) DEVICE — PACK PROCEDURE SURG TOT HIP BSHR LF

## (undated) DEVICE — HANDPIECE SET WITH HIGH FLOW TIP AND SUCTION TUBE: Brand: INTERPULSE

## (undated) DEVICE — JP 3-SPRING RES W/15FR PVC DRAIN/TR: Brand: CARDINAL HEALTH

## (undated) DEVICE — HOOD WITH PEEL AWAY FACE SHIELD: Brand: T7PLUS

## (undated) DEVICE — SOLUTION IRRIG 1000ML 0.9% SOD CHL USP POUR PLAS BTL

## (undated) DEVICE — BIT DRILL SINGLE USE

## (undated) DEVICE — SUTURE MCRYL SZ 2-0 L36IN ABSRB UD L36MM CT-1 1/2 CIR Y945H

## (undated) DEVICE — SUTURE VICRYL SZ 0 L27IN ABSRB UD L36MM CT-1 1/2 CIR J260H

## (undated) DEVICE — SUTURE MONOCRYL SZ 2-0 L36IN ABSRB UD L36MM CT-1 1/2 CIR Y945H